# Patient Record
Sex: FEMALE | Race: WHITE | NOT HISPANIC OR LATINO | Employment: OTHER | URBAN - METROPOLITAN AREA
[De-identification: names, ages, dates, MRNs, and addresses within clinical notes are randomized per-mention and may not be internally consistent; named-entity substitution may affect disease eponyms.]

---

## 2018-10-09 ENCOUNTER — APPOINTMENT (EMERGENCY)
Dept: RADIOLOGY | Facility: HOSPITAL | Age: 83
End: 2018-10-09
Payer: MEDICARE

## 2018-10-09 ENCOUNTER — HOSPITAL ENCOUNTER (EMERGENCY)
Facility: HOSPITAL | Age: 83
Discharge: HOME | End: 2018-10-09
Attending: EMERGENCY MEDICINE | Admitting: EMERGENCY MEDICINE
Payer: MEDICARE

## 2018-10-09 VITALS
BODY MASS INDEX: 30.73 KG/M2 | SYSTOLIC BLOOD PRESSURE: 150 MMHG | HEIGHT: 64 IN | TEMPERATURE: 98.2 F | OXYGEN SATURATION: 97 % | RESPIRATION RATE: 15 BRPM | HEART RATE: 80 BPM | WEIGHT: 180 LBS | DIASTOLIC BLOOD PRESSURE: 70 MMHG

## 2018-10-09 DIAGNOSIS — R42 DIZZY: Primary | ICD-10-CM

## 2018-10-09 LAB
ALBUMIN SERPL BCP-MCNC: 3.4 G/DL (ref 3.5–5)
ALP SERPL-CCNC: 83 U/L (ref 46–116)
ALT SERPL W P-5'-P-CCNC: 39 U/L (ref 12–78)
ANION GAP SERPL CALCULATED.3IONS-SCNC: 13 MMOL/L (ref 4–13)
APTT PPP: 23 SECONDS (ref 24–33)
AST SERPL W P-5'-P-CCNC: 30 U/L (ref 5–45)
BASOPHILS # BLD AUTO: 0.04 THOUSANDS/ΜL (ref 0–0.1)
BASOPHILS NFR BLD AUTO: 1 % (ref 0–1)
BILIRUB SERPL-MCNC: 0.3 MG/DL (ref 0.2–1)
BUN SERPL-MCNC: 11 MG/DL (ref 5–25)
CALCIUM SERPL-MCNC: 9.3 MG/DL (ref 8.3–10.1)
CHLORIDE SERPL-SCNC: 99 MMOL/L (ref 100–108)
CO2 SERPL-SCNC: 21 MMOL/L (ref 21–32)
CREAT SERPL-MCNC: 0.95 MG/DL (ref 0.6–1.3)
EOSINOPHIL # BLD AUTO: 0.22 THOUSAND/ΜL (ref 0–0.61)
EOSINOPHIL NFR BLD AUTO: 3 % (ref 0–6)
ERYTHROCYTE [DISTWIDTH] IN BLOOD BY AUTOMATED COUNT: 13.3 % (ref 11.6–15.1)
GFR SERPL CREATININE-BSD FRML MDRD: 54 ML/MIN/1.73SQ M
GLUCOSE SERPL-MCNC: 162 MG/DL (ref 65–140)
GLUCOSE SERPL-MCNC: 254 MG/DL (ref 65–140)
HCT VFR BLD AUTO: 37.8 % (ref 34.8–46.1)
HGB BLD-MCNC: 12.2 G/DL (ref 11.5–15.4)
IMM GRANULOCYTES # BLD AUTO: 0.03 THOUSAND/UL (ref 0–0.2)
IMM GRANULOCYTES NFR BLD AUTO: 0 % (ref 0–2)
INR PPP: 1.01 (ref 0.86–1.16)
LYMPHOCYTES # BLD AUTO: 1.8 THOUSANDS/ΜL (ref 0.6–4.47)
LYMPHOCYTES NFR BLD AUTO: 24 % (ref 14–44)
MCH RBC QN AUTO: 30 PG (ref 26.8–34.3)
MCHC RBC AUTO-ENTMCNC: 32.3 G/DL (ref 31.4–37.4)
MCV RBC AUTO: 93 FL (ref 82–98)
MONOCYTES # BLD AUTO: 0.52 THOUSAND/ΜL (ref 0.17–1.22)
MONOCYTES NFR BLD AUTO: 7 % (ref 4–12)
NEUTROPHILS # BLD AUTO: 5.01 THOUSANDS/ΜL (ref 1.85–7.62)
NEUTS SEG NFR BLD AUTO: 65 % (ref 43–75)
NRBC BLD AUTO-RTO: 0 /100 WBCS
PLATELET # BLD AUTO: 283 THOUSANDS/UL (ref 149–390)
PMV BLD AUTO: 9.7 FL (ref 8.9–12.7)
POTASSIUM SERPL-SCNC: 4.2 MMOL/L (ref 3.5–5.3)
PROT SERPL-MCNC: 7 G/DL (ref 6.4–8.2)
PROTHROMBIN TIME: 10.6 SECONDS (ref 9.4–11.7)
RBC # BLD AUTO: 4.07 MILLION/UL (ref 3.81–5.12)
SODIUM SERPL-SCNC: 133 MMOL/L (ref 136–145)
TROPONIN I SERPL-MCNC: <0.02 NG/ML
WBC # BLD AUTO: 7.62 THOUSAND/UL (ref 4.31–10.16)

## 2018-10-09 PROCEDURE — 80053 COMPREHEN METABOLIC PANEL: CPT | Performed by: EMERGENCY MEDICINE

## 2018-10-09 PROCEDURE — 84484 ASSAY OF TROPONIN QUANT: CPT | Performed by: EMERGENCY MEDICINE

## 2018-10-09 PROCEDURE — 93005 ELECTROCARDIOGRAM TRACING: CPT

## 2018-10-09 PROCEDURE — 71045 X-RAY EXAM CHEST 1 VIEW: CPT

## 2018-10-09 PROCEDURE — 82948 REAGENT STRIP/BLOOD GLUCOSE: CPT

## 2018-10-09 PROCEDURE — 36415 COLL VENOUS BLD VENIPUNCTURE: CPT

## 2018-10-09 PROCEDURE — 85730 THROMBOPLASTIN TIME PARTIAL: CPT | Performed by: EMERGENCY MEDICINE

## 2018-10-09 PROCEDURE — 85025 COMPLETE CBC W/AUTO DIFF WBC: CPT | Performed by: EMERGENCY MEDICINE

## 2018-10-09 PROCEDURE — 99285 EMERGENCY DEPT VISIT HI MDM: CPT

## 2018-10-09 PROCEDURE — 85610 PROTHROMBIN TIME: CPT | Performed by: EMERGENCY MEDICINE

## 2018-10-09 RX ORDER — ACETAMINOPHEN 325 MG/1
650 TABLET ORAL EVERY 6 HOURS PRN
Status: DISCONTINUED | OUTPATIENT
Start: 2018-10-09 | End: 2018-10-10 | Stop reason: HOSPADM

## 2018-10-09 RX ORDER — MELATONIN 3 MG
3 TABLET ORAL
COMMUNITY

## 2018-10-09 RX ADMIN — METFORMIN HYDROCHLORIDE 500 MG: 500 TABLET, FILM COATED ORAL at 22:20

## 2018-10-09 RX ADMIN — ACETAMINOPHEN 650 MG: 325 TABLET, FILM COATED ORAL at 22:20

## 2018-10-09 NOTE — ED PROVIDER NOTES
History  Chief Complaint   Patient presents with    Dizziness     Pt states she has dizziness since this morning  Reside at [facility] and was sent here by PA to r/o cardiac event  No Chest pain as per pt  Has been having diarrhea for the past 12 hrs as per squad  facility reported that pt has been nauseous and lethargic  Pt is alert, awake and denies any chest pain  Pt states "Im hungry"  Patient states she was seen by healthcare practitioner today for dizziness  Patient states that she had an episode of dizziness today and apparently on examination had some epigastric tenderness  The patient is long-time diabetic although she has no cardiac history  She was sent in for evaluation of possible cardiac  etiology  Patient arrives awake and alert, denies any pain, nausea shortness of breath  States she is no longer dizzy either  Prior to Admission Medications   Prescriptions Last Dose Informant Patient Reported? Taking? Melatonin 3-10 MG TABS  Care Giver Yes Yes   Sig: Take 3 mg by mouth   PARoxetine (PAXIL) 30 mg tablet  Care Giver Yes Yes   Sig: Take 40 mg by mouth every morning     acetaminophen (TYLENOL) 325 mg tablet   Yes No   Sig: Take 650 mg by mouth every 4 (four) hours as needed for mild pain  amLODIPine (NORVASC) 5 mg tablet   Yes Yes   Sig: Take 5 mg by mouth daily  cycloSPORINE (RESTASIS) 0 05 % ophthalmic emulsion   Yes No   Sig: Administer 1 drop to both eyes 2 (two) times a day  etodolac (LODINE) 300 MG capsule   Yes Yes   Sig: Take 300 mg by mouth every 8 (eight) hours as needed     glipiZIDE (GLUCOTROL) 10 mg tablet  Care Giver Yes No   Sig: Take 5 mg by mouth 2 (two) times a day before meals     insulin glargine (BASAGLAR KWIKPEN) 100 units/mL injection pen  Care Giver Yes Yes   Sig: Inject 12 Units under the skin daily   loperamide (IMODIUM) 2 mg capsule   Yes No   Sig: Take 2 mg by mouth 4 (four) times a day as needed for diarrhea    losartan (COZAAR) 50 mg tablet   Yes Yes   Sig: Take 50 mg by mouth daily  loteprednol etabonate (LOTEMAX) 0 5 % ophthalmic suspension   Yes No   Sig: Administer 1 drop to both eyes 2 (two) times a day  magnesium hydroxide (MILK OF MAGNESIA) 400 mg/5 mL oral suspension   Yes No   Sig: Take 30 mL by mouth daily as needed for constipation  metFORMIN (GLUCOPHAGE) 500 mg tablet  Care Giver Yes Yes   Sig: Take 500 mg by mouth 2 (two) times a day with meals   multivitamin (THERAGRAN) TABS   Yes Yes   Sig: Take 1 tablet by mouth daily  nystatin (MYCOSTATIN) cream   Yes Yes   Sig: Apply 1 application topically 2 (two) times a day  ondansetron (ZOFRAN) 4 mg tablet   Yes No   Sig: Take 4 mg by mouth every 8 (eight) hours as needed for nausea or vomiting  pantoprazole (PROTONIX) 40 mg tablet   Yes Yes   Sig: Take 40 mg by mouth daily  simvastatin (ZOCOR) 10 mg tablet   Yes Yes   Sig: Take 10 mg by mouth daily at bedtime  sitaGLIPtin (JANUVIA) 100 mg tablet   Yes Yes   Sig: Take 100 mg by mouth daily  Facility-Administered Medications: None       Past Medical History:   Diagnosis Date    Depression     Diabetes mellitus ([facility] [address] )     Gait difficulty     Hyperlipidemia     Hypertension     Incontinence of urine     Macula lutea degeneration        Past Surgical History:   Procedure Laterality Date    APPENDECTOMY      HYSTERECTOMY         History reviewed  No pertinent family history  I have reviewed and agree with the history as documented  Social History   Substance Use Topics    Smoking status: Never Smoker    Smokeless tobacco: Never Used    Alcohol use No        Review of Systems   Constitutional: Negative for chills and fever  HENT: Negative for congestion and sore throat  Respiratory: Negative for cough and shortness of breath  Cardiovascular: Negative for chest pain  Gastrointestinal: Negative for abdominal pain  Genitourinary: Negative for dysuria     Musculoskeletal: Negative for arthralgias and back pain  Skin: Negative for rash  Neurological: Positive for dizziness and light-headedness  Negative for seizures, syncope, speech difficulty, weakness, numbness and headaches  Hematological: Does not bruise/bleed easily  Psychiatric/Behavioral: Negative for confusion  All other systems reviewed and are negative  Physical Exam  Physical Exam   Constitutional: She appears well-developed and well-nourished  HENT:   Head: Normocephalic and atraumatic  Mouth/Throat: Oropharynx is clear and moist    Eyes: Conjunctivae are normal    Neck: Normal range of motion  Neck supple  Cardiovascular: Normal rate, regular rhythm and normal heart sounds  Pulmonary/Chest: Effort normal and breath sounds normal  She exhibits no tenderness  Abdominal: Soft  Bowel sounds are normal  There is no tenderness  Musculoskeletal: Normal range of motion  She exhibits no edema or tenderness  Neurological: She is alert  Skin: Skin is warm and dry  Psychiatric: She has a normal mood and affect  Her behavior is normal    Nursing note and vitals reviewed        Vital Signs  ED Triage Vitals [10/09/18 1833]   Temperature Pulse Respirations Blood Pressure SpO2   98 2 °F (36 8 °C) 79 18 150/77 95 %      Temp src Heart Rate Source Patient Position - Orthostatic VS BP Location FiO2 (%)   -- Monitor Lying Right arm --      Pain Score       No Pain           Vitals:    10/09/18 1833   BP: 150/77   Pulse: 79   Patient Position - Orthostatic VS: Lying       Visual Acuity      ED Medications  Medications - No data to display    Diagnostic Studies  Results Reviewed     Procedure Component Value Units Date/Time    Protime-INR [[id number]]     Lab Status:  No result Specimen:  Blood     APTT [[id number]]     Lab Status:  No result Specimen:  Blood     CBC and differential [[id number]] Collected:  10/09/18 1839    Lab Status:  Final result Specimen:  Blood from Arm, Left Updated:  10/09/18 1844     WBC 7 62 Thousand/uL      RBC 4 07 Million/uL      Hemoglobin 12 2 g/dL      Hematocrit 37 8 %      MCV 93 fL      MCH 30 0 pg      MCHC 32 3 g/dL      RDW 13 3 %      MPV 9 7 fL      Platelets 410 Thousands/uL      nRBC 0 /100 WBCs      Neutrophils Relative 65 %      Immat GRANS % 0 %      Lymphocytes Relative 24 %      Monocytes Relative 7 %      Eosinophils Relative 3 %      Basophils Relative 1 %      Neutrophils Absolute 5 01 Thousands/µL      Immature Grans Absolute 0 03 Thousand/uL      Lymphocytes Absolute 1 80 Thousands/µL      Monocytes Absolute 0 52 Thousand/µL      Eosinophils Absolute 0 22 Thousand/µL      Basophils Absolute 0 04 Thousands/µL     Comprehensive metabolic panel [[id number]] Collected:  10/09/18 1839    Lab Status: In process Specimen:  Blood from Arm, Left Updated:  10/09/18 1842    Troponin I [[id number]] Collected:  10/09/18 1839    Lab Status: In process Specimen:  Blood from Arm, Left Updated:  10/09/18 1842                 XR chest 1 view portable    (Results Pending)              Procedures  ECG 12 Lead Documentation  Date/Time: 10/9/2018 6:40 PM  Performed by: Fatou Schaefer  Authorized by: Fatou Schaefer     Indications / Diagnosis:  Dizziness  ECG reviewed by me, the ED Provider: yes    Patient location:  ED  Interpretation:     Interpretation: normal    Rate:     ECG rate:  80    ECG rate assessment: normal    Rhythm:     Rhythm: sinus rhythm    Ectopy:     Ectopy: none    QRS:     QRS axis:  Normal    QRS intervals:  Normal  Conduction:     Conduction: normal    ST segments:     ST segments:  Normal  T waves:     T waves: normal             Phone Contacts  ED Phone Contact    ED Course                               [facility]  [facility] Time    Disposition  Final diagnoses:   None     ED Disposition     None      Follow-up Information    None         Patient's Medications   Discharge Prescriptions    No medications on file     No discharge procedures on file      ED Provider  Electronically Signed by           Cydney Chavis Mitch Joseph MD  10/09/18 6496

## 2018-10-10 LAB
ATRIAL RATE: 80 BPM
P AXIS: 0 DEGREES
PR INTERVAL: 150 MS
QRS AXIS: 26 DEGREES
QRSD INTERVAL: 82 MS
QT INTERVAL: 370 MS
QTC INTERVAL: 426 MS
T WAVE AXIS: 66 DEGREES
VENTRICULAR RATE: 80 BPM

## 2018-10-10 PROCEDURE — 93010 ELECTROCARDIOGRAM REPORT: CPT | Performed by: INTERNAL MEDICINE

## 2018-10-10 NOTE — ED NOTES
Pt c o headache and requested tylenol  Dr David Dash made aware        Leona Leon, RN  10/09/18 7751

## 2018-10-10 NOTE — DISCHARGE INSTRUCTIONS
Dizziness, Ambulatory Care   GENERAL INFORMATION:   Dizziness  is a term used to describe a feeling of being off balance or unsteady  Common causes of dizziness are an inner ear fluid imbalance or a lack of oxygen in your blood  Your dizziness may be acute (lasts 3 days or less) or chronic (lasts longer than 3 days)  You may have dizzy spells that last from seconds to a few hours  Common symptoms related to dizziness include the following:   · A feeling that your surroundings are moving even though you are standing still    · Ringing in your ears or hearing loss     · Feeling faint or lightheaded     · Weakness or unsteadiness     · Double vision or eye movements you cannot control    · Nausea or vomiting     · Confusion  Seek immediate care for the following symptoms:   · You have a headache that does not go away with medicine  · You have shaking chills and a fever  · You vomit over and over with no relief  · Your vomit or bowel movements are red or black  · You have pain in your chest, back, or abdomen  · You have numbness, especially in your face, arms, or legs  · You have trouble moving your arms or legs  Treatment for dizziness  depends on the cause of your dizziness  You may need medicines to decrease your dizziness or nausea  You may need to be admitted to the hospital for treatment  Manage your symptoms:  Get up slowly from sitting or lying down  Do not drive or operate machinery when you are dizzy  Follow up with your healthcare provider as directed:  Write down your questions so you remember to ask them during your visits  CARE AGREEMENT:   You have the right to help plan your care  Learn about your health condition and how it may be treated  Discuss treatment options with your caregivers to decide what care you want to receive  You always have the right to refuse treatment  The above information is an  only   It is not intended as medical advice for individual conditions or treatments  Talk to your doctor, nurse or pharmacist before following any medical regimen to see if it is safe and effective for you  © 2014 [address] is for End User's use only and may not be sold, redistributed or otherwise used for commercial purposes  All illustrations and images included in CareNotes® are the copyrighted property of A D A M , Inc  or Fidencio Azevedo

## 2018-10-10 NOTE — ED NOTES
Katie at [facility] is notified that pt is being d/c back to the facility  Transport schedule at midnight  Spoke to IVONNE (nurse)         Ana Turner RN  10/09/18 3022

## 2018-10-10 NOTE — ED NOTES
Patient is resting comfortably  Aware transfer to [facility] pending midnight       Patricia Arnold RN  10/09/18 0012

## 2019-02-25 ENCOUNTER — TRANSCRIBE ORDERS (OUTPATIENT)
Dept: ADMINISTRATIVE | Facility: HOSPITAL | Age: 84
End: 2019-02-25

## 2019-02-25 DIAGNOSIS — M25.512 ACUTE PAIN OF LEFT SHOULDER: Primary | ICD-10-CM

## 2019-02-28 ENCOUNTER — HOSPITAL ENCOUNTER (OUTPATIENT)
Dept: RADIOLOGY | Facility: HOSPITAL | Age: 84
Discharge: HOME | End: 2019-02-28
Payer: MEDICARE

## 2019-02-28 DIAGNOSIS — M25.512 ACUTE PAIN OF LEFT SHOULDER: ICD-10-CM

## 2019-02-28 PROCEDURE — 73200 CT UPPER EXTREMITY W/O DYE: CPT

## 2019-03-13 ENCOUNTER — OFFICE VISIT (OUTPATIENT)
Dept: OBGYN CLINIC | Facility: CLINIC | Age: 84
End: 2019-03-13
Payer: MEDICARE

## 2019-03-13 VITALS
DIASTOLIC BLOOD PRESSURE: 71 MMHG | BODY MASS INDEX: 27.84 KG/M2 | WEIGHT: 163.1 LBS | SYSTOLIC BLOOD PRESSURE: 108 MMHG | HEIGHT: 64 IN | HEART RATE: 92 BPM

## 2019-03-13 DIAGNOSIS — S42.125A CLOSED NONDISPLACED FRACTURE OF LEFT ACROMIAL PROCESS, INITIAL ENCOUNTER: Primary | ICD-10-CM

## 2019-03-13 PROCEDURE — 99204 OFFICE O/P NEW MOD 45 MIN: CPT | Performed by: ORTHOPAEDIC SURGERY

## 2019-03-13 RX ORDER — ACETAMINOPHEN 500 MG
1000 TABLET ORAL EVERY 6 HOURS PRN
COMMUNITY

## 2019-03-13 NOTE — PROGRESS NOTES
Assessment/Plan:  1  Closed nondisplaced fracture of left acromial process, initial encounter  Ambulatory referral to Physical Therapy       Scribe Attestation    I,:   Jose Miguel Hebert am acting as a scribe while in the presence of the attending physician :        I,:   Yolette Richey MD personally performed the services described in this documentation    as scribed in my presence :              Patito Claire upon examination review of the CT scan of her left shoulder does demonstrate an acute acromion process fracture, as well as significant osteoarthritis into the glenohumeral joint  I did review the images with her and her family today, and given her age and activity level I do not recommend a shoulder replacement procedure today  Additionally the fracture to the acromion will preclude her from a reverse total shoulder anyway  I would like to treat her conservatively as I noted the fracture will heal on its own and many of the abnormalities in her shoulder are due to chronic injuries  At this point she may discontinue the use of the sling I would like her to begin physical therapy with a focus on gentle active range of motion to regain shoulder movement  I provided her with a prescription for this today  I would like to see her back in 6 weeks for repeat clinical evaluation at that time we will get a repeat x-ray to ensure there is no movement to the acromion process  Subjective:   Tanna Wells is a 80 y o  female who presents for initial evaluation of her left shoulder  She suffered a fall off of her toilet 4 weeks ago that resulted in severe left shoulder pain  She has been in a sling over the last 4 weeks  She has not been participating in physical therapy as they were unsure of the course of care given her fractures  She states that she is experiencing intermittent and mild to moderate sharp pain about the superior and superior lateral aspect of her shoulder    She states she does have a history of significant injury to the left upper extremity with a fracture into the elbow, as well as a proximal humerus fracture many years ago  She does have a tremor she has had for many years and notes that is most exacerbated with fine motor activities such as writing or pouring fluids into a cup  Today she denies any distal paresthesias  Review of Systems   Constitutional: Negative for chills, fever and unexpected weight change  HENT: Negative for hearing loss, nosebleeds and sore throat  Eyes: Negative for pain, redness and visual disturbance  Respiratory: Negative for cough, shortness of breath and wheezing  Cardiovascular: Negative for chest pain, palpitations and leg swelling  Gastrointestinal: Negative for abdominal pain, nausea and vomiting  Endocrine: Negative for polydipsia and polyuria  Genitourinary: Negative for dysuria and hematuria  Musculoskeletal: Positive for arthralgias and myalgias  See HPI   Skin: Negative for rash and wound  Neurological: Negative for dizziness, numbness and headaches  Psychiatric/Behavioral: Negative for decreased concentration and suicidal ideas  The patient is not nervous/anxious            Past Medical History:   Diagnosis Date    Depression     Diabetes mellitus ([address] )     Gait difficulty     Hyperlipidemia     Hypertension     Incontinence of urine     Macula lutea degeneration        Past Surgical History:   Procedure Laterality Date    APPENDECTOMY      HYSTERECTOMY         Family History   Problem Relation Age of Onset    No Known Problems Mother     No Known Problems Father     No Known Problems Sister     No Known Problems Brother     No Known Problems Maternal Aunt     No Known Problems Maternal Uncle     No Known Problems Paternal Aunt     No Known Problems Paternal Uncle     No Known Problems Maternal Grandmother     No Known Problems Maternal Grandfather     No Known Problems Paternal Grandmother     No Known Problems Paternal Grandfather     ADD / ADHD Neg Hx     Anesthesia problems Neg Hx     Cancer Neg Hx     Clotting disorder Neg Hx     Collagen disease Neg Hx     Diabetes Neg Hx     Dislocations Neg Hx     Learning disabilities Neg Hx     Neurological problems Neg Hx     Osteoporosis Neg Hx     Rheumatologic disease Neg Hx     Scoliosis Neg Hx     Vascular Disease Neg Hx        Social History     Occupational History    Not on file   Tobacco Use    Smoking status: Never Smoker    Smokeless tobacco: Never Used   Substance and Sexual Activity    Alcohol use: No    Drug use: No    Sexual activity: Not on file         Current Outpatient Medications:     acetaminophen (TYLENOL) 325 mg tablet, Take 650 mg by mouth every 4 (four) hours as needed for mild pain , Disp: , Rfl:     acetaminophen (TYLENOL) 500 mg tablet, Take 500 mg by mouth every 6 (six) hours as needed for mild pain, Disp: , Rfl:     amLODIPine (NORVASC) 5 mg tablet, Take 5 mg by mouth daily  , Disp: , Rfl:     cycloSPORINE (RESTASIS) 0 05 % ophthalmic emulsion, Administer 1 drop to both eyes 2 (two) times a day , Disp: , Rfl:     glipiZIDE (GLUCOTROL) 10 mg tablet, Take 5 mg by mouth 2 (two) times a day before meals  , Disp: , Rfl:     insulin glargine (BASAGLAR KWIKPEN) 100 units/mL injection pen, Inject 12 Units under the skin daily, Disp: , Rfl:     losartan (COZAAR) 50 mg tablet, Take 50 mg by mouth daily  , Disp: , Rfl:     Melatonin 3-10 MG TABS, Take 3 mg by mouth, Disp: , Rfl:     Menthol-Zinc Oxide (REMEDY CALAZIME EX), Apply topically, Disp: , Rfl:     metFORMIN (GLUCOPHAGE) 500 mg tablet, Take 500 mg by mouth 2 (two) times a day with meals, Disp: , Rfl:     multivitamin (THERAGRAN) TABS, Take 1 tablet by mouth daily  , Disp: , Rfl:     pantoprazole (PROTONIX) 40 mg tablet, Take 40 mg by mouth daily  , Disp: , Rfl:     PARoxetine (PAXIL) 30 mg tablet, Take 40 mg by mouth every morning  , Disp: , Rfl:     simvastatin (ZOCOR) 10 mg tablet, Take 10 mg by mouth daily at bedtime  , Disp: , Rfl:     sitaGLIPtin (JANUVIA) 100 mg tablet, Take 100 mg by mouth daily  , Disp: , Rfl:     etodolac (LODINE) 300 MG capsule, Take 300 mg by mouth every 8 (eight) hours as needed  , Disp: , Rfl:     loperamide (IMODIUM) 2 mg capsule, Take 2 mg by mouth 4 (four) times a day as needed for diarrhea , Disp: , Rfl:     loteprednol etabonate (LOTEMAX) 0 5 % ophthalmic suspension, Administer 1 drop to both eyes 2 (two) times a day , Disp: , Rfl:     magnesium hydroxide (MILK OF MAGNESIA) 400 mg/5 mL oral suspension, Take 30 mL by mouth daily as needed for constipation  , Disp: , Rfl:     nystatin (MYCOSTATIN) cream, Apply 1 application topically 2 (two) times a day , Disp: , Rfl:     ondansetron (ZOFRAN) 4 mg tablet, Take 4 mg by mouth every 8 (eight) hours as needed for nausea or vomiting , Disp: , Rfl:     No Known Allergies    Objective:  Vitals:    03/13/19 1032   BP: 108/71   Pulse: 92       Left Shoulder Exam     Tenderness   The patient is experiencing tenderness in the acromion  Range of Motion   Active abduction: 70   External rotation: 10   Internal rotation 90 degrees: 10     Muscle Strength   Abduction: 3/5   External rotation: 2/5     Other   Erythema: absent  Scars: absent  Sensation: normal  Pulse: present             Physical Exam   Constitutional: She is oriented to person, place, and time  She appears well-developed and well-nourished  HENT:   Head: Normocephalic and atraumatic  Eyes: Conjunctivae are normal  Right eye exhibits no discharge  Left eye exhibits no discharge  Neck: Normal range of motion  Neck supple  Cardiovascular: Normal rate and intact distal pulses  Pulmonary/Chest: Effort normal  No respiratory distress  Musculoskeletal:   As noted in HPI   Neurological: She is alert and oriented to person, place, and time  Skin: Skin is warm and dry  Psychiatric: She has a normal mood and affect   Her behavior is normal  Judgment and thought content normal    Nursing note and vitals reviewed  I have personally reviewed pertinent films in PACS and my interpretation is as follows:  CT scan of the left shoulder demonstrates an acute acromion process fracture  There is also severe glenohumeral joint osteoarthritis with anterior subluxation  Large chronic Hill-Sachs deformity is also demonstrated  Avascular necrosis demonstrated at the humeral head  There is also severe atrophy of the supraspinatus and infraspinatus  There is also a healed fracture line at the proximal humerus

## 2019-03-31 ENCOUNTER — HOSPITAL ENCOUNTER (EMERGENCY)
Facility: HOSPITAL | Age: 84
Discharge: HOME | End: 2019-03-31
Attending: EMERGENCY MEDICINE | Admitting: EMERGENCY MEDICINE
Payer: MEDICARE

## 2019-03-31 ENCOUNTER — APPOINTMENT (EMERGENCY)
Dept: RADIOLOGY | Facility: HOSPITAL | Age: 84
End: 2019-03-31
Payer: MEDICARE

## 2019-03-31 VITALS
WEIGHT: 163.14 LBS | HEIGHT: 64 IN | BODY MASS INDEX: 27.85 KG/M2 | OXYGEN SATURATION: 99 % | HEART RATE: 94 BPM | TEMPERATURE: 99.6 F | SYSTOLIC BLOOD PRESSURE: 149 MMHG | DIASTOLIC BLOOD PRESSURE: 67 MMHG | RESPIRATION RATE: 20 BRPM
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
## 2019-03-31 DIAGNOSIS — M25.519 SHOULDER PAIN: ICD-10-CM

## 2019-03-31 DIAGNOSIS — M25.559 HIP PAIN: ICD-10-CM

## 2019-03-31 PROCEDURE — 99284 EMERGENCY DEPT VISIT MOD MDM: CPT

## 2019-03-31 PROCEDURE — 73502 X-RAY EXAM HIP UNI 2-3 VIEWS: CPT

## 2019-03-31 PROCEDURE — 73030 X-RAY EXAM OF SHOULDER: CPT

## 2019-03-31 PROCEDURE — 70450 CT HEAD/BRAIN W/O DYE: CPT

## 2019-03-31 PROCEDURE — 72125 CT NECK SPINE W/O DYE: CPT

## 2019-03-31 RX ORDER — ACETAMINOPHEN 325 MG/1
650 TABLET ORAL ONCE
Status: COMPLETED | OUTPATIENT
Start: 2019-03-31 | End: 2019-03-31

## 2019-03-31 RX ADMIN — ACETAMINOPHEN 650 MG: 325 TABLET, FILM COATED ORAL at 15:06

## 2019-04-22 ENCOUNTER — APPOINTMENT (EMERGENCY)
Dept: RADIOLOGY | Facility: HOSPITAL | Age: 84
End: 2019-04-22
Payer: MEDICARE

## 2019-04-22 ENCOUNTER — HOSPITAL ENCOUNTER (EMERGENCY)
Facility: HOSPITAL | Age: 84
Discharge: HOME | End: 2019-04-22
Attending: EMERGENCY MEDICINE | Admitting: EMERGENCY MEDICINE
Payer: MEDICARE

## 2019-04-22 VITALS
HEART RATE: 76 BPM | OXYGEN SATURATION: 96 % | RESPIRATION RATE: 18 BRPM | WEIGHT: 163.14 LBS | BODY MASS INDEX: 28 KG/M2 | SYSTOLIC BLOOD PRESSURE: 187 MMHG | TEMPERATURE: 97.1 F | DIASTOLIC BLOOD PRESSURE: 80 MMHG

## 2019-04-22 DIAGNOSIS — S02.2XXA NASAL BONE FRACTURES: ICD-10-CM

## 2019-04-22 DIAGNOSIS — S22.39XA CLOSED RIB FRACTURE: ICD-10-CM
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
## 2019-04-22 PROCEDURE — 99284 EMERGENCY DEPT VISIT MOD MDM: CPT

## 2019-04-22 PROCEDURE — 72125 CT NECK SPINE W/O DYE: CPT

## 2019-04-22 PROCEDURE — 70450 CT HEAD/BRAIN W/O DYE: CPT

## 2019-04-22 PROCEDURE — 73030 X-RAY EXAM OF SHOULDER: CPT

## 2019-04-22 PROCEDURE — 71250 CT THORAX DX C-: CPT

## 2019-04-22 PROCEDURE — 70486 CT MAXILLOFACIAL W/O DYE: CPT

## 2019-04-22 PROCEDURE — 73060 X-RAY EXAM OF HUMERUS: CPT

## 2019-04-22 PROCEDURE — 73080 X-RAY EXAM OF ELBOW: CPT

## 2019-04-22 RX ORDER — TRAMADOL HYDROCHLORIDE 50 MG/1
50 TABLET ORAL EVERY 6 HOURS PRN
Qty: 30 TABLET | Refills: 0 | Status: SHIPPED | OUTPATIENT
Start: 2019-04-22 | End: 2019-05-02

## 2019-04-22 RX ORDER — TRAMADOL HYDROCHLORIDE 50 MG/1
50 TABLET ORAL ONCE
Status: COMPLETED | OUTPATIENT
Start: 2019-04-22 | End: 2019-04-22

## 2019-04-22 RX ORDER — ACETAMINOPHEN AND CODEINE PHOSPHATE 300; 30 MG/1; MG/1
1 TABLET ORAL EVERY 6 HOURS PRN
COMMUNITY

## 2019-04-22 RX ADMIN — TRAMADOL HYDROCHLORIDE 50 MG: 50 TABLET, COATED ORAL at 07:28

## 2019-04-22 RX ADMIN — TRAMADOL HYDROCHLORIDE 50 MG: 50 TABLET, COATED ORAL at 11:34

## 2019-05-21 ENCOUNTER — APPOINTMENT (EMERGENCY)
Dept: RADIOLOGY | Facility: HOSPITAL | Age: 84
DRG: 092 | End: 2019-05-21
Payer: MEDICARE

## 2019-05-21 ENCOUNTER — HOSPITAL ENCOUNTER (INPATIENT)
Facility: HOSPITAL | Age: 84
LOS: 1 days | Discharge: HOME HEALTH | DRG: 092 | End: 2019-05-23
Attending: EMERGENCY MEDICINE | Admitting: FAMILY MEDICINE
Payer: MEDICARE
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
## 2019-05-21 DIAGNOSIS — R55 SYNCOPE: Primary | ICD-10-CM

## 2019-05-21 DIAGNOSIS — IMO0001 INSULIN DEPENDENT DIABETES MELLITUS: ICD-10-CM

## 2019-05-21 PROBLEM — E78.2 MIXED HYPERLIPIDEMIA: Status: ACTIVE | Noted: 2019-05-21

## 2019-05-21 PROBLEM — F32.A DEPRESSION: Status: ACTIVE | Noted: 2019-05-21

## 2019-05-21 PROBLEM — I10 BENIGN ESSENTIAL HYPERTENSION: Status: ACTIVE | Noted: 2019-05-21

## 2019-05-21 LAB
ALBUMIN SERPL BCP-MCNC: 3.2 G/DL (ref 3.5–5)
ALP SERPL-CCNC: 82 U/L (ref 46–116)
ALT SERPL W P-5'-P-CCNC: 26 U/L (ref 12–78)
ANION GAP SERPL CALCULATED.3IONS-SCNC: 7 MMOL/L (ref 4–13)
APTT PPP: 24 SECONDS (ref 24–33)
AST SERPL W P-5'-P-CCNC: 24 U/L (ref 5–45)
BASOPHILS # BLD AUTO: 0.03 THOUSANDS/ΜL (ref 0–0.1)
BASOPHILS NFR BLD AUTO: 0 % (ref 0–1)
BILIRUB SERPL-MCNC: 0.3 MG/DL (ref 0.2–1)
BILIRUB UR QL STRIP: NEGATIVE
BUN SERPL-MCNC: 9 MG/DL (ref 5–25)
CALCIUM SERPL-MCNC: 9.5 MG/DL (ref 8.3–10.1)
CHLORIDE SERPL-SCNC: 100 MMOL/L (ref 100–108)
CHOLEST SERPL-MCNC: 159 MG/DL (ref 50–200)
CLARITY UR: CLEAR
CO2 SERPL-SCNC: 28 MMOL/L (ref 21–32)
COLOR UR: YELLOW
CREAT SERPL-MCNC: 0.93 MG/DL (ref 0.6–1.3)
EOSINOPHIL # BLD AUTO: 0.25 THOUSAND/ΜL (ref 0–0.61)
EOSINOPHIL NFR BLD AUTO: 4 % (ref 0–6)
ERYTHROCYTE [DISTWIDTH] IN BLOOD BY AUTOMATED COUNT: 13.7 % (ref 11.6–15.1)
GFR SERPL CREATININE-BSD FRML MDRD: 55 ML/MIN/1.73SQ M
GLUCOSE SERPL-MCNC: 122 MG/DL (ref 65–140)
GLUCOSE SERPL-MCNC: 79 MG/DL (ref 65–140)
GLUCOSE UR STRIP-MCNC: NEGATIVE MG/DL
HCT VFR BLD AUTO: 36.3 % (ref 34.8–46.1)
HDLC SERPL-MCNC: 63 MG/DL (ref 40–60)
HGB BLD-MCNC: 11.4 G/DL (ref 11.5–15.4)
HGB UR QL STRIP.AUTO: NEGATIVE
IMM GRANULOCYTES # BLD AUTO: 0.04 THOUSAND/UL (ref 0–0.2)
IMM GRANULOCYTES NFR BLD AUTO: 1 % (ref 0–2)
INR PPP: 1.02 (ref 0.86–1.16)
KETONES UR STRIP-MCNC: NEGATIVE MG/DL
LDLC SERPL CALC-MCNC: 80 MG/DL (ref 0–100)
LEUKOCYTE ESTERASE UR QL STRIP: NEGATIVE
LYMPHOCYTES # BLD AUTO: 1.61 THOUSANDS/ΜL (ref 0.6–4.47)
LYMPHOCYTES NFR BLD AUTO: 23 % (ref 14–44)
MCH RBC QN AUTO: 29.1 PG (ref 26.8–34.3)
MCHC RBC AUTO-ENTMCNC: 31.4 G/DL (ref 31.4–37.4)
MCV RBC AUTO: 93 FL (ref 82–98)
MONOCYTES # BLD AUTO: 0.71 THOUSAND/ΜL (ref 0.17–1.22)
MONOCYTES NFR BLD AUTO: 10 % (ref 4–12)
NEUTROPHILS # BLD AUTO: 4.51 THOUSANDS/ΜL (ref 1.85–7.62)
NEUTS SEG NFR BLD AUTO: 62 % (ref 43–75)
NITRITE UR QL STRIP: NEGATIVE
NONHDLC SERPL-MCNC: 96 MG/DL
NRBC BLD AUTO-RTO: 0 /100 WBCS
NT-PROBNP SERPL-MCNC: 76 PG/ML
PH UR STRIP.AUTO: 6 [PH]
PLATELET # BLD AUTO: 280 THOUSANDS/UL (ref 149–390)
PMV BLD AUTO: 9.4 FL (ref 8.9–12.7)
POTASSIUM SERPL-SCNC: 4.6 MMOL/L (ref 3.5–5.3)
PROT SERPL-MCNC: 6.9 G/DL (ref 6.4–8.2)
PROT UR STRIP-MCNC: NEGATIVE MG/DL
PROTHROMBIN TIME: 10.7 SECONDS (ref 9.4–11.7)
RBC # BLD AUTO: 3.92 MILLION/UL (ref 3.81–5.12)
SODIUM SERPL-SCNC: 135 MMOL/L (ref 136–145)
SP GR UR STRIP.AUTO: 1.01 (ref 1–1.03)
TRIGL SERPL-MCNC: 78 MG/DL
TROPONIN I SERPL-MCNC: 0.03 NG/ML
TROPONIN I SERPL-MCNC: <0.02 NG/ML
UROBILINOGEN UR QL STRIP.AUTO: 0.2 E.U./DL
WBC # BLD AUTO: 7.15 THOUSAND/UL (ref 4.31–10.16)

## 2019-05-21 PROCEDURE — 80053 COMPREHEN METABOLIC PANEL: CPT | Performed by: EMERGENCY MEDICINE

## 2019-05-21 PROCEDURE — 85025 COMPLETE CBC W/AUTO DIFF WBC: CPT | Performed by: EMERGENCY MEDICINE

## 2019-05-21 PROCEDURE — 81003 URINALYSIS AUTO W/O SCOPE: CPT | Performed by: EMERGENCY MEDICINE

## 2019-05-21 PROCEDURE — 99219 PR INITIAL OBSERVATION CARE/DAY 50 MINUTES: CPT | Performed by: NURSE PRACTITIONER

## 2019-05-21 PROCEDURE — 99285 EMERGENCY DEPT VISIT HI MDM: CPT

## 2019-05-21 PROCEDURE — 72125 CT NECK SPINE W/O DYE: CPT

## 2019-05-21 PROCEDURE — 87081 CULTURE SCREEN ONLY: CPT | Performed by: NURSE PRACTITIONER

## 2019-05-21 PROCEDURE — 85610 PROTHROMBIN TIME: CPT | Performed by: EMERGENCY MEDICINE

## 2019-05-21 PROCEDURE — 73030 X-RAY EXAM OF SHOULDER: CPT

## 2019-05-21 PROCEDURE — 70450 CT HEAD/BRAIN W/O DYE: CPT

## 2019-05-21 PROCEDURE — 36415 COLL VENOUS BLD VENIPUNCTURE: CPT | Performed by: EMERGENCY MEDICINE

## 2019-05-21 PROCEDURE — 1123F ACP DISCUSS/DSCN MKR DOCD: CPT | Performed by: INTERNAL MEDICINE

## 2019-05-21 PROCEDURE — 84484 ASSAY OF TROPONIN QUANT: CPT | Performed by: EMERGENCY MEDICINE

## 2019-05-21 PROCEDURE — 87086 URINE CULTURE/COLONY COUNT: CPT | Performed by: EMERGENCY MEDICINE

## 2019-05-21 PROCEDURE — 80061 LIPID PANEL: CPT | Performed by: NURSE PRACTITIONER

## 2019-05-21 PROCEDURE — 71045 X-RAY EXAM CHEST 1 VIEW: CPT

## 2019-05-21 PROCEDURE — 84484 ASSAY OF TROPONIN QUANT: CPT | Performed by: NURSE PRACTITIONER

## 2019-05-21 PROCEDURE — 73564 X-RAY EXAM KNEE 4 OR MORE: CPT

## 2019-05-21 PROCEDURE — 82948 REAGENT STRIP/BLOOD GLUCOSE: CPT

## 2019-05-21 PROCEDURE — 93005 ELECTROCARDIOGRAM TRACING: CPT

## 2019-05-21 PROCEDURE — 83880 ASSAY OF NATRIURETIC PEPTIDE: CPT | Performed by: EMERGENCY MEDICINE

## 2019-05-21 PROCEDURE — 85730 THROMBOPLASTIN TIME PARTIAL: CPT | Performed by: EMERGENCY MEDICINE

## 2019-05-21 RX ORDER — NYSTATIN 100000 U/G
1 CREAM TOPICAL 2 TIMES DAILY
COMMUNITY

## 2019-05-21 RX ORDER — ASPIRIN 81 MG/1
81 TABLET, CHEWABLE ORAL DAILY
Status: DISCONTINUED | OUTPATIENT
Start: 2019-05-22 | End: 2019-05-23 | Stop reason: HOSPADM

## 2019-05-21 RX ORDER — LANOLIN ALCOHOL/MO/W.PET/CERES
3 CREAM (GRAM) TOPICAL
Status: DISCONTINUED | OUTPATIENT
Start: 2019-05-21 | End: 2019-05-23 | Stop reason: HOSPADM

## 2019-05-21 RX ORDER — PAROXETINE HYDROCHLORIDE 20 MG/1
40 TABLET, FILM COATED ORAL EVERY MORNING
Status: DISCONTINUED | OUTPATIENT
Start: 2019-05-22 | End: 2019-05-23 | Stop reason: HOSPADM

## 2019-05-21 RX ORDER — PANTOPRAZOLE SODIUM 40 MG/1
40 TABLET, DELAYED RELEASE ORAL DAILY
Status: DISCONTINUED | OUTPATIENT
Start: 2019-05-22 | End: 2019-05-23 | Stop reason: HOSPADM

## 2019-05-21 RX ORDER — LOSARTAN POTASSIUM 50 MG/1
50 TABLET ORAL DAILY
Status: DISCONTINUED | OUTPATIENT
Start: 2019-05-22 | End: 2019-05-22

## 2019-05-21 RX ORDER — AMLODIPINE BESYLATE 5 MG/1
5 TABLET ORAL DAILY
Status: DISCONTINUED | OUTPATIENT
Start: 2019-05-22 | End: 2019-05-22

## 2019-05-21 RX ORDER — INSULIN GLARGINE 100 [IU]/ML
24 INJECTION, SOLUTION SUBCUTANEOUS EVERY MORNING
Status: DISCONTINUED | OUTPATIENT
Start: 2019-05-22 | End: 2019-05-23 | Stop reason: HOSPADM

## 2019-05-21 RX ORDER — ACETAMINOPHEN 325 MG/1
650 TABLET ORAL EVERY 6 HOURS PRN
Status: DISCONTINUED | OUTPATIENT
Start: 2019-05-21 | End: 2019-05-23 | Stop reason: HOSPADM

## 2019-05-21 RX ORDER — ACETAMINOPHEN AND CODEINE PHOSPHATE 300; 30 MG/1; MG/1
1 TABLET ORAL EVERY 6 HOURS PRN
Status: DISCONTINUED | OUTPATIENT
Start: 2019-05-21 | End: 2019-05-21

## 2019-05-21 RX ORDER — PRAVASTATIN SODIUM 20 MG
20 TABLET ORAL
Status: DISCONTINUED | OUTPATIENT
Start: 2019-05-22 | End: 2019-05-23 | Stop reason: HOSPADM

## 2019-05-21 RX ORDER — ACETAMINOPHEN 500 MG
1000 TABLET ORAL EVERY 6 HOURS PRN
Status: DISCONTINUED | OUTPATIENT
Start: 2019-05-21 | End: 2019-05-21 | Stop reason: SDUPTHER

## 2019-05-21 RX ORDER — GLIPIZIDE 5 MG/1
5 TABLET ORAL DAILY
Status: DISCONTINUED | OUTPATIENT
Start: 2019-05-22 | End: 2019-05-23 | Stop reason: HOSPADM

## 2019-05-21 RX ORDER — POLYVINYL ALCOHOL 14 MG/ML
1 SOLUTION/ DROPS OPHTHALMIC EVERY 12 HOURS SCHEDULED
Status: DISCONTINUED | OUTPATIENT
Start: 2019-05-21 | End: 2019-05-23 | Stop reason: HOSPADM

## 2019-05-21 RX ADMIN — MELATONIN 3 MG: at 22:35

## 2019-05-21 RX ADMIN — MORPHINE SULFATE 2 MG: 2 INJECTION, SOLUTION INTRAMUSCULAR; INTRAVENOUS at 18:58

## 2019-05-21 RX ADMIN — POLYVINYL ALCOHOL 1 DROP: 14 SOLUTION/ DROPS OPHTHALMIC at 22:35

## 2019-05-22 ENCOUNTER — APPOINTMENT (OUTPATIENT)
Dept: NON INVASIVE DIAGNOSTICS | Facility: HOSPITAL | Age: 84
DRG: 092 | End: 2019-05-22
Payer: MEDICARE

## 2019-05-22 ENCOUNTER — APPOINTMENT (OUTPATIENT)
Dept: RADIOLOGY | Facility: HOSPITAL | Age: 84
DRG: 092 | End: 2019-05-22
Payer: MEDICARE

## 2019-05-22 PROBLEM — Z91.81 STATUS POST FALL: Status: ACTIVE | Noted: 2019-05-22

## 2019-05-22 PROBLEM — E87.1 HYPONATREMIA: Status: ACTIVE | Noted: 2019-05-22

## 2019-05-22 LAB
ANION GAP SERPL CALCULATED.3IONS-SCNC: 8 MMOL/L (ref 4–13)
BACTERIA UR CULT: NORMAL
BUN SERPL-MCNC: 8 MG/DL (ref 5–25)
CALCIUM SERPL-MCNC: 9 MG/DL (ref 8.3–10.1)
CHLORIDE SERPL-SCNC: 100 MMOL/L (ref 100–108)
CO2 SERPL-SCNC: 27 MMOL/L (ref 21–32)
CREAT SERPL-MCNC: 0.89 MG/DL (ref 0.6–1.3)
ERYTHROCYTE [DISTWIDTH] IN BLOOD BY AUTOMATED COUNT: 13.5 % (ref 11.6–15.1)
EST. AVERAGE GLUCOSE BLD GHB EST-MCNC: 143 MG/DL
GFR SERPL CREATININE-BSD FRML MDRD: 58 ML/MIN/1.73SQ M
GLUCOSE P FAST SERPL-MCNC: 120 MG/DL (ref 65–99)
GLUCOSE SERPL-MCNC: 120 MG/DL (ref 65–140)
GLUCOSE SERPL-MCNC: 146 MG/DL (ref 65–140)
GLUCOSE SERPL-MCNC: 153 MG/DL (ref 65–140)
GLUCOSE SERPL-MCNC: 164 MG/DL (ref 65–140)
GLUCOSE SERPL-MCNC: 89 MG/DL (ref 65–140)
HBA1C MFR BLD: 6.6 % (ref 4.2–6.3)
HCT VFR BLD AUTO: 36.5 % (ref 34.8–46.1)
HGB BLD-MCNC: 11.6 G/DL (ref 11.5–15.4)
MCH RBC QN AUTO: 29.3 PG (ref 26.8–34.3)
MCHC RBC AUTO-ENTMCNC: 31.8 G/DL (ref 31.4–37.4)
MCV RBC AUTO: 92 FL (ref 82–98)
PLATELET # BLD AUTO: 260 THOUSANDS/UL (ref 149–390)
PMV BLD AUTO: 9.7 FL (ref 8.9–12.7)
POTASSIUM SERPL-SCNC: 4.1 MMOL/L (ref 3.5–5.3)
RBC # BLD AUTO: 3.96 MILLION/UL (ref 3.81–5.12)
SODIUM SERPL-SCNC: 135 MMOL/L (ref 136–145)
TROPONIN I SERPL-MCNC: <0.02 NG/ML
WBC # BLD AUTO: 6.98 THOUSAND/UL (ref 4.31–10.16)

## 2019-05-22 PROCEDURE — G8987 SELF CARE CURRENT STATUS: HCPCS

## 2019-05-22 PROCEDURE — 82948 REAGENT STRIP/BLOOD GLUCOSE: CPT

## 2019-05-22 PROCEDURE — 97535 SELF CARE MNGMENT TRAINING: CPT

## 2019-05-22 PROCEDURE — 97167 OT EVAL HIGH COMPLEX 60 MIN: CPT

## 2019-05-22 PROCEDURE — 97110 THERAPEUTIC EXERCISES: CPT

## 2019-05-22 PROCEDURE — 85027 COMPLETE CBC AUTOMATED: CPT | Performed by: NURSE PRACTITIONER

## 2019-05-22 PROCEDURE — 93306 TTE W/DOPPLER COMPLETE: CPT

## 2019-05-22 PROCEDURE — 99232 SBSQ HOSP IP/OBS MODERATE 35: CPT | Performed by: FAMILY MEDICINE

## 2019-05-22 PROCEDURE — 97162 PT EVAL MOD COMPLEX 30 MIN: CPT

## 2019-05-22 PROCEDURE — 84484 ASSAY OF TROPONIN QUANT: CPT | Performed by: NURSE PRACTITIONER

## 2019-05-22 PROCEDURE — G8978 MOBILITY CURRENT STATUS: HCPCS

## 2019-05-22 PROCEDURE — G8988 SELF CARE GOAL STATUS: HCPCS

## 2019-05-22 PROCEDURE — G8979 MOBILITY GOAL STATUS: HCPCS

## 2019-05-22 PROCEDURE — 80048 BASIC METABOLIC PNL TOTAL CA: CPT | Performed by: NURSE PRACTITIONER

## 2019-05-22 PROCEDURE — 83036 HEMOGLOBIN GLYCOSYLATED A1C: CPT | Performed by: NURSE PRACTITIONER

## 2019-05-22 RX ORDER — AMLODIPINE BESYLATE 5 MG/1
5 TABLET ORAL DAILY
Status: DISCONTINUED | OUTPATIENT
Start: 2019-05-23 | End: 2019-05-23 | Stop reason: HOSPADM

## 2019-05-22 RX ORDER — LOSARTAN POTASSIUM 50 MG/1
50 TABLET ORAL DAILY
Status: DISCONTINUED | OUTPATIENT
Start: 2019-05-23 | End: 2019-05-23 | Stop reason: HOSPADM

## 2019-05-22 RX ADMIN — PANTOPRAZOLE SODIUM 40 MG: 40 TABLET, DELAYED RELEASE ORAL at 08:20

## 2019-05-22 RX ADMIN — INSULIN LISPRO 1 UNITS: 100 INJECTION, SOLUTION INTRAVENOUS; SUBCUTANEOUS at 13:11

## 2019-05-22 RX ADMIN — PRAVASTATIN SODIUM 20 MG: 20 TABLET ORAL at 16:25

## 2019-05-22 RX ADMIN — SITAGLIPTIN 50 MG: 50 TABLET, FILM COATED ORAL at 08:20

## 2019-05-22 RX ADMIN — LOSARTAN POTASSIUM 50 MG: 50 TABLET ORAL at 08:20

## 2019-05-22 RX ADMIN — Medication 1 TABLET: at 08:20

## 2019-05-22 RX ADMIN — INSULIN GLARGINE 24 UNITS: 100 INJECTION, SOLUTION SUBCUTANEOUS at 08:21

## 2019-05-22 RX ADMIN — ASPIRIN 81 MG 81 MG: 81 TABLET ORAL at 08:20

## 2019-05-22 RX ADMIN — AMLODIPINE BESYLATE 5 MG: 5 TABLET ORAL at 08:21

## 2019-05-22 RX ADMIN — GLIPIZIDE 5 MG: 5 TABLET ORAL at 08:20

## 2019-05-22 RX ADMIN — METFORMIN HYDROCHLORIDE 500 MG: 500 TABLET ORAL at 08:20

## 2019-05-22 RX ADMIN — ENOXAPARIN SODIUM 40 MG: 40 INJECTION SUBCUTANEOUS at 08:20

## 2019-05-22 RX ADMIN — POLYVINYL ALCOHOL 1 DROP: 14 SOLUTION/ DROPS OPHTHALMIC at 08:26

## 2019-05-22 RX ADMIN — POLYVINYL ALCOHOL 1 DROP: 14 SOLUTION/ DROPS OPHTHALMIC at 21:36

## 2019-05-22 RX ADMIN — MELATONIN 3 MG: at 21:36

## 2019-05-22 RX ADMIN — INSULIN LISPRO 1 UNITS: 100 INJECTION, SOLUTION INTRAVENOUS; SUBCUTANEOUS at 21:36

## 2019-05-22 RX ADMIN — PAROXETINE 40 MG: 20 TABLET, FILM COATED ORAL at 08:20

## 2019-05-23 ENCOUNTER — APPOINTMENT (INPATIENT)
Dept: RADIOLOGY | Facility: HOSPITAL | Age: 84
DRG: 092 | End: 2019-05-23
Payer: MEDICARE

## 2019-05-23 VITALS
HEART RATE: 76 BPM | SYSTOLIC BLOOD PRESSURE: 138 MMHG | HEIGHT: 64 IN | BODY MASS INDEX: 26.4 KG/M2 | RESPIRATION RATE: 18 BRPM | OXYGEN SATURATION: 98 % | DIASTOLIC BLOOD PRESSURE: 70 MMHG | TEMPERATURE: 99 F | WEIGHT: 154.6 LBS

## 2019-05-23 LAB
GLUCOSE SERPL-MCNC: 145 MG/DL (ref 65–140)
GLUCOSE SERPL-MCNC: 149 MG/DL (ref 65–140)
GLUCOSE SERPL-MCNC: 156 MG/DL (ref 65–140)
MRSA NOSE QL CULT: NORMAL

## 2019-05-23 PROCEDURE — 99239 HOSP IP/OBS DSCHRG MGMT >30: CPT | Performed by: FAMILY MEDICINE

## 2019-05-23 PROCEDURE — 93306 TTE W/DOPPLER COMPLETE: CPT | Performed by: INTERNAL MEDICINE

## 2019-05-23 PROCEDURE — 82948 REAGENT STRIP/BLOOD GLUCOSE: CPT

## 2019-05-23 PROCEDURE — 93880 EXTRACRANIAL BILAT STUDY: CPT | Performed by: SURGERY

## 2019-05-23 PROCEDURE — 93880 EXTRACRANIAL BILAT STUDY: CPT

## 2019-05-23 RX ADMIN — SITAGLIPTIN 50 MG: 50 TABLET, FILM COATED ORAL at 08:38

## 2019-05-23 RX ADMIN — PRAVASTATIN SODIUM 20 MG: 20 TABLET ORAL at 17:04

## 2019-05-23 RX ADMIN — Medication 1 TABLET: at 08:39

## 2019-05-23 RX ADMIN — GLIPIZIDE 5 MG: 5 TABLET ORAL at 08:39

## 2019-05-23 RX ADMIN — PAROXETINE 40 MG: 20 TABLET, FILM COATED ORAL at 08:38

## 2019-05-23 RX ADMIN — PANTOPRAZOLE SODIUM 40 MG: 40 TABLET, DELAYED RELEASE ORAL at 08:38

## 2019-05-23 RX ADMIN — ASPIRIN 81 MG 81 MG: 81 TABLET ORAL at 08:39

## 2019-05-23 RX ADMIN — LOSARTAN POTASSIUM 50 MG: 50 TABLET ORAL at 08:39

## 2019-05-23 RX ADMIN — INSULIN LISPRO 1 UNITS: 100 INJECTION, SOLUTION INTRAVENOUS; SUBCUTANEOUS at 08:39

## 2019-05-23 RX ADMIN — POLYVINYL ALCOHOL 1 DROP: 14 SOLUTION/ DROPS OPHTHALMIC at 08:39

## 2019-05-23 RX ADMIN — ENOXAPARIN SODIUM 40 MG: 40 INJECTION SUBCUTANEOUS at 08:38

## 2019-05-23 RX ADMIN — INSULIN GLARGINE 24 UNITS: 100 INJECTION, SOLUTION SUBCUTANEOUS at 08:39

## 2019-05-23 RX ADMIN — AMLODIPINE BESYLATE 5 MG: 5 TABLET ORAL at 08:38

## 2019-05-25 LAB
ATRIAL RATE: 77 BPM
P AXIS: 60 DEGREES
PR INTERVAL: 164 MS
QRS AXIS: 39 DEGREES
QRSD INTERVAL: 78 MS
QT INTERVAL: 382 MS
QTC INTERVAL: 432 MS
T WAVE AXIS: 77 DEGREES
VENTRICULAR RATE: 77 BPM

## 2019-05-25 PROCEDURE — 93010 ELECTROCARDIOGRAM REPORT: CPT | Performed by: INTERNAL MEDICINE

## 2022-08-15 ENCOUNTER — APPOINTMENT (EMERGENCY)
Dept: RADIOLOGY | Facility: HOSPITAL | Age: 87
End: 2022-08-15
Payer: MEDICARE

## 2022-08-15 ENCOUNTER — HOSPITAL ENCOUNTER (EMERGENCY)
Facility: HOSPITAL | Age: 87
Discharge: HOME | End: 2022-08-15
Attending: EMERGENCY MEDICINE | Admitting: EMERGENCY MEDICINE
Payer: MEDICARE

## 2022-08-15 VITALS
SYSTOLIC BLOOD PRESSURE: 131 MMHG | WEIGHT: 150 LBS | RESPIRATION RATE: 18 BRPM | TEMPERATURE: 98 F | BODY MASS INDEX: 25.75 KG/M2 | DIASTOLIC BLOOD PRESSURE: 90 MMHG | HEART RATE: 79 BPM | OXYGEN SATURATION: 96 %

## 2022-08-15 DIAGNOSIS — M79.604 RIGHT LEG PAIN: Primary | ICD-10-CM

## 2022-08-15 LAB
ALBUMIN SERPL BCP-MCNC: 3.5 G/DL (ref 3.5–5)
ALP SERPL-CCNC: 81 U/L (ref 46–116)
ALT SERPL W P-5'-P-CCNC: 18 U/L (ref 12–78)
ANION GAP SERPL CALCULATED.3IONS-SCNC: 12 MMOL/L (ref 4–13)
AST SERPL W P-5'-P-CCNC: 18 U/L (ref 5–45)
BASOPHILS # BLD AUTO: 0.05 THOUSANDS/ΜL (ref 0–0.1)
BASOPHILS NFR BLD AUTO: 1 % (ref 0–1)
BILIRUB SERPL-MCNC: 0.4 MG/DL (ref 0.2–1)
BUN SERPL-MCNC: 16 MG/DL (ref 5–25)
CALCIUM SERPL-MCNC: 9.3 MG/DL (ref 8.3–10.1)
CHLORIDE SERPL-SCNC: 103 MMOL/L (ref 96–108)
CO2 SERPL-SCNC: 27 MMOL/L (ref 21–32)
CREAT SERPL-MCNC: 1.43 MG/DL (ref 0.6–1.3)
EOSINOPHIL # BLD AUTO: 0.2 THOUSAND/ΜL (ref 0–0.61)
EOSINOPHIL NFR BLD AUTO: 2 % (ref 0–6)
ERYTHROCYTE [DISTWIDTH] IN BLOOD BY AUTOMATED COUNT: 13.2 % (ref 11.6–15.1)
FLUAV RNA RESP QL NAA+PROBE: NEGATIVE
FLUBV RNA RESP QL NAA+PROBE: NEGATIVE
GFR SERPL CREATININE-BSD FRML MDRD: 31 ML/MIN/1.73SQ M
GLUCOSE SERPL-MCNC: 119 MG/DL (ref 65–140)
GLUCOSE SERPL-MCNC: 157 MG/DL (ref 65–140)
HCT VFR BLD AUTO: 38.5 % (ref 34.8–46.1)
HGB BLD-MCNC: 12.1 G/DL (ref 11.5–15.4)
IMM GRANULOCYTES # BLD AUTO: 0.04 THOUSAND/UL (ref 0–0.2)
IMM GRANULOCYTES NFR BLD AUTO: 0 % (ref 0–2)
LYMPHOCYTES # BLD AUTO: 2.42 THOUSANDS/ΜL (ref 0.6–4.47)
LYMPHOCYTES NFR BLD AUTO: 26 % (ref 14–44)
MCH RBC QN AUTO: 29.7 PG (ref 26.8–34.3)
MCHC RBC AUTO-ENTMCNC: 31.4 G/DL (ref 31.4–37.4)
MCV RBC AUTO: 94 FL (ref 82–98)
MONOCYTES # BLD AUTO: 0.62 THOUSAND/ΜL (ref 0.17–1.22)
MONOCYTES NFR BLD AUTO: 7 % (ref 4–12)
NEUTROPHILS # BLD AUTO: 6.1 THOUSANDS/ΜL (ref 1.85–7.62)
NEUTS SEG NFR BLD AUTO: 64 % (ref 43–75)
NRBC BLD AUTO-RTO: 0 /100 WBCS
PLATELET # BLD AUTO: 279 THOUSANDS/UL (ref 149–390)
PMV BLD AUTO: 9.6 FL (ref 8.9–12.7)
POTASSIUM SERPL-SCNC: 3.6 MMOL/L (ref 3.5–5.3)
PROT SERPL-MCNC: 7.4 G/DL (ref 6.4–8.4)
RBC # BLD AUTO: 4.08 MILLION/UL (ref 3.81–5.12)
RSV RNA RESP QL NAA+PROBE: NEGATIVE
SARS-COV-2 RNA RESP QL NAA+PROBE: NEGATIVE
SODIUM SERPL-SCNC: 142 MMOL/L (ref 135–147)
WBC # BLD AUTO: 9.43 THOUSAND/UL (ref 4.31–10.16)

## 2022-08-15 PROCEDURE — 97163 PT EVAL HIGH COMPLEX 45 MIN: CPT

## 2022-08-15 PROCEDURE — 80053 COMPREHEN METABOLIC PANEL: CPT | Performed by: PHYSICIAN ASSISTANT

## 2022-08-15 PROCEDURE — 36415 COLL VENOUS BLD VENIPUNCTURE: CPT | Performed by: PHYSICIAN ASSISTANT

## 2022-08-15 PROCEDURE — 0241U HB NFCT DS VIR RESP RNA 4 TRGT: CPT | Performed by: PHYSICIAN ASSISTANT

## 2022-08-15 PROCEDURE — 82948 REAGENT STRIP/BLOOD GLUCOSE: CPT

## 2022-08-15 PROCEDURE — 99285 EMERGENCY DEPT VISIT HI MDM: CPT

## 2022-08-15 PROCEDURE — 99284 EMERGENCY DEPT VISIT MOD MDM: CPT | Performed by: PHYSICIAN ASSISTANT

## 2022-08-15 PROCEDURE — 73560 X-RAY EXAM OF KNEE 1 OR 2: CPT

## 2022-08-15 PROCEDURE — 73590 X-RAY EXAM OF LOWER LEG: CPT

## 2022-08-15 PROCEDURE — 97530 THERAPEUTIC ACTIVITIES: CPT

## 2022-08-15 PROCEDURE — 85025 COMPLETE CBC W/AUTO DIFF WBC: CPT | Performed by: PHYSICIAN ASSISTANT

## 2022-08-15 RX ORDER — MIRTAZAPINE 15 MG/1
15 TABLET, FILM COATED ORAL
COMMUNITY

## 2022-08-15 RX ORDER — MEMANTINE HYDROCHLORIDE 5 MG/1
5 TABLET ORAL DAILY
COMMUNITY

## 2022-08-15 RX ORDER — OXYCODONE HYDROCHLORIDE 5 MG/1
5 TABLET ORAL EVERY 4 HOURS PRN
Qty: 9 TABLET | Refills: 0 | Status: SHIPPED | OUTPATIENT
Start: 2022-08-15 | End: 2022-08-18

## 2022-08-15 RX ORDER — OXYCODONE HYDROCHLORIDE 10 MG/1
5 TABLET ORAL EVERY 4 HOURS PRN
Qty: 9 TABLET | Refills: 0 | Status: SHIPPED | OUTPATIENT
Start: 2022-08-15 | End: 2022-08-15 | Stop reason: SDUPTHER

## 2022-08-15 RX ORDER — HYDROXYZINE HYDROCHLORIDE 25 MG/1
25 TABLET, FILM COATED ORAL
COMMUNITY

## 2022-08-15 RX ORDER — OXYCODONE HYDROCHLORIDE 5 MG/1
5 TABLET ORAL ONCE
Status: COMPLETED | OUTPATIENT
Start: 2022-08-15 | End: 2022-08-15

## 2022-08-15 RX ORDER — ACETAMINOPHEN 325 MG/1
650 TABLET ORAL ONCE
Status: COMPLETED | OUTPATIENT
Start: 2022-08-15 | End: 2022-08-15

## 2022-08-15 RX ORDER — ACETAMINOPHEN 325 MG/1
650 TABLET ORAL EVERY 6 HOURS
Qty: 112 TABLET | Refills: 0
Start: 2022-08-15 | End: 2022-08-29

## 2022-08-15 RX ADMIN — ACETAMINOPHEN 650 MG: 325 TABLET, FILM COATED ORAL at 09:36

## 2022-08-15 RX ADMIN — OXYCODONE HYDROCHLORIDE 5 MG: 5 TABLET ORAL at 14:16

## 2022-08-15 RX ADMIN — ACETAMINOPHEN 650 MG: 325 TABLET, FILM COATED ORAL at 19:08

## 2022-08-15 NOTE — CASE MANAGEMENT
Case Management ED Progress Note    Patient name Medardo Remy  Location ED 12/ED 12 MRN [id number]  : 3/25/1930 Date 8/15/2022        OBJECTIVE:  Predictive Model Details         64% Factor Value    Risk of Hospital Admission or ED Visit Model Number of ED Visits 1     Has Medicaid Yes     Is in Relationship No     Has Medicare Yes     Has Depression Yes     Has PCP Yes            Chief Complaint: Leg pain   Patient Class: Emergency  Preferred Pharmacy:   [facility] the [facility] [address]  [address]  [facility] [address]  Phone: [phone] Fax: [phone]    Primary Care Provider: Darcie Hansen DO    Primary Insurance: MEDICARE  Secondary Insurance: [address] [facility]    ED Progress Note:    CM aware patient recommended from STR  Patient admitted from [facility] who confirmed with patient's [facility]B status would be unable to return  Alphonso Bae confirmed patient has 4 COVID vax - card received via fax and uploaded to Epic  Referrals opened via Kadi Krueger  Patient's choice is for placement at [facility]  Level 1 PASRR completed  ED provider made aware, ED staff to setup transport to admit to [facility]  Call attempted x 2 to son, Sarah Finney, no answer  CM left VM requesting CB

## 2022-08-15 NOTE — ED NOTES
Pt status update give to Kristen Avery from [facility]         Pavel Pineda, JIGNA  08/15/22 3365

## 2022-08-15 NOTE — ED NOTES
Attempted report to complete care at [facility]     Sarah Alexander RN  08/15/22 6927

## 2022-08-15 NOTE — PHYSICAL THERAPY NOTE
PT EVALUATION     08/15/22 1220   Note Type   Note type Evaluation   Pain Assessment   Pain Assessment Tool 0-10   Pain Score 10 - Worst Possible Pain   Pain Location/Orientation   (R knee with weight bearing)   Restrictions/Precautions   Weight Bearing Precautions Per Order No   Home Living   Type of Home Assisted living   Home Equipment Walker   Prior Function   Level of Harmon   (pt reports that she is ambulatory with a walker)   Lives With Facility staff   Receives Help From Personal care attendant   General   Additional Pertinent History Pt went out to lunch with her son yesterday and hit her R knee on the car door  X-rays negative for fx  Pt reports she is unable to walk due to the pain  Cognition   Arousal/Participation Cooperative   Following Commands Follows one step commands without difficulty   Subjective   Subjective "I have to go to the bathroom"   RLE Assessment   RLE Assessment   (ROM WFL x R knee 0-90 limited by pain, MMT hip 3-/5, knee 3/5, ankle 4-/5)   LLE Assessment   LLE Assessment   (ROM WFL, MMT 4/5)   Bed Mobility   Supine to Sit 3  Moderate assistance   Additional items LE management; Increased time required   Sit to Supine 3  Moderate assistance   Additional items LE management; Increased time required   Transfers   Sit to Stand 4  Minimal assistance   Additional items   (from stretcher)   Stand to Sit 4  Minimal assistance   Stand pivot Unable to assess   Ambulation/Elevation   Additional items   (Pt able to advance her R LE but unable to bear any weight even with a walker and max assist to take a step)   Balance   Static Sitting Fair +   Dynamic Sitting Fair   Static Standing Fair   Dynamic Standing   (with walker)   Assessment   Prognosis Good   Problem List Decreased strength;Decreased range of motion;Pain   Assessment Patient seen for Physical Therapy evaluation  Patient admitted with R knee pain  Comorbidities affecting patient's physical performance include: depression  Personal factors affecting patient at time of initial evaluation include: ambulating with assistive device, inability to ambulate household distances, depression and inability to perform physical activity  Prior to admission, patient was independent with functional mobility with walker  Please find objective findings from Physical Therapy assessment regarding body systems outlined above with impairments and limitations including weakness, decreased ROM, pain, decreased functional mobility tolerance and fall risk  The Barthel Index was used as a functional outcome tool presenting with a score of Barthel Index Score: 45 today indicating marked limitations of functional mobility and ADLS  Patient's clinical presentation is currently unstable/unpredictable as seen in patient's presentation of changing level of pain, increased fall risk, new onset of impairment of functional mobility and new onset of weakness  Pt would benefit from continued Physical Therapy treatment to address deficits as defined above and maximize level of functional mobility  As demonstrated by objective findings, the assigned level of complexity for this evaluation is high  The patient's AM-[facility] Basic Mobility Inpatient Short Form Raw Score is 10  A Raw score of less than or equal to 16 suggests the patient may benefit from discharge to post-acute rehabilitation services  Goals   Patient Goals "walk to the bathroom"   STG Expiration Date 08/22/22   Short Term Goal #1 improve R knee ROM to WNL, improve R knee strength to at least 4/5, independent bed mobility, independent transfers with a walker, pt will ambulate 20 feet with min assist with a walker   LTG Expiration Date 08/29/22   Long Term Goal #1 independent ambulation with a walker 75 feet indoor level surfaces with less than 2/10 R knee pain   Plan   Treatment/Interventions Functional transfer training;LE strengthening/ROM; Therapeutic exercise;Gait training;Bed mobility; Equipment eval/education;Patient/family training;Spoke to MD   PT Frequency Other (Comment)  (5w)   Recommendation   PT Discharge Recommendation Post acute rehabilitation services   AM-PAC Basic Mobility Inpatient   Turning in Bed Without Bedrails 2   Lying on Back to Sitting on Edge of Flat Bed 2   Moving Bed to Chair 2   Standing Up From Chair 2   Walk in Room 1   Climb 3-5 Stairs 1   Basic Mobility Inpatient Raw Score 10   Turning Head Towards Sound 4   Follow Simple Instructions 4   Low Function Basic Mobility Raw Score 18   Low Function Basic Mobility Standardized Score 29 25   Highest Level Of Mobility   -HL Goal 4: Move to chair/commode   JH-HLM Achieved 3: Sit at edge of bed  (pt unable to transfer due to R knee pain)   Barthel Index   Feeding 10   Bathing 0   Grooming Score 0   Dressing Score 5   Bladder Score 10   Bowels Score 10   Toilet Use Score 5   Transfers (Bed/Chair) Score 5   Mobility (Level Surface) Score 0   Stairs Score 0   Barthel Index Score 45   Additional Treatment Session   Start Time 1210   End Time 1220   Treatment Assessment S:  "I have to pee" O: Sit to stand x 2 trials with min asssit from stretcher  Cues to push onto arms to decrease WB on R LE however pt still unable to take a step  Pt was able to scoot to the head of the stretcher with min assist   Pt rolled to the R with rails and min assist to get on the bedpan as pt was unable to walk to the bathroom  A:  R knee pain limits pt's function  Pt is unable to walk despite education and multiple attempts P:  Recommend STR, continue working on R knee ROM/strength, and ability to bear weight so pt can walk with a walker     End of Consult   Patient Position at End of Consult Other (comment)  (supine on bed pain with call bell, RN aware)   Licensure   [place] License Number  Wilver Falling [id number]

## 2022-08-15 NOTE — ED PROVIDER NOTES
History  Chief Complaint   Patient presents with    Leg Pain     Patient hit leg off car door yesterday, states today cannot bare weight on right leg/ ankle  Slight swelling noticed on triage, no bruising     Pt is a 81 yo F with PMH of DM2, HTN, HLD, who presents from [facility] for evaluation of right knee pain  Pt states she was exiting a vehicle yesterday when the door of the car hit her knee  She is unable to bear weight or ambulate today  She describes the pain as 8/10 - 10/10, throbbing, sharp and shooting, constant and unrelieved by acetaminophen, worsened by activity and weight bearing  She specifically denies fever, chills, n/v, chest pain, or sob  Knee Pain  Location:  Knee  Time since incident:  1 day  Injury: yes    Mechanism of injury comment:  Car door hit knee  Knee location:  R knee  Pain details:     Quality:  Aching, throbbing, shooting and sharp    Radiates to:  Does not radiate    Severity:  Severe    Onset quality:  Sudden    Duration:  1 day    Timing:  Constant    Progression:  Unchanged  Chronicity:  New  Dislocation: no    Foreign body present:  No foreign bodies  Prior injury to area:  No  Relieved by:  Nothing  Worsened by: Activity and bearing weight  Ineffective treatments:  Arthritis medication  Associated symptoms: decreased ROM, stiffness and swelling    Associated symptoms: no back pain, no fatigue, no fever, no itching, no muscle weakness, no neck pain, no numbness and no tingling    Swelling:     Location:  Leg    Onset quality:  Sudden    Duration:  1 day    Timing:  Constant    Progression:  Worsening    Chronicity:  New  Risk factors: no concern for non-accidental trauma, no frequent fractures, no known bone disorder, no obesity and no recent illness        Prior to Admission Medications   Prescriptions Last Dose Informant Patient Reported? Taking?    Melatonin 3-10 MG TABS  Care Giver Yes No   Sig: Take 3 mg by mouth   Menthol-Zinc Oxide (REMEDY CALAZIME EX)   Yes No Sig: Apply topically   PARoxetine (PAXIL) 30 mg tablet  Care Giver Yes No   Sig: Take 40 mg by mouth every morning     acetaminophen (TYLENOL) 500 mg tablet  Self Yes No   Sig: Take 1,000 mg by mouth every 6 (six) hours as needed for mild pain    acetaminophen-codeine (TYLENOL #3) 300-30 mg per tablet   Yes No   Sig: Take 1 tablet by mouth every 6 (six) hours as needed for moderate pain   amLODIPine (NORVASC) 5 mg tablet   Yes No   Sig: Take 5 mg by mouth daily  cycloSPORINE (RESTASIS) 0 05 % ophthalmic emulsion   Yes No   Sig: Administer 1 drop to both eyes 2 (two) times a day  glipiZIDE (GLUCOTROL) 10 mg tablet  Care Giver Yes No   Sig: Take 5 mg by mouth daily    insulin glargine (BASAGLAR KWIKPEN) 100 units/mL injection pen  Care Giver Yes No   Sig: Inject 24 Units under the skin daily    losartan (COZAAR) 50 mg tablet   Yes No   Sig: Take 50 mg by mouth daily  metFORMIN (GLUCOPHAGE) 500 mg tablet  Care Giver Yes No   Sig: Take 500 mg by mouth 2 (two) times a day with meals   multivitamin (THERAGRAN) TABS   Yes No   Sig: Take 1 tablet by mouth daily  nystatin (MYCOSTATIN) cream   Yes No   Sig: Apply 1 application topically 2 (two) times a day Under both breasts   pantoprazole (PROTONIX) 40 mg tablet   Yes No   Sig: Take 40 mg by mouth daily  simvastatin (ZOCOR) 10 mg tablet   Yes No   Sig: Take 10 mg by mouth daily at bedtime     sitaGLIPtin (JANUVIA) 100 mg tablet   No No   Sig: Take 1 tablet (100 mg total) by mouth daily      Facility-Administered Medications: None       Past Medical History:   Diagnosis Date    Depression     Diabetes mellitus ([facility] [address] )     Gait difficulty     Hyperlipidemia     Hypertension     Incontinence of urine     Macula lutea degeneration     Osteoarthritis        Past Surgical History:   Procedure Laterality Date    APPENDECTOMY      HYSTERECTOMY      ORIF SHOULDER DISLOCATION W/ HUMERAL FRACTURE         Family History   Problem Relation Age of Onset    Heart disease Mother     No Known Problems Father     No Known Problems Sister     No Known Problems Brother     No Known Problems Maternal Aunt     No Known Problems Maternal Uncle     No Known Problems Paternal Aunt     No Known Problems Paternal Uncle     No Known Problems Maternal Grandmother     No Known Problems Maternal Grandfather     No Known Problems Paternal Grandmother     No Known Problems Paternal Grandfather     ADD / ADHD Neg Hx     Anesthesia problems Neg Hx     Cancer Neg Hx     Clotting disorder Neg Hx     Collagen disease Neg Hx     Diabetes Neg Hx     Dislocations Neg Hx     Learning disabilities Neg Hx     Neurological problems Neg Hx     Osteoporosis Neg Hx     Rheumatologic disease Neg Hx     Scoliosis Neg Hx     Vascular Disease Neg Hx      I have reviewed and agree with the history as documented  E-Cigarette/Vaping     E-Cigarette/Vaping Substances     Social History     Tobacco Use    Smoking status: Never Smoker    Smokeless tobacco: Never Used   Substance Use Topics    Alcohol use: Not Currently    Drug use: Not Currently       Review of Systems   Constitutional: Negative for chills, fatigue and fever  HENT: Negative for ear pain and sore throat  Eyes: Negative for pain and visual disturbance  Respiratory: Negative for cough and shortness of breath  Cardiovascular: Negative for chest pain and palpitations  Gastrointestinal: Negative for abdominal pain and vomiting  Endocrine: Negative  Genitourinary: Negative for dysuria and hematuria  Musculoskeletal: Positive for arthralgias, gait problem, joint swelling and stiffness  Negative for back pain and neck pain  Skin: Negative for color change, itching and rash  Allergic/Immunologic: Negative  Neurological: Negative for seizures and syncope  Hematological: Negative  Psychiatric/Behavioral: Negative  All other systems reviewed and are negative        Physical Exam  Physical Exam  Vitals and nursing note reviewed  Constitutional:       General: She is not in acute distress  Appearance: Normal appearance  She is well-developed and normal weight  HENT:      Head: Normocephalic and atraumatic  Nose: Nose normal       Mouth/Throat:      Mouth: Mucous membranes are moist    Eyes:      Conjunctiva/sclera: Conjunctivae normal       Pupils: Pupils are equal, round, and reactive to light  Cardiovascular:      Rate and Rhythm: Normal rate and regular rhythm  Pulses: Normal pulses  Heart sounds: No murmur heard  Pulmonary:      Effort: Pulmonary effort is normal  No respiratory distress  Breath sounds: Normal breath sounds  Abdominal:      General: Abdomen is flat  Palpations: Abdomen is soft  Tenderness: There is no abdominal tenderness  Musculoskeletal:         General: Swelling, tenderness and signs of injury present  No deformity  Cervical back: Normal range of motion and neck supple  Legs:    Skin:     General: Skin is warm and dry  Capillary Refill: Capillary refill takes less than 2 seconds  Neurological:      General: No focal deficit present  Mental Status: She is alert and oriented to person, place, and time  Cranial Nerves: No cranial nerve deficit  Sensory: No sensory deficit        Comments: [facility]   Psychiatric:         Mood and Affect: Mood normal          Vital Signs  ED Triage Vitals [08/15/22 0839]   Temperature Pulse Respirations Blood Pressure SpO2   98 °F (36 7 °C) 72 17 (!) 187/82 95 %      Temp Source Heart Rate Source Patient Position - Orthostatic VS BP Location FiO2 (%)   Oral Monitor Sitting Right arm --      Pain Score       7           Vitals:    08/15/22 0839 08/15/22 1554   BP: (!) 187/82 (!) 186/80   Pulse: 72 68   Patient Position - Orthostatic VS: Sitting          Visual Acuity      ED Medications  Medications   acetaminophen (TYLENOL) tablet 650 mg (650 mg Oral Not Given 8/15/22 0937) oxyCODONE (ROXICODONE) IR tablet 5 mg (5 mg Oral Given 8/15/22 1416)       Diagnostic Studies  Results Reviewed     Procedure Component Value Units Date/Time    Fingerstick Glucose (POCT) [[id number]]  (Normal) Collected: 08/15/22 1643    Lab Status: Final result Updated: 08/15/22 1645     POC Glucose 119 mg/dl     FLU/RSV/COVID - if FLU/RSV clinically relevant [[id number]]  (Normal) Collected: 08/15/22 1246    Lab Status: Final result Specimen: Nares from Nose Updated: 08/15/22 1337     SARS-CoV-2 Negative     INFLUENZA A PCR Negative     INFLUENZA B PCR Negative     RSV PCR Negative    Narrative:      FOR PEDIATRIC PATIENTS - copy/paste COVID Guidelines URL to browser: [email or web address]  [employer]    SARS-CoV-2 assay is a Nucleic Acid Amplification assay intended for the  qualitative detection of nucleic acid from SARS-CoV-2 in nasopharyngeal  swabs  Results are for the presumptive identification of SARS-CoV-2 RNA  Positive results are indicative of infection with SARS-CoV-2, the virus  causing COVID-19, but do not rule out bacterial infection or co-infection  with other viruses  Laboratories within the [place] and its  territories are required to report all positive results to the appropriate  public health authorities  Negative results do not preclude SARS-CoV-2  infection and should not be used as the sole basis for treatment or other  patient management decisions  Negative results must be combined with  clinical observations, patient history, and epidemiological information  This test has not been FDA cleared or approved  This test has been authorized by FDA under an Emergency Use Authorization  (EUA)   This test is only authorized for the duration of time the  declaration that circumstances exist justifying the authorization of the  emergency use of an in vitro diagnostic tests for detection of SARS-CoV-2  virus and/or diagnosis of COVID-19 infection under section 564(b)(1) of  the Act, 21 U  S C  831JTJ-4(N)(8), unless the authorization is terminated  or revoked sooner  The test has been validated but independent review by FDA  and CLIA is pending  Test performed using [employer] Gene[employer]pert: This RT-PCR assay targets N2,  a region unique to SARS-CoV-2  A conserved region in the E-gene was chosen  for pan-Sarbecovirus detection which includes SARS-CoV-2      Comprehensive metabolic panel [[id number]]  (Abnormal) Collected: 08/15/22 1246    Lab Status: Final result Specimen: Blood from Arm, Left Updated: 08/15/22 1306     Sodium 142 mmol/L      Potassium 3 6 mmol/L      Chloride 103 mmol/L      CO2 27 mmol/L      ANION GAP 12 mmol/L      BUN 16 mg/dL      Creatinine 1 43 mg/dL      Glucose 157 mg/dL      Calcium 9 3 mg/dL      AST 18 U/L      ALT 18 U/L      Alkaline Phosphatase 81 U/L      Total Protein 7 4 g/dL      Albumin 3 5 g/dL      Total Bilirubin 0 40 mg/dL      eGFR 31 ml/min/1 73sq m     Narrative:      National Kidney Disease Foundation guidelines for Chronic Kidney Disease (CKD):     Stage 1 with normal or high GFR (GFR > 90 mL/min/1 73 square meters)    Stage 2 Mild CKD (GFR = 60-89 mL/min/1 73 square meters)    Stage 3A Moderate CKD (GFR = 45-59 mL/min/1 73 square meters)    Stage 3B Moderate CKD (GFR = 30-44 mL/min/1 73 square meters)    Stage 4 Severe CKD (GFR = 15-29 mL/min/1 73 square meters)    Stage 5 End Stage CKD (GFR <15 mL/min/1 73 square meters)  Note: GFR calculation is accurate only with a steady state creatinine    CBC and differential [[id number]] Collected: 08/15/22 1246    Lab Status: Final result Specimen: Blood from Arm, Left Updated: 08/15/22 1252     WBC 9 43 Thousand/uL      RBC 4 08 Million/uL      Hemoglobin 12 1 g/dL      Hematocrit 38 5 %      MCV 94 fL      MCH 29 7 pg      MCHC 31 4 g/dL      RDW 13 2 %      MPV 9 6 fL      Platelets 871 Thousands/uL      nRBC 0 /100 WBCs      Neutrophils Relative 64 %      Immat GRANS % 0 %      Lymphocytes Relative 26 %      Monocytes Relative 7 %      Eosinophils Relative 2 %      Basophils Relative 1 %      Neutrophils Absolute 6 10 Thousands/µL      Immature Grans Absolute 0 04 Thousand/uL      Lymphocytes Absolute 2 42 Thousands/µL      Monocytes Absolute 0 62 Thousand/µL      Eosinophils Absolute 0 20 Thousand/µL      Basophils Absolute 0 05 Thousands/µL                  XR tibia fibula 2 views RIGHT   Final Result by Katey Levin MD (08/15 0940)      No acute osseous abnormality  Workstation performed: [id number]         XR knee 1 or 2 vw right   Final Result by Katey Levin MD (59/26 0197)      No acute osseous abnormality  Degenerative changes as described              Workstation performed: [id number]                    Procedures  Procedures         ED Course  ED Course as of 08/15/22 1822   Mon Aug 15, 2022   1409 Lm for son on VM                                               MDM  Number of Diagnoses or Management Options  Right leg pain: new and requires workup  Diagnosis management comments: Pt with R knee/leg pain and resultant ambulatory dysfunction   Xray without acute findings   Pt evaluated by PT - pt recommended for ARC  [facility]  I spoke with patients son and informed him of his mothers condition       Amount and/or Complexity of Data Reviewed  Clinical lab tests: reviewed and ordered  Tests in the radiology section of CPT®: ordered and reviewed  Tests in the medicine section of CPT®: reviewed and ordered  Decide to obtain previous medical records or to obtain history from someone other than the patient: yes  Review and summarize past medical records: yes  Independent visualization of images, tracings, or specimens: yes    Risk of Complications, Morbidity, and/or Mortality  Presenting problems: moderate  Diagnostic procedures: moderate  Management options: moderate    Patient Progress  Patient progress: stable      Disposition  Final diagnoses:   Right leg pain     Time reflects when diagnosis was documented in both MDM as applicable and the Disposition within this note     Time User Action Codes Description Comment    8/15/2022  2:29 PM Monica Bird Add [[id number] [id number]] Right leg pain       ED Disposition     ED Disposition   Discharge    Condition   Stable    Date/Time   Mon Aug 15, 2022  2:29 PM    Comment   Odalis Roblero discharge to home/self care  MD Documentation    [address] @ [place], [place]      RN Documentation    [address] @ [place], [place]      Follow-up Information     Follow up With Specialties Details Why Contact Info    Shena Vergara DO Family Medicine Call  As needed [address] [place] [address]  [phone]            Patient's Medications   Discharge Prescriptions    ACETAMINOPHEN (TYLENOL) 325 MG TABLET    Take 2 tablets (650 mg total) by mouth every 6 (six) hours for 14 days       Start Date: 8/15/2022 End Date: 8/29/2022       Order Dose: 650 mg       Quantity: 112 tablet    Refills: 0    OXYCODONE (ROXICODONE) 5 IMMEDIATE RELEASE TABLET    Take 1 tablet (5 mg total) by mouth every 4 (four) hours as needed for moderate pain for up to 3 days Max Daily Amount: 30 mg       Start Date: 8/15/2022 End Date: 8/18/2022       Order Dose: 5 mg       Quantity: 9 tablet    Refills: 0       No discharge procedures on file      PDMP Review     None          ED Provider  Electronically Signed by           Claude Schroeder, PA-C  08/15/22 Miracle Forbes

## 2022-08-15 NOTE — ED NOTES
Report given to EBONY SIMPSON [facility] at [address] [facility], RN  08/15/22 9309

## 2024-03-16 ENCOUNTER — HOSPITAL ENCOUNTER (EMERGENCY)
Facility: HOSPITAL | Age: 89
Discharge: HOME | End: 2024-03-16
Attending: EMERGENCY MEDICINE
Payer: MEDICARE

## 2024-03-16 ENCOUNTER — APPOINTMENT (EMERGENCY)
Dept: RADIOLOGY | Facility: HOSPITAL | Age: 89
End: 2024-03-16
Payer: MEDICARE

## 2024-03-16 VITALS
DIASTOLIC BLOOD PRESSURE: 64 MMHG | SYSTOLIC BLOOD PRESSURE: 138 MMHG | OXYGEN SATURATION: 94 % | RESPIRATION RATE: 40 BRPM | TEMPERATURE: 99.3 F | HEART RATE: 94 BPM

## 2024-03-16 DIAGNOSIS — R53.1 WEAKNESS: Primary | ICD-10-CM

## 2024-03-16 DIAGNOSIS — U07.1 COVID-19: ICD-10-CM

## 2024-03-16 DIAGNOSIS — N39.0 UTI (URINARY TRACT INFECTION): ICD-10-CM

## 2024-03-16 DIAGNOSIS — E83.42 HYPOMAGNESEMIA: ICD-10-CM

## 2024-03-16 LAB
ALBUMIN SERPL BCP-MCNC: 3.8 G/DL (ref 3.5–5)
ALP SERPL-CCNC: 51 U/L (ref 34–104)
ALT SERPL W P-5'-P-CCNC: 8 U/L (ref 7–52)
ANION GAP SERPL CALCULATED.3IONS-SCNC: 9 MMOL/L (ref 4–13)
AST SERPL W P-5'-P-CCNC: 14 U/L (ref 13–39)
ATRIAL RATE: 97 BPM
BACTERIA UR QL AUTO: ABNORMAL /HPF
BASOPHILS # BLD AUTO: 0.02 THOUSANDS/ÂΜL (ref 0–0.1)
BASOPHILS NFR BLD AUTO: 0 % (ref 0–1)
BILIRUB SERPL-MCNC: 0.43 MG/DL (ref 0.2–1)
BILIRUB UR QL STRIP: ABNORMAL
BUN SERPL-MCNC: 23 MG/DL (ref 5–25)
CALCIUM SERPL-MCNC: 9.4 MG/DL (ref 8.4–10.2)
CHLORIDE SERPL-SCNC: 99 MMOL/L (ref 96–108)
CLARITY UR: ABNORMAL
CO2 SERPL-SCNC: 23 MMOL/L (ref 21–32)
COLOR UR: ABNORMAL
CREAT SERPL-MCNC: 1.11 MG/DL (ref 0.6–1.3)
EOSINOPHIL # BLD AUTO: 0 THOUSAND/ÂΜL (ref 0–0.61)
EOSINOPHIL NFR BLD AUTO: 0 % (ref 0–6)
ERYTHROCYTE [DISTWIDTH] IN BLOOD BY AUTOMATED COUNT: 13 % (ref 11.6–15.1)
FLUAV RNA RESP QL NAA+PROBE: NEGATIVE
FLUBV RNA RESP QL NAA+PROBE: NEGATIVE
GFR SERPL CREATININE-BSD FRML MDRD: 42 ML/MIN/1.73SQ M
GLUCOSE SERPL-MCNC: 172 MG/DL (ref 65–140)
GLUCOSE UR STRIP-MCNC: NEGATIVE MG/DL
HCT VFR BLD AUTO: 34.4 % (ref 34.8–46.1)
HGB BLD-MCNC: 11.1 G/DL (ref 11.5–15.4)
HGB UR QL STRIP.AUTO: ABNORMAL
IMM GRANULOCYTES # BLD AUTO: 0.03 THOUSAND/UL (ref 0–0.2)
IMM GRANULOCYTES NFR BLD AUTO: 0 % (ref 0–2)
KETONES UR STRIP-MCNC: ABNORMAL MG/DL
LACTATE SERPL-SCNC: 1.7 MMOL/L (ref 0.5–2)
LEUKOCYTE ESTERASE UR QL STRIP: ABNORMAL
LYMPHOCYTES # BLD AUTO: 0.71 THOUSANDS/ÂΜL (ref 0.6–4.47)
LYMPHOCYTES NFR BLD AUTO: 7 % (ref 14–44)
MAGNESIUM SERPL-MCNC: 1.3 MG/DL (ref 1.9–2.7)
MCH RBC QN AUTO: 30.2 PG (ref 26.8–34.3)
MCHC RBC AUTO-ENTMCNC: 32.3 G/DL (ref 31.4–37.4)
MCV RBC AUTO: 94 FL (ref 82–98)
MONOCYTES # BLD AUTO: 0.64 THOUSAND/ÂΜL (ref 0.17–1.22)
MONOCYTES NFR BLD AUTO: 7 % (ref 4–12)
NEUTROPHILS # BLD AUTO: 8.2 THOUSANDS/ÂΜL (ref 1.85–7.62)
NEUTS SEG NFR BLD AUTO: 86 % (ref 43–75)
NITRITE UR QL STRIP: NEGATIVE
NON-SQ EPI CELLS URNS QL MICRO: ABNORMAL /HPF
NRBC BLD AUTO-RTO: 0 /100 WBCS
OTHER STN SPEC: ABNORMAL
P AXIS: -24 DEGREES
PH UR STRIP.AUTO: 6 [PH]
PLATELET # BLD AUTO: 261 THOUSANDS/UL (ref 149–390)
PMV BLD AUTO: 10.2 FL (ref 8.9–12.7)
POTASSIUM SERPL-SCNC: 4.2 MMOL/L (ref 3.5–5.3)
PR INTERVAL: 150 MS
PROT SERPL-MCNC: 6.7 G/DL (ref 6.4–8.4)
PROT UR STRIP-MCNC: ABNORMAL MG/DL
QRS AXIS: 19 DEGREES
QRSD INTERVAL: 78 MS
QT INTERVAL: 346 MS
QTC INTERVAL: 439 MS
RBC # BLD AUTO: 3.67 MILLION/UL (ref 3.81–5.12)
RBC #/AREA URNS AUTO: ABNORMAL /HPF
RSV RNA RESP QL NAA+PROBE: NEGATIVE
SARS-COV-2 RNA RESP QL NAA+PROBE: POSITIVE
SODIUM SERPL-SCNC: 131 MMOL/L (ref 135–147)
SP GR UR STRIP.AUTO: 1.02 (ref 1–1.03)
T WAVE AXIS: 117 DEGREES
UROBILINOGEN UR QL STRIP.AUTO: 0.2 E.U./DL
VENTRICULAR RATE: 97 BPM
WBC # BLD AUTO: 9.6 THOUSAND/UL (ref 4.31–10.16)
WBC #/AREA URNS AUTO: ABNORMAL /HPF

## 2024-03-16 PROCEDURE — 87086 URINE CULTURE/COLONY COUNT: CPT | Performed by: EMERGENCY MEDICINE

## 2024-03-16 PROCEDURE — 87077 CULTURE AEROBIC IDENTIFY: CPT | Performed by: EMERGENCY MEDICINE

## 2024-03-16 PROCEDURE — 99285 EMERGENCY DEPT VISIT HI MDM: CPT

## 2024-03-16 PROCEDURE — 36415 COLL VENOUS BLD VENIPUNCTURE: CPT | Performed by: EMERGENCY MEDICINE

## 2024-03-16 PROCEDURE — 83605 ASSAY OF LACTIC ACID: CPT | Performed by: EMERGENCY MEDICINE

## 2024-03-16 PROCEDURE — 87040 BLOOD CULTURE FOR BACTERIA: CPT | Performed by: EMERGENCY MEDICINE

## 2024-03-16 PROCEDURE — 99284 EMERGENCY DEPT VISIT MOD MDM: CPT | Performed by: EMERGENCY MEDICINE

## 2024-03-16 PROCEDURE — 96367 TX/PROPH/DG ADDL SEQ IV INF: CPT

## 2024-03-16 PROCEDURE — 80053 COMPREHEN METABOLIC PANEL: CPT | Performed by: EMERGENCY MEDICINE

## 2024-03-16 PROCEDURE — 93005 ELECTROCARDIOGRAM TRACING: CPT

## 2024-03-16 PROCEDURE — 0241U HB NFCT DS VIR RESP RNA 4 TRGT: CPT | Performed by: EMERGENCY MEDICINE

## 2024-03-16 PROCEDURE — 96365 THER/PROPH/DIAG IV INF INIT: CPT

## 2024-03-16 PROCEDURE — 85025 COMPLETE CBC W/AUTO DIFF WBC: CPT | Performed by: EMERGENCY MEDICINE

## 2024-03-16 PROCEDURE — 83735 ASSAY OF MAGNESIUM: CPT | Performed by: EMERGENCY MEDICINE

## 2024-03-16 PROCEDURE — 87186 SC STD MICRODIL/AGAR DIL: CPT | Performed by: EMERGENCY MEDICINE

## 2024-03-16 PROCEDURE — 71045 X-RAY EXAM CHEST 1 VIEW: CPT

## 2024-03-16 PROCEDURE — 81001 URINALYSIS AUTO W/SCOPE: CPT | Performed by: EMERGENCY MEDICINE

## 2024-03-16 RX ORDER — CEFTRIAXONE 1 G/50ML
1000 INJECTION, SOLUTION INTRAVENOUS ONCE
Status: COMPLETED | OUTPATIENT
Start: 2024-03-16 | End: 2024-03-16

## 2024-03-16 RX ORDER — MAGNESIUM SULFATE HEPTAHYDRATE 40 MG/ML
2 INJECTION, SOLUTION INTRAVENOUS ONCE
Status: COMPLETED | OUTPATIENT
Start: 2024-03-16 | End: 2024-03-16

## 2024-03-16 RX ORDER — CEFADROXIL 500 MG/1
500 CAPSULE ORAL EVERY 12 HOURS SCHEDULED
Qty: 14 CAPSULE | Refills: 0 | Status: SHIPPED | OUTPATIENT
Start: 2024-03-16 | End: 2024-03-23

## 2024-03-16 RX ADMIN — SODIUM CHLORIDE 1000 ML: 0.9 INJECTION, SOLUTION INTRAVENOUS at 19:35

## 2024-03-16 RX ADMIN — MAGNESIUM SULFATE HEPTAHYDRATE 2 G: 40 INJECTION, SOLUTION INTRAVENOUS at 20:31

## 2024-03-16 RX ADMIN — CEFTRIAXONE 1000 MG: 1 INJECTION, SOLUTION INTRAVENOUS at 20:05

## 2024-03-16 NOTE — ED PROVIDER NOTES
History  Chief Complaint   Patient presents with    Weakness - Generalized     Patient presents from [facility], reported to have increased fatigue, generalized weakness, shaky. Dipped urine (+) for leukocytes, ketones, protein. Suspected UTI.      89-year-old presents with fevers some confusion and generalized weakness the staff is worried about a UTI.  No focal weakness numbness tingling she has mild dementia so answers questions sporadically.  No reports of nausea vomiting abdominal pain diarrhea rashes shortness of breath cough congestion or any other symptoms      History provided by:  Patient   used: No        Prior to Admission Medications   Prescriptions Last Dose Informant Patient Reported? Taking?   Melatonin 3-10 MG TABS  Care Giver Yes No   Sig: Take 3 mg by mouth   Menthol-Zinc Oxide (REMEDY CALAZIME EX)   Yes No   Sig: Apply topically   Patient not taking: Reported on 8/15/2022   PARoxetine (PAXIL) 30 mg tablet  Care Giver Yes No   Sig: Take 40 mg by mouth every morning     acetaminophen (TYLENOL) 500 mg tablet  Self Yes No   Sig: Take 1,000 mg by mouth every 6 (six) hours as needed for mild pain    acetaminophen-codeine (TYLENOL #3) 300-30 mg per tablet   Yes No   Sig: Take 1 tablet by mouth every 6 (six) hours as needed for moderate pain   amLODIPine (NORVASC) 5 mg tablet   Yes No   Sig: Take 5 mg by mouth daily.   Patient not taking: Reported on 8/15/2022   cycloSPORINE (RESTASIS) 0.05 % ophthalmic emulsion   Yes No   Sig: Administer 1 drop to both eyes 2 (two) times a day.   glipiZIDE (GLUCOTROL) 10 mg tablet  Care Giver Yes No   Sig: Take 5 mg by mouth daily    Patient not taking: Reported on 8/15/2022   hydrOXYzine HCL (ATARAX) 25 mg tablet   Yes No   Sig: Take 25 mg by mouth daily at bedtime   insulin glargine (LANTUS SOLOSTAR) 100 units/mL injection pen  Care Giver Yes No   Sig: Inject 15 Units under the skin daily   losartan (COZAAR) 50 mg tablet   Yes No    Sig: Take 50 mg by mouth daily.   memantine (NAMENDA) 5 mg tablet   Yes No   Sig: Take 5 mg by mouth daily   metFORMIN (GLUCOPHAGE) 500 mg tablet  Care Giver Yes No   Sig: Take 500 mg by mouth 2 (two) times a day with meals   mirtazapine (Remeron) 15 mg tablet   Yes No   Sig: Take 15 mg by mouth daily at bedtime   multivitamin (THERAGRAN) TABS   Yes No   Sig: Take 1 tablet by mouth daily.   nystatin (MYCOSTATIN) cream   Yes No   Sig: Apply 1 application topically 2 (two) times a day Under both breasts   pantoprazole (PROTONIX) 40 mg tablet   Yes No   Sig: Take 40 mg by mouth daily.   simvastatin (ZOCOR) 10 mg tablet   Yes No   Sig: Take 10 mg by mouth daily at bedtime.   sitaGLIPtin (JANUVIA) 100 mg tablet   No No   Sig: Take 1 tablet (100 mg total) by mouth daily      Facility-Administered Medications: None       Past Medical History:   Diagnosis Date    Depression     Diabetes mellitus (HCC)     Gait difficulty     Hyperlipidemia     Hypertension     Incontinence of urine     Macula lutea degeneration     Osteoarthritis        Past Surgical History:   Procedure Laterality Date    APPENDECTOMY      HYSTERECTOMY      ORIF SHOULDER DISLOCATION W/ HUMERAL FRACTURE         Family History   Problem Relation Age of Onset    Heart disease Mother     No Known Problems Father     No Known Problems Sister     No Known Problems Brother     No Known Problems Maternal Aunt     No Known Problems Maternal Uncle     No Known Problems Paternal Aunt     No Known Problems Paternal Uncle     No Known Problems Maternal Grandmother     No Known Problems Maternal Grandfather     No Known Problems Paternal Grandmother     No Known Problems Paternal Grandfather     ADD / ADHD Neg Hx     Anesthesia problems Neg Hx     Cancer Neg Hx     Clotting disorder Neg Hx     Collagen disease Neg Hx     Diabetes Neg Hx     Dislocations Neg Hx     Learning disabilities Neg Hx     Neurological problems Neg Hx     Osteoporosis Neg Hx     Rheumatologic  disease Neg Hx     Scoliosis Neg Hx     Vascular Disease Neg Hx      I have reviewed and agree with the history as documented.    E-Cigarette/Vaping     E-Cigarette/Vaping Substances     Social History     Tobacco Use    Smoking status: Never    Smokeless tobacco: Never   Substance Use Topics    Alcohol use: Not Currently    Drug use: Not Currently       Review of Systems   Constitutional:  Positive for fever.   HENT: Negative.     Eyes: Negative.    Respiratory: Negative.     Cardiovascular: Negative.    Gastrointestinal: Negative.    Endocrine: Negative.    Genitourinary: Negative.    Musculoskeletal: Negative.    Skin: Negative.    Allergic/Immunologic: Negative.    Neurological:  Positive for weakness.   Hematological: Negative.    Psychiatric/Behavioral: Negative.     All other systems reviewed and are negative.      Physical Exam  Physical Exam  Vitals and nursing note reviewed.   Constitutional:       Appearance: Normal appearance.   HENT:      Head: Normocephalic and atraumatic.      Nose: Nose normal.      Mouth/Throat:      Mouth: Mucous membranes are moist.   Eyes:      Extraocular Movements: Extraocular movements intact.      Pupils: Pupils are equal, round, and reactive to light.   Cardiovascular:      Rate and Rhythm: Normal rate and regular rhythm.   Pulmonary:      Effort: Pulmonary effort is normal.      Breath sounds: Normal breath sounds.   Abdominal:      General: Abdomen is flat. Bowel sounds are normal.      Palpations: Abdomen is soft.   Musculoskeletal:         General: Normal range of motion.      Cervical back: Normal range of motion and neck supple.   Skin:     General: Skin is warm.      Capillary Refill: Capillary refill takes less than 2 seconds.   Neurological:      General: No focal deficit present.      Mental Status: She is alert and oriented to person, place, and time. Mental status is at baseline.   Psychiatric:         Mood and Affect: Mood normal.         Thought Content:  Thought content normal.         Vital Signs  ED Triage Vitals [03/16/24 1844]   Temperature Pulse Respirations Blood Pressure SpO2   100.5 °F (38.1 °C) 102 19 120/90 95 %      Temp Source Heart Rate Source Patient Position - Orthostatic VS BP Location FiO2 (%)   Oral Monitor Lying Right arm --      Pain Score       No Pain           Vitals:    03/16/24 1844 03/16/24 2000 03/16/24 2030   BP: 120/90 142/74 138/64   Pulse: 102 96 94   Patient Position - Orthostatic VS: Lying Lying Lying         Visual Acuity  Visual Acuity      Flowsheet Row Most Recent Value   L Pupil Size (mm) 2   R Pupil Size (mm) 2            ED Medications  Medications   sodium chloride 0.9 % bolus 1,000 mL (0 mL Intravenous Stopped 3/16/24 2118)   magnesium sulfate 2 g/50 mL IVPB (premix) 2 g (0 g Intravenous Stopped 3/16/24 2118)   cefTRIAXone (ROCEPHIN) IVPB (premix in dextrose) 1,000 mg 50 mL (0 mg Intravenous Stopped 3/16/24 2032)       Diagnostic Studies  Results Reviewed       Procedure Component Value Units Date/Time    FLU/RSV/COVID - if FLU/RSV clinically relevant [[id number]]  (Abnormal) Collected: 03/16/24 1929    Lab Status: Final result Specimen: Nares from Nose Updated: 03/16/24 2017     SARS-CoV-2 Positive     INFLUENZA A PCR Negative     INFLUENZA B PCR Negative     RSV PCR Negative    Narrative:      FOR PEDIATRIC PATIENTS - copy/paste COVID Guidelines URL to browser: [email or web address]    SARS-CoV-2 assay is a Nucleic Acid Amplification assay intended for the  qualitative detection of nucleic acid from SARS-CoV-2 in nasopharyngeal  swabs. Results are for the presumptive identification of SARS-CoV-2 RNA.    Positive results are indicative of infection with SARS-CoV-2, the virus  causing COVID-19, but do not rule out bacterial infection or co-infection  with other viruses. Laboratories within the [place] and its  territories are required to report all positive results to  the appropriate  public health authorities. Negative results do not preclude SARS-CoV-2  infection and should not be used as the sole basis for treatment or other  patient management decisions. Negative results must be combined with  clinical observations, patient history, and epidemiological information.  This test has not been FDA cleared or approved.    This test has been authorized by FDA under an Emergency Use Authorization  (EUA). This test is only authorized for the duration of time the  declaration that circumstances exist justifying the authorization of the  emergency use of an in vitro diagnostic tests for detection of SARS-CoV-2  virus and/or diagnosis of COVID-19 infection under section 564(b)(1) of  the Act, 21 U.S.C. 360bbb-3(b)(1), unless the authorization is terminated  or revoked sooner. The test has been validated but independent review by FDA  and CLIA is pending.    Test performed using Cepheid GeneXpert: This RT-PCR assay targets N2,  a region unique to SARS-CoV-2. A conserved region in the E-gene was chosen  for pan-Sarbecovirus detection which includes SARS-CoV-2.    According to CMS-2020-01-R, this platform meets the definition of high-throughput technology.    Urine Microscopic [[id number]]  (Abnormal) Collected: 03/16/24 1943    Lab Status: Final result Specimen: Urine, Straight Cath Updated: 03/16/24 2016     RBC, UA 1-2 /hpf      WBC, UA Innumerable /hpf      Epithelial Cells Occasional /hpf      Bacteria, UA Innumerable /hpf      OTHER OBSERVATIONS Renal Tubule Epithelial Cells Present    Lactic acid, plasma (w/reflex if result > 2.0) [[id number]]  (Normal) Collected: 03/16/24 1929    Lab Status: Final result Specimen: Blood from Arm, Right Updated: 03/16/24 1957     LACTIC ACID 1.7 mmol/L     Narrative:      Result may be elevated if tourniquet was used during collection.    Comprehensive metabolic panel [[id number]]  (Abnormal) Collected: 03/16/24 1929    Lab Status: Final result  Specimen: Blood from Arm, Right Updated: 03/16/24 1956     Sodium 131 mmol/L      Potassium 4.2 mmol/L      Chloride 99 mmol/L      CO2 23 mmol/L      ANION GAP 9 mmol/L      BUN 23 mg/dL      Creatinine 1.11 mg/dL      Glucose 172 mg/dL      Calcium 9.4 mg/dL      AST 14 U/L      ALT 8 U/L      Alkaline Phosphatase 51 U/L      Total Protein 6.7 g/dL      Albumin 3.8 g/dL      Total Bilirubin 0.43 mg/dL      eGFR 42 ml/min/1.73sq m     Narrative:      National Kidney Disease Foundation guidelines for Chronic Kidney Disease (CKD):     Stage 1 with normal or high GFR (GFR > 90 mL/min/1.73 square meters)    Stage 2 Mild CKD (GFR = 60-89 mL/min/1.73 square meters)    Stage 3A Moderate CKD (GFR = 45-59 mL/min/1.73 square meters)    Stage 3B Moderate CKD (GFR = 30-44 mL/min/1.73 square meters)    Stage 4 Severe CKD (GFR = 15-29 mL/min/1.73 square meters)    Stage 5 End Stage CKD (GFR <15 mL/min/1.73 square meters)  Note: GFR calculation is accurate only with a steady state creatinine    Magnesium [[id number]]  (Abnormal) Collected: 03/16/24 1929    Lab Status: Final result Specimen: Blood from Arm, Right Updated: 03/16/24 1956     Magnesium 1.3 mg/dL     UA (URINE) with reflex to Scope [[id number]]  (Abnormal) Collected: 03/16/24 1943    Lab Status: Final result Specimen: Urine, Straight Cath Updated: 03/16/24 1955     Color, UA Orange     Clarity, UA Cloudy     Specific Gravity, UA 1.025     pH, UA 6.0     Leukocytes, UA Large     Nitrite, UA Negative     Protein, UA 30 (1+) mg/dl      Glucose, UA Negative mg/dl      Ketones, UA Trace mg/dl      Urobilinogen, UA 0.2 E.U./dl      Bilirubin, UA Small     Occult Blood, UA Trace-Intact    Urine culture [[id number]] Collected: 03/16/24 1943    Lab Status: In process Specimen: Urine, Other Updated: 03/16/24 1949    CBC and differential [[id number]]  (Abnormal) Collected: 03/16/24 1929    Lab Status: Final result Specimen: Blood from Arm, Right Updated: 03/16/24 1940     WBC 9.60  Thousand/uL      RBC 3.67 Million/uL      Hemoglobin 11.1 g/dL      Hematocrit 34.4 %      MCV 94 fL      MCH 30.2 pg      MCHC 32.3 g/dL      RDW 13.0 %      MPV 10.2 fL      Platelets 261 Thousands/uL      nRBC 0 /100 WBCs      Neutrophils Relative 86 %      Immature Grans % 0 %      Lymphocytes Relative 7 %      Monocytes Relative 7 %      Eosinophils Relative 0 %      Basophils Relative 0 %      Neutrophils Absolute 8.20 Thousands/µL      Absolute Immature Grans 0.03 Thousand/uL      Absolute Lymphocytes 0.71 Thousands/µL      Absolute Monocytes 0.64 Thousand/µL      Eosinophils Absolute 0.00 Thousand/µL      Basophils Absolute 0.02 Thousands/µL     Blood culture #2 [[id number]] Collected: 03/16/24 1929    Lab Status: In process Specimen: Blood from Arm, Right Updated: 03/16/24 1938    Blood culture #1 [[id number]] Collected: 03/16/24 1932    Lab Status: In process Specimen: Blood from Arm, Right Updated: 03/16/24 1938                   XR chest 1 view portable    (Results Pending)              Procedures  ECG 12 Lead Documentation Only    Date/Time: 3/17/2024 12:38 AM    Performed by: Vandana Basilio DO  Authorized by: Vandana Basilio DO    ECG reviewed by me, the ED Provider: yes    Patient location:  ED  Previous ECG:     Previous ECG:  Unavailable    Comparison to cardiac monitor: Yes    Interpretation:     Interpretation: non-specific    Rate:     ECG rate assessment: normal    Rhythm:     Rhythm: sinus rhythm    Ectopy:     Ectopy: none    QRS:     QRS axis:  Normal  Conduction:     Conduction: normal    ST segments:     ST segments:  Non-specific  T waves:     T waves: non-specific             ED Course                               SBIRT 20yo+      Flowsheet Row Most Recent Value   Initial Alcohol Screen: US AUDIT-C     1. How often do you have a drink containing alcohol? 0 Filed at: 03/16/2024 1849   2. How many drinks containing alcohol do you have on a typical day you are drinking?  0 Filed at: 03/16/2024  1849   3a. Male UNDER 65: How often do you have five or more drinks on one occasion? 0 Filed at: 03/16/2024 1849   3b. FEMALE Any Age, or MALE 65+: How often do you have 4 or more drinks on one occassion? 0 Filed at: 03/16/2024 1849   Audit-C Score 0 Filed at: 03/16/2024 1849   KRISTA: How many times in the past year have you...    Used an illegal drug or used a prescription medication for non-medical reasons? Never Filed at: 03/16/2024 1849                      Medical Decision Making  Patient evaluated in the ED with EKG labs imaging given IV antibiotics diagnosed with a urinary tract infection discharged back to her nursing home.  She was also COVID-positive.    Problems Addressed:  Hypomagnesemia: acute illness or injury  UTI (urinary tract infection): acute illness or injury  Weakness: acute illness or injury    Amount and/or Complexity of Data Reviewed  External Data Reviewed: notes.  Labs: ordered. Decision-making details documented in ED Course.  Radiology: ordered. Decision-making details documented in ED Course.  ECG/medicine tests: ordered and independent interpretation performed. Decision-making details documented in ED Course.    Risk  Prescription drug management.             Disposition  Final diagnoses:   Weakness   UTI (urinary tract infection)   Hypomagnesemia   COVID-19     Time reflects when diagnosis was documented in both MDM as applicable and the Disposition within this note       Time User Action Codes Description Comment    3/16/2024  8:10 PM Anepu, Vandana Add [R53.1] Weakness     3/16/2024  8:10 PM Anepu, Vandana Add [N39.0] UTI (urinary tract infection)     3/16/2024  8:11 PM Anepu, Vandana Add [E83.42] Hypomagnesemia     3/17/2024 12:39 AM Anepu, Vandana Add [U07.1] COVID-19           ED Disposition       ED Disposition   Discharge    Condition   Stable    Date/Time   Sat Mar 16, 2024 2010    Comment   Sabrina Barclay discharge to home/self care.                   Follow-up Information        Follow up With Specialties Details Why Contact Info Additional Information    [facility] Emergency Department Emergency Medicine  If symptoms worsen [address] [facility] [id number] [phone] [facility] Emergency Department, [address], [place], [address]            Discharge Medication List as of 3/16/2024  8:11 PM        START taking these medications    Details   cefadroxil (DURICEF) 500 mg capsule Take 1 capsule (500 mg total) by mouth every 12 (twelve) hours for 7 days, Starting Sat 3/16/2024, Until Sat 3/23/2024, Normal           CONTINUE these medications which have NOT CHANGED    Details   acetaminophen (TYLENOL) 500 mg tablet Take 1,000 mg by mouth every 6 (six) hours as needed for mild pain , Historical Med      acetaminophen-codeine (TYLENOL #3) 300-30 mg per tablet Take 1 tablet by mouth every 6 (six) hours as needed for moderate pain, Historical Med      amLODIPine (NORVASC) 5 mg tablet Take 5 mg by mouth daily., Until Discontinued, Historical Med      cycloSPORINE (RESTASIS) 0.05 % ophthalmic emulsion Administer 1 drop to both eyes 2 (two) times a day., Until Discontinued, Historical Med      glipiZIDE (GLUCOTROL) 10 mg tablet Take 5 mg by mouth daily , Historical Med      hydrOXYzine HCL (ATARAX) 25 mg tablet Take 25 mg by mouth daily at bedtime, Historical Med      insulin glargine (LANTUS SOLOSTAR) 100 units/mL injection pen Inject 15 Units under the skin daily, Historical Med      losartan (COZAAR) 50 mg tablet Take 50 mg by mouth daily., Until Discontinued, Historical Med      Melatonin 3-10 MG TABS Take 3 mg by mouth, Historical Med      memantine (NAMENDA) 5 mg tablet Take 5 mg by mouth daily, Historical Med      Menthol-Zinc Oxide (REMEDY CALAZIME EX) Apply topically, Historical Med      metFORMIN (GLUCOPHAGE) 500 mg tablet Take 500 mg by mouth 2 (two) times a day with meals, Historical Med      mirtazapine (Remeron) 15 mg  tablet Take 15 mg by mouth daily at bedtime, Historical Med      multivitamin (THERAGRAN) TABS Take 1 tablet by mouth daily., Until Discontinued, Historical Med      nystatin (MYCOSTATIN) cream Apply 1 application topically 2 (two) times a day Under both breasts, Historical Med      pantoprazole (PROTONIX) 40 mg tablet Take 40 mg by mouth daily., Until Discontinued, Historical Med      PARoxetine (PAXIL) 30 mg tablet Take 40 mg by mouth every morning  , Historical Med      simvastatin (ZOCOR) 10 mg tablet Take 10 mg by mouth daily at bedtime., Until Discontinued, Historical Med      sitaGLIPtin (JANUVIA) 100 mg tablet Take 1 tablet (100 mg total) by mouth daily, Starting u 5/23/2019, No Print             No discharge procedures on file.    PDMP Review       None            ED Provider  Electronically Signed by             Vandana Basilio DO  03/17/24 0039

## 2024-03-17 NOTE — ED NOTES
Rere at the nursing home called and notified that the patient will be coming back to [place], aware of UTI and covid.      Jessica Mckee RN  03/16/24 2034

## 2024-03-18 LAB
ATRIAL RATE: 97 BPM
P AXIS: -24 DEGREES
PR INTERVAL: 150 MS
QRS AXIS: 19 DEGREES
QRSD INTERVAL: 78 MS
QT INTERVAL: 346 MS
QTC INTERVAL: 439 MS
T WAVE AXIS: 117 DEGREES
VENTRICULAR RATE: 97 BPM

## 2024-03-18 PROCEDURE — 93010 ELECTROCARDIOGRAM REPORT: CPT | Performed by: INTERNAL MEDICINE

## 2024-03-19 LAB
BACTERIA UR CULT: ABNORMAL
BACTERIA UR CULT: ABNORMAL

## 2024-03-22 LAB
BACTERIA BLD CULT: NORMAL
BACTERIA BLD CULT: NORMAL

## 2024-03-26 ENCOUNTER — APPOINTMENT (EMERGENCY)
Dept: RADIOLOGY | Facility: HOSPITAL | Age: 89
End: 2024-03-26
Payer: MEDICARE

## 2024-03-26 ENCOUNTER — HOSPITAL ENCOUNTER (EMERGENCY)
Facility: HOSPITAL | Age: 89
Discharge: NURSING, REHAB OR LONG-TERM CARE | End: 2024-03-26
Attending: EMERGENCY MEDICINE | Admitting: EMERGENCY MEDICINE
Payer: MEDICARE

## 2024-03-26 VITALS
RESPIRATION RATE: 20 BRPM | OXYGEN SATURATION: 95 % | SYSTOLIC BLOOD PRESSURE: 185 MMHG | TEMPERATURE: 98 F | BODY MASS INDEX: 25.75 KG/M2 | WEIGHT: 150 LBS | DIASTOLIC BLOOD PRESSURE: 77 MMHG | HEART RATE: 71 BPM
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
## 2024-03-26 DIAGNOSIS — S09.90XA INJURY OF HEAD, INITIAL ENCOUNTER: ICD-10-CM

## 2024-03-26 PROCEDURE — 70450 CT HEAD/BRAIN W/O DYE: CPT

## 2024-03-26 PROCEDURE — 99284 EMERGENCY DEPT VISIT MOD MDM: CPT

## 2024-03-26 PROCEDURE — 72131 CT LUMBAR SPINE W/O DYE: CPT

## 2024-03-26 PROCEDURE — 72125 CT NECK SPINE W/O DYE: CPT

## 2024-03-26 RX ORDER — ACETAMINOPHEN 325 MG/1
650 TABLET ORAL ONCE
Status: COMPLETED | OUTPATIENT
Start: 2024-03-26 | End: 2024-03-26

## 2024-03-26 RX ADMIN — ACETAMINOPHEN 650 MG: 325 TABLET ORAL at 17:37

## 2024-03-29 NOTE — ED PROVIDER NOTES
History  Chief Complaint   Patient presents with    Fall     Fell getting out of bed and  hit her head. Denies LOC. C/o head and back pain     Patient presents for evaluation of a fall after getting out of bed striking her head.  Denies any loss of consciousness.  Complaining of head and back pain.      History provided by:  Patient, medical records and EMS personnel   used: No    Fall  Associated symptoms: back pain        Prior to Admission Medications   Prescriptions Last Dose Informant Patient Reported? Taking?   Melatonin 3-10 MG TABS  Care Giver Yes No   Sig: Take 3 mg by mouth   Menthol-Zinc Oxide (REMEDY CALAZIME EX)   Yes No   Sig: Apply topically   Patient not taking: Reported on 8/15/2022   PARoxetine (PAXIL) 30 mg tablet  Care Giver Yes No   Sig: Take 40 mg by mouth every morning     acetaminophen (TYLENOL) 500 mg tablet  Self Yes No   Sig: Take 1,000 mg by mouth every 6 (six) hours as needed for mild pain    acetaminophen-codeine (TYLENOL #3) 300-30 mg per tablet   Yes No   Sig: Take 1 tablet by mouth every 6 (six) hours as needed for moderate pain   amLODIPine (NORVASC) 5 mg tablet   Yes No   Sig: Take 5 mg by mouth daily.   Patient not taking: Reported on 8/15/2022   cycloSPORINE (RESTASIS) 0.05 % ophthalmic emulsion   Yes No   Sig: Administer 1 drop to both eyes 2 (two) times a day.   glipiZIDE (GLUCOTROL) 10 mg tablet  Care Giver Yes No   Sig: Take 5 mg by mouth daily    Patient not taking: Reported on 8/15/2022   hydrOXYzine HCL (ATARAX) 25 mg tablet   Yes No   Sig: Take 25 mg by mouth daily at bedtime   insulin glargine (LANTUS SOLOSTAR) 100 units/mL injection pen  Care Giver Yes No   Sig: Inject 15 Units under the skin daily   losartan (COZAAR) 50 mg tablet   Yes No   Sig: Take 50 mg by mouth daily.   memantine (NAMENDA) 5 mg tablet   Yes No   Sig: Take 5 mg by mouth daily   metFORMIN (GLUCOPHAGE) 500 mg tablet  Care Giver Yes No   Sig: Take 500 mg by mouth 2 (two) times a day  with meals   mirtazapine (Remeron) 15 mg tablet   Yes No   Sig: Take 15 mg by mouth daily at bedtime   multivitamin (THERAGRAN) TABS   Yes No   Sig: Take 1 tablet by mouth daily.   nystatin (MYCOSTATIN) cream   Yes No   Sig: Apply 1 application topically 2 (two) times a day Under both breasts   pantoprazole (PROTONIX) 40 mg tablet   Yes No   Sig: Take 40 mg by mouth daily.   simvastatin (ZOCOR) 10 mg tablet   Yes No   Sig: Take 10 mg by mouth daily at bedtime.   sitaGLIPtin (JANUVIA) 100 mg tablet   No No   Sig: Take 1 tablet (100 mg total) by mouth daily      Facility-Administered Medications: None       Past Medical History:   Diagnosis Date    Depression     Diabetes mellitus (HCC)     Gait difficulty     Hyperlipidemia     Hypertension     Incontinence of urine     Macula lutea degeneration     Osteoarthritis        Past Surgical History:   Procedure Laterality Date    APPENDECTOMY      HYSTERECTOMY      ORIF SHOULDER DISLOCATION W/ HUMERAL FRACTURE         Family History   Problem Relation Age of Onset    Heart disease Mother     No Known Problems Father     No Known Problems Sister     No Known Problems Brother     No Known Problems Maternal Aunt     No Known Problems Maternal Uncle     No Known Problems Paternal Aunt     No Known Problems Paternal Uncle     No Known Problems Maternal Grandmother     No Known Problems Maternal Grandfather     No Known Problems Paternal Grandmother     No Known Problems Paternal Grandfather     ADD / ADHD Neg Hx     Anesthesia problems Neg Hx     Cancer Neg Hx     Clotting disorder Neg Hx     Collagen disease Neg Hx     Diabetes Neg Hx     Dislocations Neg Hx     Learning disabilities Neg Hx     Neurological problems Neg Hx     Osteoporosis Neg Hx     Rheumatologic disease Neg Hx     Scoliosis Neg Hx     Vascular Disease Neg Hx      I have reviewed and agree with the history as documented.    E-Cigarette/Vaping     E-Cigarette/Vaping Substances    Nicotine No     THC No     CBD  No     Flavoring No     Other No     Unknown No      Social History     Tobacco Use    Smoking status: Never    Smokeless tobacco: Never   Substance Use Topics    Alcohol use: Not Currently    Drug use: Not Currently       Review of Systems   Musculoskeletal:  Positive for back pain.   All other systems reviewed and are negative.      Physical Exam  Physical Exam  Vitals and nursing note reviewed.   Constitutional:       General: She is not in acute distress.  Eyes:      General: No scleral icterus.     Extraocular Movements: Extraocular movements intact.      Conjunctiva/sclera: Conjunctivae normal.      Pupils: Pupils are equal, round, and reactive to light.   Cardiovascular:      Rate and Rhythm: Normal rate and regular rhythm.   Pulmonary:      Effort: Pulmonary effort is normal. No respiratory distress.      Breath sounds: Normal breath sounds.   Musculoskeletal:         General: Tenderness present. Normal range of motion.        Back:    Neurological:      General: No focal deficit present.      Mental Status: She is alert. Mental status is at baseline.      Cranial Nerves: No cranial nerve deficit.      Sensory: No sensory deficit.      Motor: No weakness.         Vital Signs  ED Triage Vitals   Temperature Pulse Respirations Blood Pressure SpO2   03/26/24 1731 03/26/24 1731 03/26/24 1731 03/26/24 1731 03/26/24 1731   98 °F (36.7 °C) 76 18 (!) 195/95 98 %      Temp src Heart Rate Source Patient Position - Orthostatic VS BP Location FiO2 (%)   -- 03/26/24 2112 03/26/24 2112 03/26/24 2112 --    Monitor Lying Right arm       Pain Score       03/26/24 1731       4           Vitals:    03/26/24 1731 03/26/24 2112   BP: (!) 195/95 (!) 185/77   Pulse: 76 71   Patient Position - Orthostatic VS:  Lying         Visual Acuity      ED Medications  Medications   acetaminophen (TYLENOL) tablet 650 mg (650 mg Oral Given 3/26/24 1737)       Diagnostic Studies  Results Reviewed       None                   CT head without  contrast   Final Result by Hugo Cid MD (03/26 1918)      No acute intracranial hemorrhage or depressed calvarial fracture identified.                  Workstation performed: [id number]         CT cervical spine without contrast   Final Result by Hugo Cid MD (03/26 1925)      Diffuse osteopenia. No acute fracture or evidence for traumatic malalignment.                  Workstation performed: [id number]         CT lumbar spine without contrast   Final Result by Hugo Cid MD (03/26 1947)      Diffuse osteopenia without definite evidence for acute fracture.      Moderate to severe chronic loss of height of the T9 and L2 vertebral bodies is noted with bony bridging across ventral osteophytosis at these levels.      Degenerative changes as described above.         Workstation performed: [id number]                    Procedures  Procedures         ED Course                               SBIRT 22yo+      Flowsheet Row Most Recent Value   Initial Alcohol Screen: US AUDIT-C     1. How often do you have a drink containing alcohol? 0 Filed at: 03/26/2024 1734   2. How many drinks containing alcohol do you have on a typical day you are drinking?  0 Filed at: 03/26/2024 1734   3a. Male UNDER 65: How often do you have five or more drinks on one occasion? 0 Filed at: 03/26/2024 1734   3b. FEMALE Any Age, or MALE 65+: How often do you have 4 or more drinks on one occassion? 0 Filed at: 03/26/2024 1734   Audit-C Score 0 Filed at: 03/26/2024 1734                      Medical Decision Making  Pulse ox 95% on room air indicating adequate oxygenation.    Amount and/or Complexity of Data Reviewed  Radiology: ordered.    Risk  OTC drugs.             Disposition  Final diagnoses:   Fall, initial encounter   Injury of head, initial encounter     Time reflects when diagnosis was documented in both MDM as applicable and the Disposition within this note       Time User Action Codes Description Comment    3/26/2024  7:58 PM Yvan Alonso Add  [W19.XXXA] Fall, initial encounter     3/26/2024  7:58 PM Yvan Alonso Add [S09.90XA] Injury of head, initial encounter           ED Disposition       ED Disposition   Discharge    Condition   Stable    Date/Time   Tue Mar 26, 2024 1958    Comment   Sabrina Barclay discharge to home/self care.                   Follow-up Information       Follow up With Specialties Details Why Contact Info Additional Information    [facility] Emergency Medicine  If symptoms worsen [address] [facility] [address]  [phone] [facility], [address], [place], [address]    [employer]  In 1 week [phone]               Discharge Medication List as of 3/26/2024  7:58 PM        CONTINUE these medications which have NOT CHANGED    Details   acetaminophen (TYLENOL) 500 mg tablet Take 1,000 mg by mouth every 6 (six) hours as needed for mild pain , Historical Med      acetaminophen-codeine (TYLENOL #3) 300-30 mg per tablet Take 1 tablet by mouth every 6 (six) hours as needed for moderate pain, Historical Med      amLODIPine (NORVASC) 5 mg tablet Take 5 mg by mouth daily., Until Discontinued, Historical Med      cycloSPORINE (RESTASIS) 0.05 % ophthalmic emulsion Administer 1 drop to both eyes 2 (two) times a day., Until Discontinued, Historical Med      glipiZIDE (GLUCOTROL) 10 mg tablet Take 5 mg by mouth daily , Historical Med      hydrOXYzine HCL (ATARAX) 25 mg tablet Take 25 mg by mouth daily at bedtime, Historical Med      insulin glargine (LANTUS SOLOSTAR) 100 units/mL injection pen Inject 15 Units under the skin daily, Historical Med      losartan (COZAAR) 50 mg tablet Take 50 mg by mouth daily., Until Discontinued, Historical Med      Melatonin 3-10 MG TABS Take 3 mg by mouth, Historical Med      memantine (NAMENDA) 5 mg tablet Take 5 mg by mouth daily, Historical Med      Menthol-Zinc Oxide (REMEDY CALAZIME EX) Apply  topically, Historical Med      metFORMIN (GLUCOPHAGE) 500 mg tablet Take 500 mg by mouth 2 (two) times a day with meals, Historical Med      mirtazapine (Remeron) 15 mg tablet Take 15 mg by mouth daily at bedtime, Historical Med      multivitamin (THERAGRAN) TABS Take 1 tablet by mouth daily., Until Discontinued, Historical Med      nystatin (MYCOSTATIN) cream Apply 1 application topically 2 (two) times a day Under both breasts, Historical Med      pantoprazole (PROTONIX) 40 mg tablet Take 40 mg by mouth daily., Until Discontinued, Historical Med      PARoxetine (PAXIL) 30 mg tablet Take 40 mg by mouth every morning  , Historical Med      simvastatin (ZOCOR) 10 mg tablet Take 10 mg by mouth daily at bedtime., Until Discontinued, Historical Med      sitaGLIPtin (JANUVIA) 100 mg tablet Take 1 tablet (100 mg total) by mouth daily, Starting Thu 5/23/2019, No Print             No discharge procedures on file.    PDMP Review       None            ED Provider  Electronically Signed by             Yvan Alonso DO  03/28/24 4250

## 2024-06-09 ENCOUNTER — APPOINTMENT (EMERGENCY)
Dept: RADIOLOGY | Facility: HOSPITAL | Age: 89
DRG: 872 | End: 2024-06-09
Payer: MEDICARE

## 2024-06-09 ENCOUNTER — HOSPITAL ENCOUNTER (INPATIENT)
Facility: HOSPITAL | Age: 89
LOS: 5 days | Discharge: NURSING, REHAB OR LONG-TERM CARE | DRG: 872 | End: 2024-06-14
Attending: EMERGENCY MEDICINE | Admitting: STUDENT IN AN ORGANIZED HEALTH CARE EDUCATION/TRAINING PROGRAM
Payer: MEDICARE

## 2024-06-09 DIAGNOSIS — S21.009A BREAST WOUND: Primary | ICD-10-CM

## 2024-06-09 DIAGNOSIS — A41.9 SEPSIS (HCC): ICD-10-CM

## 2024-06-09 DIAGNOSIS — R50.9 FEVER: ICD-10-CM

## 2024-06-09 DIAGNOSIS — N39.0 UTI (URINARY TRACT INFECTION): ICD-10-CM

## 2024-06-09 PROBLEM — S21.001A WOUND OF RIGHT BREAST: Status: ACTIVE | Noted: 2024-06-09

## 2024-06-09 PROBLEM — R65.20 SEVERE SEPSIS (HCC): Status: ACTIVE | Noted: 2024-06-09

## 2024-06-09 PROBLEM — E11.9 TYPE 2 DIABETES MELLITUS (HCC): Status: ACTIVE | Noted: 2019-05-21

## 2024-06-09 LAB
ALBUMIN SERPL BCP-MCNC: 3.8 G/DL (ref 3.5–5)
ALP SERPL-CCNC: 75 U/L (ref 34–104)
ALT SERPL W P-5'-P-CCNC: 9 U/L (ref 7–52)
ANION GAP SERPL CALCULATED.3IONS-SCNC: 14 MMOL/L (ref 4–13)
APTT PPP: 21 SECONDS (ref 23–37)
AST SERPL W P-5'-P-CCNC: 14 U/L (ref 13–39)
BACTERIA UR QL AUTO: ABNORMAL /HPF
BASOPHILS # BLD MANUAL: 0 THOUSAND/UL (ref 0–0.1)
BASOPHILS NFR MAR MANUAL: 0 % (ref 0–1)
BILIRUB SERPL-MCNC: 0.33 MG/DL (ref 0.2–1)
BILIRUB UR QL STRIP: NEGATIVE
BUN SERPL-MCNC: 18 MG/DL (ref 5–25)
CALCIUM SERPL-MCNC: 9.2 MG/DL (ref 8.4–10.2)
CHLORIDE SERPL-SCNC: 103 MMOL/L (ref 96–108)
CLARITY UR: ABNORMAL
CO2 SERPL-SCNC: 20 MMOL/L (ref 21–32)
COLOR UR: ABNORMAL
CREAT SERPL-MCNC: 1.02 MG/DL (ref 0.6–1.3)
EOSINOPHIL # BLD MANUAL: 0 THOUSAND/UL (ref 0–0.4)
EOSINOPHIL NFR BLD MANUAL: 0 % (ref 0–6)
ERYTHROCYTE [DISTWIDTH] IN BLOOD BY AUTOMATED COUNT: 13.4 % (ref 11.6–15.1)
FLUAV RNA RESP QL NAA+PROBE: NEGATIVE
FLUBV RNA RESP QL NAA+PROBE: NEGATIVE
GFR SERPL CREATININE-BSD FRML MDRD: 47 ML/MIN/1.73SQ M
GLUCOSE SERPL-MCNC: 208 MG/DL (ref 65–140)
GLUCOSE UR STRIP-MCNC: ABNORMAL MG/DL
HCT VFR BLD AUTO: 36.4 % (ref 34.8–46.1)
HGB BLD-MCNC: 11.7 G/DL (ref 11.5–15.4)
HGB UR QL STRIP.AUTO: ABNORMAL
INR PPP: 1.03 (ref 0.84–1.19)
KETONES UR STRIP-MCNC: NEGATIVE MG/DL
LACTATE SERPL-SCNC: 2.9 MMOL/L (ref 0.5–2)
LACTATE SERPL-SCNC: 3.9 MMOL/L (ref 0.5–2)
LEUKOCYTE ESTERASE UR QL STRIP: ABNORMAL
LG PLATELETS BLD QL SMEAR: PRESENT
LYMPHOCYTES # BLD AUTO: 0.52 THOUSAND/UL (ref 0.6–4.47)
LYMPHOCYTES # BLD AUTO: 4 % (ref 14–44)
MCH RBC QN AUTO: 29.8 PG (ref 26.8–34.3)
MCHC RBC AUTO-ENTMCNC: 32.1 G/DL (ref 31.4–37.4)
MCV RBC AUTO: 93 FL (ref 82–98)
MONOCYTES # BLD AUTO: 0.52 THOUSAND/UL (ref 0–1.22)
MONOCYTES NFR BLD: 4 % (ref 4–12)
NEUTROPHILS # BLD MANUAL: 12 THOUSAND/UL (ref 1.85–7.62)
NEUTS BAND NFR BLD MANUAL: 6 % (ref 0–8)
NEUTS SEG NFR BLD AUTO: 86 % (ref 43–75)
NITRITE UR QL STRIP: NEGATIVE
NON-SQ EPI CELLS URNS QL MICRO: ABNORMAL /HPF
PH UR STRIP.AUTO: 7 [PH]
PLATELET # BLD AUTO: 257 THOUSANDS/UL (ref 149–390)
PLATELET BLD QL SMEAR: ADEQUATE
PMV BLD AUTO: 9.7 FL (ref 8.9–12.7)
POTASSIUM SERPL-SCNC: 3.8 MMOL/L (ref 3.5–5.3)
PROCALCITONIN SERPL-MCNC: 0.51 NG/ML
PROT SERPL-MCNC: 6.6 G/DL (ref 6.4–8.4)
PROT UR STRIP-MCNC: ABNORMAL MG/DL
PROTHROMBIN TIME: 13.7 SECONDS (ref 11.6–14.5)
RBC # BLD AUTO: 3.93 MILLION/UL (ref 3.81–5.12)
RBC #/AREA URNS AUTO: ABNORMAL /HPF
RBC MORPH BLD: NORMAL
RSV RNA RESP QL NAA+PROBE: NEGATIVE
SARS-COV-2 RNA RESP QL NAA+PROBE: NEGATIVE
SODIUM SERPL-SCNC: 137 MMOL/L (ref 135–147)
SP GR UR STRIP.AUTO: 1.02 (ref 1–1.03)
UROBILINOGEN UR STRIP-ACNC: <2 MG/DL
WBC # BLD AUTO: 13.04 THOUSAND/UL (ref 4.31–10.16)
WBC #/AREA URNS AUTO: ABNORMAL /HPF

## 2024-06-09 PROCEDURE — 83735 ASSAY OF MAGNESIUM: CPT | Performed by: NURSE PRACTITIONER

## 2024-06-09 PROCEDURE — 87077 CULTURE AEROBIC IDENTIFY: CPT | Performed by: EMERGENCY MEDICINE

## 2024-06-09 PROCEDURE — 87040 BLOOD CULTURE FOR BACTERIA: CPT | Performed by: EMERGENCY MEDICINE

## 2024-06-09 PROCEDURE — 85730 THROMBOPLASTIN TIME PARTIAL: CPT | Performed by: EMERGENCY MEDICINE

## 2024-06-09 PROCEDURE — 87186 SC STD MICRODIL/AGAR DIL: CPT | Performed by: EMERGENCY MEDICINE

## 2024-06-09 PROCEDURE — 85027 COMPLETE CBC AUTOMATED: CPT | Performed by: EMERGENCY MEDICINE

## 2024-06-09 PROCEDURE — 87086 URINE CULTURE/COLONY COUNT: CPT | Performed by: EMERGENCY MEDICINE

## 2024-06-09 PROCEDURE — 99285 EMERGENCY DEPT VISIT HI MDM: CPT | Performed by: EMERGENCY MEDICINE

## 2024-06-09 PROCEDURE — 36415 COLL VENOUS BLD VENIPUNCTURE: CPT | Performed by: EMERGENCY MEDICINE

## 2024-06-09 PROCEDURE — 71260 CT THORAX DX C+: CPT

## 2024-06-09 PROCEDURE — 99222 1ST HOSP IP/OBS MODERATE 55: CPT | Performed by: NURSE PRACTITIONER

## 2024-06-09 PROCEDURE — 74177 CT ABD & PELVIS W/CONTRAST: CPT

## 2024-06-09 PROCEDURE — 87205 SMEAR GRAM STAIN: CPT | Performed by: EMERGENCY MEDICINE

## 2024-06-09 PROCEDURE — 83605 ASSAY OF LACTIC ACID: CPT | Performed by: EMERGENCY MEDICINE

## 2024-06-09 PROCEDURE — 85007 BL SMEAR W/DIFF WBC COUNT: CPT | Performed by: EMERGENCY MEDICINE

## 2024-06-09 PROCEDURE — 93005 ELECTROCARDIOGRAM TRACING: CPT

## 2024-06-09 PROCEDURE — 85610 PROTHROMBIN TIME: CPT | Performed by: EMERGENCY MEDICINE

## 2024-06-09 PROCEDURE — 87070 CULTURE OTHR SPECIMN AEROBIC: CPT | Performed by: EMERGENCY MEDICINE

## 2024-06-09 PROCEDURE — 96365 THER/PROPH/DIAG IV INF INIT: CPT

## 2024-06-09 PROCEDURE — 0241U HB NFCT DS VIR RESP RNA 4 TRGT: CPT | Performed by: EMERGENCY MEDICINE

## 2024-06-09 PROCEDURE — 81001 URINALYSIS AUTO W/SCOPE: CPT | Performed by: EMERGENCY MEDICINE

## 2024-06-09 PROCEDURE — 84145 PROCALCITONIN (PCT): CPT | Performed by: EMERGENCY MEDICINE

## 2024-06-09 PROCEDURE — 96361 HYDRATE IV INFUSION ADD-ON: CPT

## 2024-06-09 PROCEDURE — 99285 EMERGENCY DEPT VISIT HI MDM: CPT

## 2024-06-09 PROCEDURE — 80053 COMPREHEN METABOLIC PANEL: CPT | Performed by: EMERGENCY MEDICINE

## 2024-06-09 PROCEDURE — 83036 HEMOGLOBIN GLYCOSYLATED A1C: CPT | Performed by: NURSE PRACTITIONER

## 2024-06-09 RX ORDER — METRONIDAZOLE 250 MG/1
250 TABLET ORAL
COMMUNITY
End: 2024-06-14

## 2024-06-09 RX ORDER — LOPERAMIDE HYDROCHLORIDE 2 MG/1
2 CAPSULE ORAL DAILY
COMMUNITY

## 2024-06-09 RX ORDER — SACCHAROMYCES BOULARDII 250 MG
250 CAPSULE ORAL 2 TIMES DAILY
COMMUNITY

## 2024-06-09 RX ORDER — FESOTERODINE FUMARATE 8 MG/1
8 TABLET, FILM COATED, EXTENDED RELEASE ORAL DAILY
COMMUNITY

## 2024-06-09 RX ORDER — TRAMADOL HYDROCHLORIDE 50 MG/1
25 TABLET ORAL EVERY 8 HOURS PRN
COMMUNITY
End: 2024-06-14

## 2024-06-09 RX ORDER — DONEPEZIL HYDROCHLORIDE 5 MG/1
5 TABLET, FILM COATED ORAL
COMMUNITY

## 2024-06-09 RX ORDER — INSULIN GLARGINE 100 [IU]/ML
15 INJECTION, SOLUTION SUBCUTANEOUS DAILY
COMMUNITY

## 2024-06-09 RX ORDER — ACETAMINOPHEN 325 MG/1
650 TABLET ORAL 2 TIMES DAILY
COMMUNITY

## 2024-06-09 RX ORDER — OMEGA-3S/DHA/EPA/FISH OIL/D3 300MG-1000
1000 CAPSULE ORAL DAILY
COMMUNITY

## 2024-06-09 RX ADMIN — SODIUM CHLORIDE, SODIUM LACTATE, POTASSIUM CHLORIDE, AND CALCIUM CHLORIDE 1000 ML: .6; .31; .03; .02 INJECTION, SOLUTION INTRAVENOUS at 23:34

## 2024-06-09 RX ADMIN — PIPERACILLIN AND TAZOBACTAM 4.5 G: 4; .5 INJECTION, POWDER, FOR SOLUTION INTRAVENOUS at 20:10

## 2024-06-09 RX ADMIN — SODIUM CHLORIDE 1000 ML: 0.9 INJECTION, SOLUTION INTRAVENOUS at 20:43

## 2024-06-09 RX ADMIN — SODIUM CHLORIDE 500 ML: 0.9 INJECTION, SOLUTION INTRAVENOUS at 20:11

## 2024-06-09 RX ADMIN — IOHEXOL 100 ML: 350 INJECTION, SOLUTION INTRAVENOUS at 20:59

## 2024-06-09 NOTE — ED PROVIDER NOTES
History  Chief Complaint   Patient presents with    Flu Symptoms     As per the Katie; the patient vomited after dinner and then began with chills and temperature of 100.1. Patient warm to the touch.      89-year-old female presents with fevers at her nursing home today and then vomited once.  The staff gave her Tylenol currently appears at her baseline she follows some commands is alert and awake.  No neurologic deficits.  No trauma noted.  No other symptoms reported of cough congestion diarrhea neck pain neck stiffness or any other symptoms she does have wound on the right side of her breast.      History provided by:  Patient   used: No        Prior to Admission Medications   Prescriptions Last Dose Informant Patient Reported? Taking?   Melatonin 3-10 MG TABS  Care Giver Yes No   Sig: Take 3 mg by mouth   Menthol-Zinc Oxide (REMEDY CALAZIME EX)   Yes No   Sig: Apply topically   Patient not taking: Reported on 8/15/2022   PARoxetine (PAXIL) 30 mg tablet  Care Giver Yes No   Sig: Take 40 mg by mouth every morning     acetaminophen (TYLENOL) 500 mg tablet  Self Yes No   Sig: Take 1,000 mg by mouth every 6 (six) hours as needed for mild pain    acetaminophen-codeine (TYLENOL #3) 300-30 mg per tablet   Yes No   Sig: Take 1 tablet by mouth every 6 (six) hours as needed for moderate pain   amLODIPine (NORVASC) 5 mg tablet   Yes No   Sig: Take 5 mg by mouth daily.   Patient not taking: Reported on 8/15/2022   cycloSPORINE (RESTASIS) 0.05 % ophthalmic emulsion   Yes No   Sig: Administer 1 drop to both eyes 2 (two) times a day.   glipiZIDE (GLUCOTROL) 10 mg tablet  Care Giver Yes No   Sig: Take 5 mg by mouth daily    Patient not taking: Reported on 8/15/2022   hydrOXYzine HCL (ATARAX) 25 mg tablet   Yes No   Sig: Take 25 mg by mouth daily at bedtime   insulin glargine (LANTUS SOLOSTAR) 100 units/mL injection pen  Care Giver Yes No   Sig: Inject 15 Units under the skin daily   losartan (COZAAR) 50 mg  tablet   Yes No   Sig: Take 50 mg by mouth daily.   memantine (NAMENDA) 5 mg tablet   Yes No   Sig: Take 5 mg by mouth daily   metFORMIN (GLUCOPHAGE) 500 mg tablet  Care Giver Yes No   Sig: Take 500 mg by mouth 2 (two) times a day with meals   mirtazapine (Remeron) 15 mg tablet   Yes No   Sig: Take 15 mg by mouth daily at bedtime   multivitamin (THERAGRAN) TABS   Yes No   Sig: Take 1 tablet by mouth daily.   nystatin (MYCOSTATIN) cream   Yes No   Sig: Apply 1 application topically 2 (two) times a day Under both breasts   pantoprazole (PROTONIX) 40 mg tablet   Yes No   Sig: Take 40 mg by mouth daily.   simvastatin (ZOCOR) 10 mg tablet   Yes No   Sig: Take 10 mg by mouth daily at bedtime.   sitaGLIPtin (JANUVIA) 100 mg tablet   No No   Sig: Take 1 tablet (100 mg total) by mouth daily      Facility-Administered Medications: None       Past Medical History:   Diagnosis Date    Depression     Diabetes mellitus (HCC)     Gait difficulty     Hyperlipidemia     Hypertension     Incontinence of urine     Macula lutea degeneration     Osteoarthritis        Past Surgical History:   Procedure Laterality Date    APPENDECTOMY      HYSTERECTOMY      ORIF SHOULDER DISLOCATION W/ HUMERAL FRACTURE         Family History   Problem Relation Age of Onset    Heart disease Mother     No Known Problems Father     No Known Problems Sister     No Known Problems Brother     No Known Problems Maternal Aunt     No Known Problems Maternal Uncle     No Known Problems Paternal Aunt     No Known Problems Paternal Uncle     No Known Problems Maternal Grandmother     No Known Problems Maternal Grandfather     No Known Problems Paternal Grandmother     No Known Problems Paternal Grandfather     ADD / ADHD Neg Hx     Anesthesia problems Neg Hx     Cancer Neg Hx     Clotting disorder Neg Hx     Collagen disease Neg Hx     Diabetes Neg Hx     Dislocations Neg Hx     Learning disabilities Neg Hx     Neurological problems Neg Hx     Osteoporosis Neg Hx      Rheumatologic disease Neg Hx     Scoliosis Neg Hx     Vascular Disease Neg Hx      I have reviewed and agree with the history as documented.    E-Cigarette/Vaping    E-Cigarette Use Never User      E-Cigarette/Vaping Substances    Nicotine No     THC No     CBD No     Flavoring No     Other No     Unknown No      Social History     Tobacco Use    Smoking status: Never    Smokeless tobacco: Never   Vaping Use    Vaping status: Never Used   Substance Use Topics    Alcohol use: Not Currently    Drug use: Not Currently       Review of Systems   Constitutional:  Positive for chills and fever.   HENT: Negative.     Eyes: Negative.    Respiratory: Negative.     Cardiovascular: Negative.    Gastrointestinal:  Positive for nausea and vomiting.   Endocrine: Negative.    Genitourinary: Negative.    Musculoskeletal: Negative.    Skin: Negative.    Allergic/Immunologic: Negative.    Neurological: Negative.    Hematological: Negative.    Psychiatric/Behavioral:  Positive for decreased concentration.    All other systems reviewed and are negative.      Physical Exam  Physical Exam  Vitals and nursing note reviewed.   Constitutional:       Appearance: Normal appearance.   HENT:      Head: Normocephalic and atraumatic.      Nose: Nose normal.      Mouth/Throat:      Mouth: Mucous membranes are moist.   Eyes:      Extraocular Movements: Extraocular movements intact.      Pupils: Pupils are equal, round, and reactive to light.   Cardiovascular:      Rate and Rhythm: Normal rate and regular rhythm.   Pulmonary:      Effort: Pulmonary effort is normal.      Breath sounds: Normal breath sounds.   Abdominal:      General: Abdomen is flat. Bowel sounds are normal.      Palpations: Abdomen is soft.   Musculoskeletal:         General: Normal range of motion.      Cervical back: Normal range of motion and neck supple.   Skin:     General: Skin is warm.      Capillary Refill: Capillary refill takes less than 2 seconds.      Comments: Right  breast small area of wound opening noted with slight surrounding erythema with some purulent drainage.  No fluctuance noted otherwise.  No evidence of mastitis.   Neurological:      General: No focal deficit present.      Mental Status: She is alert. Mental status is at baseline.      Cranial Nerves: No cranial nerve deficit.      Sensory: No sensory deficit.      Motor: No weakness.      Coordination: Coordination normal.      Gait: Gait normal.      Deep Tendon Reflexes: Reflexes normal.   Psychiatric:         Mood and Affect: Mood normal.         Thought Content: Thought content normal.         Vital Signs  ED Triage Vitals [06/09/24 1942]   Temperature Pulse Respirations Blood Pressure SpO2   99.9 °F (37.7 °C) (!) 116 (!) 23 140/69 92 %      Temp Source Heart Rate Source Patient Position - Orthostatic VS BP Location FiO2 (%)   Oral Monitor Lying Right arm --      Pain Score       --           Vitals:    06/09/24 1942 06/09/24 2000 06/09/24 2030 06/09/24 2130   BP: 140/69 126/84 159/98 127/90   Pulse: (!) 116 (!) 116 98 90   Patient Position - Orthostatic VS: Lying  Lying Lying         Visual Acuity      ED Medications  Medications   piperacillin-tazobactam (ZOSYN) IVPB 4.5 g (0 g Intravenous Stopped 6/9/24 2049)   sodium chloride 0.9 % bolus 500 mL (0 mL Intravenous Stopped 6/9/24 2044)   sodium chloride 0.9 % bolus 1,000 mL (1,000 mL Intravenous New Bag 6/9/24 2043)   iohexol (OMNIPAQUE) 350 MG/ML injection (MULTI-DOSE) 100 mL (100 mL Intravenous Given 6/9/24 2059)       Diagnostic Studies  Results Reviewed       Procedure Component Value Units Date/Time    Lactic acid 2 Hours [[id number]] Collected: 06/09/24 2148    Lab Status: In process Specimen: Blood from Arm, Right Updated: 06/09/24 2152    Wound culture and Gram stain [[id number]] Collected: 06/09/24 2108    Lab Status: In process Specimen: Wound from Breast, Right Updated: 06/09/24 2111    RBC Morphology Reflex Test [[id number]] Collected: 06/09/24 1952     Lab Status: Final result Specimen: Blood from Arm, Right Updated: 06/09/24 2101    COVID/FLU/RSV [[id number]]  (Normal) Collected: 06/09/24 1952    Lab Status: Final result Specimen: Nares from Nose Updated: 06/09/24 2044     SARS-CoV-2 Negative     INFLUENZA A PCR Negative     INFLUENZA B PCR Negative     RSV PCR Negative    Narrative:      FOR PEDIATRIC PATIENTS - copy/paste COVID Guidelines URL to browser: [email or web address]    SARS-CoV-2 assay is a Nucleic Acid Amplification assay intended for the  qualitative detection of nucleic acid from SARS-CoV-2 in nasopharyngeal  swabs. Results are for the presumptive identification of SARS-CoV-2 RNA.    Positive results are indicative of infection with SARS-CoV-2, the virus  causing COVID-19, but do not rule out bacterial infection or co-infection  with other viruses. Laboratories within the [place] and its  territories are required to report all positive results to the appropriate  public health authorities. Negative results do not preclude SARS-CoV-2  infection and should not be used as the sole basis for treatment or other  patient management decisions. Negative results must be combined with  clinical observations, patient history, and epidemiological information.  This test has not been FDA cleared or approved.    This test has been authorized by FDA under an Emergency Use Authorization  (EUA). This test is only authorized for the duration of time the  declaration that circumstances exist justifying the authorization of the  emergency use of an in vitro diagnostic tests for detection of SARS-CoV-2  virus and/or diagnosis of COVID-19 infection under section 564(b)(1) of  the Act, 21 U.S.C. 360bbb-3(b)(1), unless the authorization is terminated  or revoked sooner. The test has been validated but independent review by FDA  and CLIA is pending.    Test performed using [employer]: This RT-PCR assay  targets N2,  a region unique to SARS-CoV-2. A conserved region in the E-gene was chosen  for pan-Sarbecovirus detection which includes SARS-CoV-2.    According to CMS-2020-01-R, this platform meets the definition of high-throughput technology.    Urine Microscopic [[id number]]  (Abnormal) Collected: 06/09/24 2004    Lab Status: Final result Specimen: Urine, Clean Catch Updated: 06/09/24 2039     RBC, UA       Field obscured, unable to enumerate     /hpf     WBC, UA Innumerable /hpf      Epithelial Cells       Field obscured, unable to enumerate     /hpf     Bacteria, UA Innumerable /hpf     Urine culture [[id number]] Collected: 06/09/24 2004    Lab Status: In process Specimen: Urine, Clean Catch Updated: 06/09/24 2039    Procalcitonin [[id number]]  (Abnormal) Collected: 06/09/24 1952    Lab Status: Final result Specimen: Blood from Arm, Right Updated: 06/09/24 2030     Procalcitonin 0.51 ng/ml     CBC and differential [[id number]]  (Abnormal) Collected: 06/09/24 1952    Lab Status: Final result Specimen: Blood from Arm, Right Updated: 06/09/24 2028     WBC 13.04 Thousand/uL      RBC 3.93 Million/uL      Hemoglobin 11.7 g/dL      Hematocrit 36.4 %      MCV 93 fL      MCH 29.8 pg      MCHC 32.1 g/dL      RDW 13.4 %      MPV 9.7 fL      Platelets 257 Thousands/uL     Narrative:      This is an appended report.  These results have been appended to a previously verified report.    Manual Differential(PHLEBS Do Not Order) [[id number]]  (Abnormal) Collected: 06/09/24 1952    Lab Status: Final result Specimen: Blood from Arm, Right Updated: 06/09/24 2028     Segmented % 86 %      Bands % 6 %      Lymphocytes % 4 %      Monocytes % 4 %      Eosinophils % 0 %      Basophils % 0 %      Absolute Neutrophils 12.00 Thousand/uL      Absolute Lymphocytes 0.52 Thousand/uL      Absolute Monocytes 0.52 Thousand/uL      Absolute Eosinophils 0.00 Thousand/uL      Absolute Basophils 0.00 Thousand/uL      Total Counted --     RBC Morphology  Normal     Platelet Estimate Adequate     Large Platelet Present    Comprehensive metabolic panel [[id number]]  (Abnormal) Collected: 06/09/24 1952    Lab Status: Final result Specimen: Blood from Arm, Right Updated: 06/09/24 2021     Sodium 137 mmol/L      Potassium 3.8 mmol/L      Chloride 103 mmol/L      CO2 20 mmol/L      ANION GAP 14 mmol/L      BUN 18 mg/dL      Creatinine 1.02 mg/dL      Glucose 208 mg/dL      Calcium 9.2 mg/dL      AST 14 U/L      ALT 9 U/L      Alkaline Phosphatase 75 U/L      Total Protein 6.6 g/dL      Albumin 3.8 g/dL      Total Bilirubin 0.33 mg/dL      eGFR 47 ml/min/1.73sq m     Narrative:      National Kidney Disease Foundation guidelines for Chronic Kidney Disease (CKD):     Stage 1 with normal or high GFR (GFR > 90 mL/min/1.73 square meters)    Stage 2 Mild CKD (GFR = 60-89 mL/min/1.73 square meters)    Stage 3A Moderate CKD (GFR = 45-59 mL/min/1.73 square meters)    Stage 3B Moderate CKD (GFR = 30-44 mL/min/1.73 square meters)    Stage 4 Severe CKD (GFR = 15-29 mL/min/1.73 square meters)    Stage 5 End Stage CKD (GFR <15 mL/min/1.73 square meters)  Note: GFR calculation is accurate only with a steady state creatinine    Protime-INR [[id number]]  (Normal) Collected: 06/09/24 1952    Lab Status: Final result Specimen: Blood from Arm, Right Updated: 06/09/24 2021     Protime 13.7 seconds      INR 1.03    APTT [[id number]]  (Abnormal) Collected: 06/09/24 1952    Lab Status: Final result Specimen: Blood from Arm, Right Updated: 06/09/24 2021     PTT 21 seconds     Narrative:      No clots    UA w Reflex to Microscopic w Reflex to Culture [[id number]]  (Abnormal) Collected: 06/09/24 2004    Lab Status: Final result Specimen: Urine, Clean Catch Updated: 06/09/24 2020     Color, UA Light Red     Clarity, UA Cloudy     Specific Gravity, UA 1.020     pH, UA 7.0     Leukocytes, UA Large     Nitrite, UA Negative     Protein, UA 30 (1+) mg/dl      Glucose, UA 70 (7/100%) mg/dl      Ketones, UA  Negative mg/dl      Urobilinogen, UA <2.0 mg/dl      Bilirubin, UA Negative     Occult Blood, UA Large    Lactic acid [[id number]]  (Abnormal) Collected: 06/09/24 1952    Lab Status: Final result Specimen: Blood from Arm, Right Updated: 06/09/24 2020     LACTIC ACID 3.9 mmol/L     Narrative:      Result may be elevated if tourniquet was used during collection.    Blood culture #2 [[id number]] Collected: 06/09/24 1952    Lab Status: In process Specimen: Blood from Arm, Left Updated: 06/09/24 2000    Blood culture #1 [[id number]] Collected: 06/09/24 1952    Lab Status: In process Specimen: Blood from Arm, Right Updated: 06/09/24 2000                   CT chest abdomen pelvis w contrast    (Results Pending)              Procedures  ECG 12 Lead Documentation Only    Date/Time: 6/9/2024 9:59 PM    Performed by: Vandana Basilio DO  Authorized by: Vandana Basilio DO    ECG reviewed by me, the ED Provider: yes    Patient location:  ED  Previous ECG:     Previous ECG:  Unavailable    Comparison to cardiac monitor: Yes    Interpretation:     Interpretation: non-specific    Rate:     ECG rate assessment: normal    Rhythm:     Rhythm: sinus rhythm    Ectopy:     Ectopy: none    QRS:     QRS axis:  Normal  Conduction:     Conduction: normal    ST segments:     ST segments:  Non-specific  T waves:     T waves: non-specific             ED Course                               SBIRT 20yo+      Flowsheet Row Most Recent Value   Initial Alcohol Screen: US AUDIT-C     1. How often do you have a drink containing alcohol? 0 Filed at: 06/09/2024 1944   2. How many drinks containing alcohol do you have on a typical day you are drinking?  0 Filed at: 06/09/2024 1944   3a. Male UNDER 65: How often do you have five or more drinks on one occasion? 0 Filed at: 06/09/2024 1944   3b. FEMALE Any Age, or MALE 65+: How often do you have 4 or more drinks on one occassion? 0 Filed at: 06/09/2024 1944   Audit-C Score 0 Filed at: 06/09/2024 1944   KRISTA: How  many times in the past year have you...    Used an illegal drug or used a prescription medication for non-medical reasons? Never Filed at: 06/09/2024 1944                      Medical Decision Making  Patient evaluated with EKG labs imaging.  She is septic lactic acid elevated and she is a source of urinary tract infection as well as a wound infection on her right breast.  Patient given IV antibiotics and IV fluids.  Admitted to the hospitalist for further evaluation and management.    Amount and/or Complexity of Data Reviewed  External Data Reviewed: notes.  Labs: ordered. Decision-making details documented in ED Course.  Radiology: ordered. Decision-making details documented in ED Course.  ECG/medicine tests: ordered and independent interpretation performed. Decision-making details documented in ED Course.    Risk  Prescription drug management.  Decision regarding hospitalization.             Disposition  Final diagnoses:   Breast wound   Fever   UTI (urinary tract infection)   Sepsis (HCC)     Time reflects when diagnosis was documented in both MDM as applicable and the Disposition within this note       Time User Action Codes Description Comment    6/9/2024  9:59 PM Anepu, Vandana Add [S21.009A] Breast wound     6/9/2024  9:59 PM Anepu, Vandana Add [R50.9] Fever     6/9/2024  9:59 PM Anepu, Vandana Add [N39.0] UTI (urinary tract infection)     6/9/2024 10:00 PM Anepu, Vandana Add [A41.9] Sepsis (HCC)           ED Disposition       ED Disposition   Admit    Condition   Stable    Date/Time   Sun Jun 9, 2024 2159    Comment                   Follow-up Information    None         Patient's Medications   Discharge Prescriptions    No medications on file       No discharge procedures on file.    PDMP Review       None            ED Provider  Electronically Signed by             Vandana Basilio DO  06/09/24 2200       Vandana Basilio DO  06/09/24 2204

## 2024-06-10 PROBLEM — E78.5 HYPERLIPIDEMIA: Status: ACTIVE | Noted: 2019-05-21

## 2024-06-10 PROBLEM — Z79.4 TYPE 2 DIABETES MELLITUS, WITH LONG-TERM CURRENT USE OF INSULIN (HCC): Status: ACTIVE | Noted: 2019-05-21

## 2024-06-10 PROBLEM — K21.9 GERD (GASTROESOPHAGEAL REFLUX DISEASE): Status: ACTIVE | Noted: 2024-06-10

## 2024-06-10 PROBLEM — K59.00 CONSTIPATION: Status: ACTIVE | Noted: 2024-06-10

## 2024-06-10 LAB
ANION GAP SERPL CALCULATED.3IONS-SCNC: 10 MMOL/L (ref 4–13)
ATRIAL RATE: 112 BPM
BUN SERPL-MCNC: 13 MG/DL (ref 5–25)
CALCIUM SERPL-MCNC: 7.8 MG/DL (ref 8.4–10.2)
CHLORIDE SERPL-SCNC: 104 MMOL/L (ref 96–108)
CO2 SERPL-SCNC: 21 MMOL/L (ref 21–32)
CREAT SERPL-MCNC: 0.93 MG/DL (ref 0.6–1.3)
ERYTHROCYTE [DISTWIDTH] IN BLOOD BY AUTOMATED COUNT: 13.6 % (ref 11.6–15.1)
EST. AVERAGE GLUCOSE BLD GHB EST-MCNC: 131 MG/DL
GFR SERPL CREATININE-BSD FRML MDRD: 52 ML/MIN/1.73SQ M
GLUCOSE SERPL-MCNC: 115 MG/DL (ref 65–140)
GLUCOSE SERPL-MCNC: 119 MG/DL (ref 65–140)
GLUCOSE SERPL-MCNC: 125 MG/DL (ref 65–140)
GLUCOSE SERPL-MCNC: 130 MG/DL (ref 65–140)
GLUCOSE SERPL-MCNC: 137 MG/DL (ref 65–140)
GLUCOSE SERPL-MCNC: 153 MG/DL (ref 65–140)
GLUCOSE SERPL-MCNC: 176 MG/DL (ref 65–140)
HBA1C MFR BLD: 6.2 %
HCT VFR BLD AUTO: 31.4 % (ref 34.8–46.1)
HGB BLD-MCNC: 10 G/DL (ref 11.5–15.4)
LACTATE SERPL-SCNC: 1.6 MMOL/L (ref 0.5–2)
LACTATE SERPL-SCNC: 2.1 MMOL/L (ref 0.5–2)
MAGNESIUM SERPL-MCNC: 1.4 MG/DL (ref 1.9–2.7)
MAGNESIUM SERPL-MCNC: 1.5 MG/DL (ref 1.9–2.7)
MCH RBC QN AUTO: 29.9 PG (ref 26.8–34.3)
MCHC RBC AUTO-ENTMCNC: 31.8 G/DL (ref 31.4–37.4)
MCV RBC AUTO: 94 FL (ref 82–98)
NRBC BLD AUTO-RTO: 0 /100 WBCS
P AXIS: 37 DEGREES
PLATELET # BLD AUTO: 230 THOUSANDS/UL (ref 149–390)
PMV BLD AUTO: 10 FL (ref 8.9–12.7)
POTASSIUM SERPL-SCNC: 3.8 MMOL/L (ref 3.5–5.3)
PR INTERVAL: 144 MS
PROCALCITONIN SERPL-MCNC: 2.01 NG/ML
QRS AXIS: 34 DEGREES
QRSD INTERVAL: 78 MS
QT INTERVAL: 330 MS
QTC INTERVAL: 450 MS
RBC # BLD AUTO: 3.35 MILLION/UL (ref 3.81–5.12)
SODIUM SERPL-SCNC: 135 MMOL/L (ref 135–147)
T WAVE AXIS: 118 DEGREES
VENTRICULAR RATE: 112 BPM
WBC # BLD AUTO: 11 THOUSAND/UL (ref 4.31–10.16)

## 2024-06-10 PROCEDURE — 87081 CULTURE SCREEN ONLY: CPT | Performed by: STUDENT IN AN ORGANIZED HEALTH CARE EDUCATION/TRAINING PROGRAM

## 2024-06-10 PROCEDURE — 82948 REAGENT STRIP/BLOOD GLUCOSE: CPT

## 2024-06-10 PROCEDURE — 83605 ASSAY OF LACTIC ACID: CPT | Performed by: NURSE PRACTITIONER

## 2024-06-10 PROCEDURE — 97530 THERAPEUTIC ACTIVITIES: CPT

## 2024-06-10 PROCEDURE — 92610 EVALUATE SWALLOWING FUNCTION: CPT

## 2024-06-10 PROCEDURE — 97163 PT EVAL HIGH COMPLEX 45 MIN: CPT

## 2024-06-10 PROCEDURE — 85027 COMPLETE CBC AUTOMATED: CPT | Performed by: NURSE PRACTITIONER

## 2024-06-10 PROCEDURE — 97167 OT EVAL HIGH COMPLEX 60 MIN: CPT

## 2024-06-10 PROCEDURE — 93010 ELECTROCARDIOGRAM REPORT: CPT | Performed by: INTERNAL MEDICINE

## 2024-06-10 PROCEDURE — 84145 PROCALCITONIN (PCT): CPT | Performed by: NURSE PRACTITIONER

## 2024-06-10 PROCEDURE — 83735 ASSAY OF MAGNESIUM: CPT | Performed by: NURSE PRACTITIONER

## 2024-06-10 PROCEDURE — 80048 BASIC METABOLIC PNL TOTAL CA: CPT | Performed by: NURSE PRACTITIONER

## 2024-06-10 PROCEDURE — 99232 SBSQ HOSP IP/OBS MODERATE 35: CPT | Performed by: FAMILY MEDICINE

## 2024-06-10 RX ORDER — INSULIN LISPRO 100 [IU]/ML
1-5 INJECTION, SOLUTION INTRAVENOUS; SUBCUTANEOUS
Status: DISCONTINUED | OUTPATIENT
Start: 2024-06-10 | End: 2024-06-14 | Stop reason: HOSPADM

## 2024-06-10 RX ORDER — LOSARTAN POTASSIUM 50 MG/1
50 TABLET ORAL DAILY
Status: DISCONTINUED | OUTPATIENT
Start: 2024-06-10 | End: 2024-06-10

## 2024-06-10 RX ORDER — LOSARTAN POTASSIUM 50 MG/1
50 TABLET ORAL DAILY
Status: DISCONTINUED | OUTPATIENT
Start: 2024-06-10 | End: 2024-06-14 | Stop reason: HOSPADM

## 2024-06-10 RX ORDER — MAGNESIUM SULFATE 1 G/100ML
1 INJECTION INTRAVENOUS ONCE
Status: COMPLETED | OUTPATIENT
Start: 2024-06-10 | End: 2024-06-10

## 2024-06-10 RX ORDER — SACCHAROMYCES BOULARDII 250 MG
250 CAPSULE ORAL 2 TIMES DAILY
Status: DISCONTINUED | OUTPATIENT
Start: 2024-06-10 | End: 2024-06-14 | Stop reason: HOSPADM

## 2024-06-10 RX ORDER — LANOLIN ALCOHOL/MO/W.PET/CERES
4.5 CREAM (GRAM) TOPICAL
Status: DISCONTINUED | OUTPATIENT
Start: 2024-06-10 | End: 2024-06-14 | Stop reason: HOSPADM

## 2024-06-10 RX ORDER — DOCUSATE SODIUM 100 MG/1
100 CAPSULE, LIQUID FILLED ORAL 2 TIMES DAILY
Status: DISCONTINUED | OUTPATIENT
Start: 2024-06-10 | End: 2024-06-12

## 2024-06-10 RX ORDER — PAROXETINE HYDROCHLORIDE 20 MG/1
40 TABLET, FILM COATED ORAL EVERY MORNING
Status: DISCONTINUED | OUTPATIENT
Start: 2024-06-10 | End: 2024-06-14 | Stop reason: HOSPADM

## 2024-06-10 RX ORDER — PANTOPRAZOLE SODIUM 40 MG/1
40 TABLET, DELAYED RELEASE ORAL
Status: DISCONTINUED | OUTPATIENT
Start: 2024-06-10 | End: 2024-06-14 | Stop reason: HOSPADM

## 2024-06-10 RX ORDER — INSULIN LISPRO 100 [IU]/ML
INJECTION, SOLUTION INTRAVENOUS; SUBCUTANEOUS
Status: DISPENSED
Start: 2024-06-10 | End: 2024-06-10

## 2024-06-10 RX ORDER — MIRTAZAPINE 15 MG/1
15 TABLET, FILM COATED ORAL
Status: DISCONTINUED | OUTPATIENT
Start: 2024-06-10 | End: 2024-06-14 | Stop reason: HOSPADM

## 2024-06-10 RX ORDER — MEMANTINE HYDROCHLORIDE 5 MG/1
5 TABLET ORAL 2 TIMES DAILY
Status: DISCONTINUED | OUTPATIENT
Start: 2024-06-10 | End: 2024-06-14 | Stop reason: HOSPADM

## 2024-06-10 RX ORDER — LOPERAMIDE HYDROCHLORIDE 2 MG/1
2 CAPSULE ORAL DAILY
Status: DISCONTINUED | OUTPATIENT
Start: 2024-06-10 | End: 2024-06-12

## 2024-06-10 RX ORDER — POLYETHYLENE GLYCOL 3350 17 G/17G
17 POWDER, FOR SOLUTION ORAL DAILY PRN
Status: DISCONTINUED | OUTPATIENT
Start: 2024-06-10 | End: 2024-06-14 | Stop reason: HOSPADM

## 2024-06-10 RX ORDER — ACETAMINOPHEN 325 MG/1
650 TABLET ORAL 2 TIMES DAILY
Status: DISCONTINUED | OUTPATIENT
Start: 2024-06-10 | End: 2024-06-14 | Stop reason: HOSPADM

## 2024-06-10 RX ORDER — ENOXAPARIN SODIUM 100 MG/ML
30 INJECTION SUBCUTANEOUS DAILY
Status: DISCONTINUED | OUTPATIENT
Start: 2024-06-10 | End: 2024-06-14 | Stop reason: HOSPADM

## 2024-06-10 RX ORDER — ACETAMINOPHEN 325 MG/1
650 TABLET ORAL EVERY 8 HOURS PRN
Status: DISCONTINUED | OUTPATIENT
Start: 2024-06-10 | End: 2024-06-14 | Stop reason: HOSPADM

## 2024-06-10 RX ORDER — LANOLIN ALCOHOL/MO/W.PET/CERES
400 CREAM (GRAM) TOPICAL DAILY
Status: DISCONTINUED | OUTPATIENT
Start: 2024-06-10 | End: 2024-06-14 | Stop reason: HOSPADM

## 2024-06-10 RX ORDER — DONEPEZIL HYDROCHLORIDE 5 MG/1
5 TABLET, FILM COATED ORAL
Status: DISCONTINUED | OUTPATIENT
Start: 2024-06-10 | End: 2024-06-14 | Stop reason: HOSPADM

## 2024-06-10 RX ORDER — TRAMADOL HYDROCHLORIDE 50 MG/1
25 TABLET ORAL EVERY 8 HOURS PRN
Status: DISCONTINUED | OUTPATIENT
Start: 2024-06-09 | End: 2024-06-14 | Stop reason: HOSPADM

## 2024-06-10 RX ORDER — SODIUM CHLORIDE, SODIUM LACTATE, POTASSIUM CHLORIDE, CALCIUM CHLORIDE 600; 310; 30; 20 MG/100ML; MG/100ML; MG/100ML; MG/100ML
50 INJECTION, SOLUTION INTRAVENOUS CONTINUOUS
Status: DISCONTINUED | OUTPATIENT
Start: 2024-06-10 | End: 2024-06-14 | Stop reason: HOSPADM

## 2024-06-10 RX ORDER — MELATONIN 3 MG
5 TABLET ORAL
Status: DISCONTINUED | OUTPATIENT
Start: 2024-06-10 | End: 2024-06-10 | Stop reason: RX

## 2024-06-10 RX ORDER — OXYBUTYNIN CHLORIDE 5 MG/1
10 TABLET, EXTENDED RELEASE ORAL DAILY
Status: DISCONTINUED | OUTPATIENT
Start: 2024-06-10 | End: 2024-06-14 | Stop reason: HOSPADM

## 2024-06-10 RX ORDER — INSULIN GLARGINE 100 [IU]/ML
15 INJECTION, SOLUTION SUBCUTANEOUS EVERY MORNING
Status: DISCONTINUED | OUTPATIENT
Start: 2024-06-10 | End: 2024-06-14 | Stop reason: HOSPADM

## 2024-06-10 RX ORDER — ENOXAPARIN SODIUM 100 MG/ML
40 INJECTION SUBCUTANEOUS DAILY
Status: DISCONTINUED | OUTPATIENT
Start: 2024-06-10 | End: 2024-06-10 | Stop reason: DRUGHIGH

## 2024-06-10 RX ADMIN — MAGNESIUM SULFATE HEPTAHYDRATE 1 G: 1 INJECTION, SOLUTION INTRAVENOUS at 02:35

## 2024-06-10 RX ADMIN — GLYCERIN 1 DROP: .002; .002; .01 SOLUTION/ DROPS OPHTHALMIC at 08:50

## 2024-06-10 RX ADMIN — SODIUM CHLORIDE, SODIUM LACTATE, POTASSIUM CHLORIDE, AND CALCIUM CHLORIDE 50 ML/HR: .6; .31; .03; .02 INJECTION, SOLUTION INTRAVENOUS at 00:30

## 2024-06-10 RX ADMIN — GLYCERIN 1 DROP: .002; .002; .01 SOLUTION/ DROPS OPHTHALMIC at 17:23

## 2024-06-10 RX ADMIN — MIRTAZAPINE 15 MG: 15 TABLET, FILM COATED ORAL at 20:51

## 2024-06-10 RX ADMIN — PAROXETINE HYDROCHLORIDE 40 MG: 20 TABLET, FILM COATED ORAL at 08:49

## 2024-06-10 RX ADMIN — PIPERACILLIN SODIUM,TAZOBACTAM SODIUM 4.5 G: 4; .5 INJECTION, POWDER, FOR SOLUTION INTRAVENOUS at 23:18

## 2024-06-10 RX ADMIN — DONEPEZIL HYDROCHLORIDE 5 MG: 5 TABLET ORAL at 20:51

## 2024-06-10 RX ADMIN — Medication 4.5 MG: at 00:29

## 2024-06-10 RX ADMIN — MEMANTINE 5 MG: 5 TABLET ORAL at 08:49

## 2024-06-10 RX ADMIN — PIPERACILLIN SODIUM,TAZOBACTAM SODIUM 4.5 G: 4; .5 INJECTION, POWDER, FOR SOLUTION INTRAVENOUS at 17:12

## 2024-06-10 RX ADMIN — MEMANTINE 5 MG: 5 TABLET ORAL at 17:23

## 2024-06-10 RX ADMIN — MIRTAZAPINE 15 MG: 15 TABLET, FILM COATED ORAL at 00:29

## 2024-06-10 RX ADMIN — PANTOPRAZOLE SODIUM 40 MG: 40 TABLET, DELAYED RELEASE ORAL at 05:06

## 2024-06-10 RX ADMIN — ACETAMINOPHEN 650 MG: 325 TABLET ORAL at 00:30

## 2024-06-10 RX ADMIN — INSULIN GLARGINE 15 UNITS: 100 INJECTION, SOLUTION SUBCUTANEOUS at 08:48

## 2024-06-10 RX ADMIN — Medication 250 MG: at 17:23

## 2024-06-10 RX ADMIN — DOCUSATE SODIUM 100 MG: 100 CAPSULE, LIQUID FILLED ORAL at 17:23

## 2024-06-10 RX ADMIN — PIPERACILLIN SODIUM,TAZOBACTAM SODIUM 4.5 G: 4; .5 INJECTION, POWDER, FOR SOLUTION INTRAVENOUS at 00:54

## 2024-06-10 RX ADMIN — Medication 4.5 MG: at 20:53

## 2024-06-10 RX ADMIN — ENOXAPARIN SODIUM 30 MG: 30 INJECTION SUBCUTANEOUS at 08:49

## 2024-06-10 RX ADMIN — Medication 400 MG: at 08:49

## 2024-06-10 RX ADMIN — ACETAMINOPHEN 650 MG: 325 TABLET ORAL at 08:49

## 2024-06-10 RX ADMIN — Medication 1000 UNITS: at 08:49

## 2024-06-10 RX ADMIN — ACETAMINOPHEN 650 MG: 325 TABLET ORAL at 20:50

## 2024-06-10 RX ADMIN — Medication 250 MG: at 08:49

## 2024-06-10 RX ADMIN — PIPERACILLIN SODIUM,TAZOBACTAM SODIUM 4.5 G: 4; .5 INJECTION, POWDER, FOR SOLUTION INTRAVENOUS at 08:47

## 2024-06-10 RX ADMIN — DONEPEZIL HYDROCHLORIDE 5 MG: 5 TABLET ORAL at 00:30

## 2024-06-10 RX ADMIN — OXYBUTYNIN CHLORIDE 10 MG: 5 TABLET, EXTENDED RELEASE ORAL at 08:49

## 2024-06-10 RX ADMIN — INSULIN LISPRO 1 UNITS: 100 INJECTION, SOLUTION INTRAVENOUS; SUBCUTANEOUS at 00:53

## 2024-06-10 RX ADMIN — LOPERAMIDE HYDROCHLORIDE 2 MG: 2 CAPSULE ORAL at 08:49

## 2024-06-10 NOTE — ASSESSMENT & PLAN NOTE
Patient presents with vomiting after dinner, then started with chills, fever 100.1, pulse rate 121 from nursing home per transfer paper.  Has chronic right breast wound.  Sepsis, present on admission, as evidenced by WBC 13, tachycardia, tachypnea, with suspected source of infection UTI.  Right breast wound appears no evidence of infection on exam.  CT prelim report concerning for breast cancer.  UA showed large leukocytes, innumerable WBC  Initial lactic acid 3.9.  Initial procalcitonin 0.51  Given Zosyn in ED. Will continue.  Follow-up official report of CT chest abdomen pelvis  Follow-up urine culture, blood cultures, wound culture  Trend lactic acid until less than 2  Repeat CBC with differential, procalcitonin in the morning

## 2024-06-10 NOTE — ASSESSMENT & PLAN NOTE
IV Zosyn as above  Urine culture in March 2024 grew E. coli and Aerococcus urinae, pan susceptible.  Follow-up urine culture  Straight cath in ED for specimen obtained 60 cc of urine.

## 2024-06-10 NOTE — SPEECH THERAPY NOTE
Speech-Language Pathology Bedside Swallow Evaluation      Patient Name: Sabrina Barclay    Today's Date: 6/10/2024     Problem List  Principal Problem:    Severe sepsis (HCC)  Active Problems:    Benign essential hypertension    Type 2 diabetes mellitus, with long-term current use of insulin (HCC)    Depression    UTI (urinary tract infection)    Wound of right breast    Dementia (HCC)    CKD (chronic kidney disease) stage 3, GFR 30-59 ml/min (HCC)    GERD (gastroesophageal reflux disease)      Past Medical History  Past Medical History:   Diagnosis Date    Anxiety     CKD (chronic kidney disease) stage 3, GFR 30-59 ml/min (HCC)     Dementia (HCC)     Depression     Diabetes mellitus (HCC)     Gait difficulty     GERD (gastroesophageal reflux disease)     Hyperlipidemia     Hypertension     Incontinence of urine     Macula lutea degeneration     Neurocognitive disorder     Osteoarthritis        Past Surgical History  Past Surgical History:   Procedure Laterality Date    APPENDECTOMY      HYSTERECTOMY      ORIF SHOULDER DISLOCATION W/ HUMERAL FRACTURE         Summary   Pt presented with functional appearing pharyngeal stage swallowing skills but s/s moderate oral dysphagia with materials administered today.    Recommended Diet: mechanically altered/level 2 diet (to begin) and thin liquids   Recommended Form of Meds: OK for small pills whole w/liqudi (please cut/crush large pills)  Swallowing strategies: lingual sweep and alternate texture food with puree to assist with clearing oral cavity of food  Other Recommendations: Continue oral care after all meals (r/o food retention in mouth after all meals)        Current Medical Status  Sabrina Barclay is a 89 y.o. F with PMH of dementia, depression, CKD 3, hypertension, type 2 diabetes, GERD, chronic R breast wound who presented 6/9 pm after vomiting after dinner, then started with chills, fever 100.1, pulse rate 121.   DX: severe sepsis with suspected source of infection UTI  (UA showed large leukocytes, innumerable WBC ), R breast wound (appears no evidence of infection on exam.  CT prelim report concerning for breast cancer), UTI, GERD, CKD, T2DM, depression.   SLP Swallowing Evaluation ordered at this time.     Current Precautions:  Fall      Allergies:  No known food allergies    Past medical history:  Please see H&P for details    Special Studies: see full reports for all specifics  6/9 CT of chest: No evidence of acute intrathoracic process.  New 6.1 x 4.1 cm right breast mass, inflammatory changes right axillary and right chest wall/subpectoral adenopathy attributed to malignancy and coby metastasis until proven otherwise. No evidence of intrathoracic metastatic disease.  2.5 x 2.2 cm left breast hypodense nodule enlarged since April 2019. Correlate with any previous work-up.  Moderate chronic compression fracture of T9, stable since March 2024 and new since April 2019.    6/9 CT of abd/pelvis: Cystitis, right ascending pyeloureteritis and mild right pyelonephritis. No abscess or hydronephrosis.  No evidence of abdominopelvic metastatic disease.  Colonic diverticulosis.  Moderate chronic compression fracture of L2, stable since March 2024.    Social/Education/Vocational Hx:  Pt [facility] at [facility]    Swallow Information   Current Diet: Dysphagia 3-Dental Soft; Thin Liquid; Consistent Carbohydrate Diet Level 2 (5 carb servings/75 grams CHO/meal), Sodium 2 GM   Baseline Diet: RAS CCD per [facility]      Baseline Assessment   Behavior/Cognition: alert  Speech/Language Status: able to follow commands and express basic needs. +accented speech (is Sic[place]-American, speaks English, Sicilian and Italian  Patient Positioning: upright in bed  Pain Status/Interventions/Response to Interventions:  No report of or nonverbal indications of pain.       Swallow Mechanism Exam  Facial: symmetrical  Labial: WFL  Lingual: WFL  Velum: unable to visualize  Mandible: adequate ROM  Dentition:  edentulous  Vocal quality:clear/adequate   Respiratory Status: on RA      Consistencies Assessed and Performance   Consistencies Administered: thin liquids, puree, mechanical soft solids, soft solids and small pills w/thin liquid  Materials administered included juice, applesauce, canned cubed peaches, scrambled eggs, small pills    Oral Stage: mild-moderate with eggs  Mastication appeared to be grossly adequate with peaches, ?incomplete w/eggs.  Bolus formation and transfer were functional with thin liquid, puree and min of cubed canned peaches but at least mod oral residue noted with eggs (did not clear when instructed to complete 2* transfer/swallow and did not clear with juice. (Improved clearance when alternated with puree). No overt s/s reduced oral control.    Pharyngeal Stage: WFL suspected with limtied materials today  Swallow Mechanics:  Swallowing initiation appeared prompt.  Laryngeal rise was palpated and judged to be within functional limits.  No throat clearing, change in vocal quality or respiratory status noted today.  Mild cough noted with scrambled eggs.    Esophageal Concerns: h/o GERD and recent vomiting    Strategies and Efficacy: alternate soft food with puree to aid clearance of food from oral cavity    Summary and Recommendations (see above)    Results Reviewed with: patient and RN     Treatment Recommended: yes     Frequency of treatment: 2-3x weekly to begin    Patient Stated Goal: did not state    Dysphagia LTG  -Patient will demonstrate safe and effective oral intake (without overt s/s significant oral/pharyngeal dysphagia including s/s penetration or aspiration) for the highest appropriate diet level.     Short Term Goals:  -Pt will tolerate puree and mechanical soft foods as well as thin liquid and small pills w/thin liquid with no significant s/s oral or pharyngeal dysphagia across 1-2 diagnostic session/s.    -Pt will tolerate Dysphagia 3/advanced (dental soft) diet items with no  significant s/s oral or pharyngeal dysphagia across 2-3 diagnostic session/s.    -If indicated/ordered, pt will comply with a Video/Modified Barium Swallow study for more complete assessment of swallowing anatomy/physiology/aspiration risk and to assess efficacy of treatment techniques so as to best guide treatment plan    -Patient will utilize trained compensatory strategies (eg, lingual sweep, use of puree/liquid to promote oral clearance) to reduce or eliminate s/s oral dysphagia with the least restrictive consistencies.    Thank you for this referral.  Please do not hesitate to contact me with any questions or concerns.    Melanie Garcia MS CCC-SLP  [place] License [id number]  [place] License [id number]  Available via [employer]

## 2024-06-10 NOTE — PLAN OF CARE
Problem: PHYSICAL THERAPY ADULT  Goal: Performs mobility at highest level of function for planned discharge setting.  See evaluation for individualized goals.  Description: Treatment/Interventions: Functional transfer training, LE strengthening/ROM, Therapeutic exercise, Endurance training, Bed mobility, Gait training          See flowsheet documentation for full assessment, interventions and recommendations.  Note: Prognosis: Good  Problem List: Decreased strength, Decreased range of motion, Decreased endurance, Impaired balance, Decreased mobility  Assessment: Pt is 89 y.o. female seen for PT evaluation s/p admit to [facility] on 6/9/2024 w/ Severe sepsis (HCC). PT consulted to assess pt's functional mobility and d/c needs. Order placed for PT eval and tx, w/ up and OOB as tolerated order.  Co-morbidities affecting patient's physical performance include: benign essential HTN, Type II DM, depression, dementia. Personal factors affecting patient at time of initial evaluation include: ambulating with assistive device, inability to ambulate household distances, inability to navigate community distances, depression, and inability to perform ADLS. Prior to admission, patient was independent with functional mobility with a RW, requiring assist for ADLS, and an assisted living resident.  Upon evaluation: Pt ambulated a total of 150 ft with a RW and min A with a seated rest break in between. Pt experienced minimal fatigue during and post ambulation.    Please find objective findings from Physical Therapy assessment regarding body systems outlined above with impairments and limitations including weakness, decreased ROM, impaired balance, decreased endurance, pain, decreased activity tolerance, decreased functional mobility tolerance, and fall risk.The Barthel Index was used as a functional outcome tool presenting with a score of Barthel Index Score: 45 today indicating marked limitations of functional mobility and  HF following  Appreciate recs, see below under acute resp failure ADLS.  Patient's clinical presentation is currently unstable/unpredictable as seen in patient's presentation of increased fall risk, new onset of impairment of functional mobility, decreased endurance, and new onset of weakness. Pt would benefit from continued Physical Therapy treatment to address deficits as defined above and maximize level of functional mobility.     As demonstrated by objective findings, the assigned level of complexity for this evaluation is high.The patient's AM-PAC Basic Mobility Inpatient Short Form Raw Score is 16. A Raw score of less than or equal to 16 suggests the patient may benefit from discharge to post-acute rehabilitation services. Please also refer to the recommendation of the Physical Therapist for safe discharge planning.        Rehab Resource Intensity Level, PT: II (Moderate Resource Intensity)    See flowsheet documentation for full assessment.

## 2024-06-10 NOTE — ASSESSMENT & PLAN NOTE
Lab Results   Component Value Date    HGBA1C 6.6 (H) 05/22/2019       Recent Labs     06/10/24  0022   POCGLU 176*       Blood Sugar Average: Last 72 hrs:  (P) 176    On Lantus 15 units subcu daily, metformin in nursing home.  Continue Lantus 15 units subcu daily in the morning  Hold metformin while inpatient  SSI  Diabetic diet  Check A1c

## 2024-06-10 NOTE — PROGRESS NOTES
[facility]  Progress Note  Name: Sabrina Barclay I  MRN: [id number]  Unit/Bed#: [address] [address]  Date of Admission: 6/9/2024   Date of Service: 6/10/2024  Hospital Day: 1    Assessment & Plan   Constipation  Assessment & Plan  Bowel regimen ordered.    GERD (gastroesophageal reflux disease)  Assessment & Plan  Continue PPI    CKD (chronic kidney disease) stage 3, GFR 30-59 ml/min ([facility])  Assessment & Plan  Baseline creatinine appears to be around 1.1  Creatinine stable  Monitor    Dementia ([facility])  Assessment & Plan  Continue Aricept Namenda     Wound of right breast  Assessment & Plan  Appears to be chronic.  No evidence of infection to me on exam.  CT concerning for breast cancer.  Wound care in NH - Crush 1 tablet of Flagyl  250mg and apply powder to right breast wound bed and cover with Optifoam every 3 days and as needed soiled/saturated.  Will consult wound care  Follow-up on wound culture    6/10 - CT imaging with a new 6.1 x 4.1 cm right breast mass, inflammatory changes right axillary and right chest wall/subpectoral adenopathy attributed to malignancy and coby metastasis until proven otherwise. No evidence of intrathoracic metastatic disease. Will ask oncology to evaluate.    UTI (urinary tract infection)  Assessment & Plan  IV Zosyn as above  Urine culture in March 2024 grew E. coli and Aerococcus urinae, pan susceptible.  Follow-up urine culture  Straight cath in ED for specimen obtained 60 cc of urine.      Depression  Assessment & Plan  Continue Paxil and Remeron    Type 2 diabetes mellitus, with long-term current use of insulin ([facility])  Assessment & Plan  Lab Results   Component Value Date    HGBA1C 6.2 (H) 06/09/2024       Recent Labs     06/10/24  0022 06/10/24  0756 06/10/24  1108   POCGLU 176* 115 119         Blood Sugar Average: Last 72 hrs:  (P) 136.4799315667168407    On Lantus 15 units subcu daily, metformin in nursing home.  Continue Lantus 15 units subcu daily in the  morning  Hold metformin while inpatient  SSI  Diabetic diet  Check A1c    Benign essential hypertension  Assessment & Plan  Continue losartan with holding parameter  BP stable    * Severe sepsis (HCC)  Assessment & Plan  Patient presents with vomiting after dinner, then started with chills, fever 100.1, pulse rate 121 from nursing home per transfer paper.  Has chronic right breast wound.  Sepsis, present on admission, as evidenced by WBC 13, tachycardia, tachypnea, with suspected source of infection UTI.  Right breast wound appears no evidence of infection on exam.  CT prelim report concerning for breast cancer.  UA showed large leukocytes, innumerable WBC  Initial lactic acid 3.9.  Initial procalcitonin 0.51  Given Zosyn in ED. Will continue.  Follow-up official report of CT chest abdomen pelvis  Follow-up urine culture, blood cultures, wound culture  Trend lactic acid until less than 2  Repeat CBC with differential, procalcitonin in the morning           VTE Pharmacologic Prophylaxis:   Lovenox prophylaxis    Mobility:   Basic Mobility Inpatient Raw Score: 8  [facility] Goal: 3: Sit at edge of bed  -[facility] Achieved: 2: Bed activities/Dependent transfer    Patient Centered Rounds: I performed bedside rounds with nursing staff today.     Total Time Spent on Date of Encounter in care of patient: 30 mins. This time was spent on one or more of the following: performing physical exam; counseling and coordination of care; obtaining or reviewing history; documenting in the medical record; reviewing/ordering tests, medications or procedures; communicating with other healthcare professionals and discussing with patient's family/caregivers.    Current Length of Stay: 1 day(s)  Current Patient Status: Inpatient   Certification Statement: The patient will continue to require additional inpatient hospital stay due to above.  Discharge Plan: TBD    Code Status: Level 3 - DNAR and DNI    Subjective:   Patient was seen and examined at  bedside.  No acute events overnight.  Patient with constipation this morning.    Objective:     Vitals:   Temp (24hrs), Av.3 °F (37.4 °C), Min:98.1 °F (36.7 °C), Max:99.9 °F (37.7 °C)    Temp:  [98.1 °F (36.7 °C)-99.9 °F (37.7 °C)] 98.1 °F (36.7 °C)  HR:  [] 70  Resp:  [16-23] 16  BP: (106-159)/(55-98) 106/55  SpO2:  [92 %-97 %] 96 %  Body mass index is 24.45 kg/m².     Input and Output Summary (last 24 hours):     Intake/Output Summary (Last 24 hours) at 6/10/2024 1414  Last data filed at 2024 2215  Gross per 24 hour   Intake 1000 ml   Output 60 ml   Net 940 ml     Physical Exam:   Physical Exam  HENT:      Head: Normocephalic.      Mouth/Throat:      Mouth: Mucous membranes are moist.   Eyes:      Extraocular Movements: Extraocular movements intact.   Cardiovascular:      Rate and Rhythm: Normal rate.   Pulmonary:      Effort: Pulmonary effort is normal.      Comments: Diminished in the bases  Abdominal:      Palpations: Abdomen is soft.      Tenderness: There is no abdominal tenderness.   Skin:     Comments: Right breast chronic ulcer with no active drainage and no surrounding redness.    Neurological:      Mental Status: She is alert.      Comments: Dementia   Psychiatric:         Mood and Affect: Mood normal.       Additional Data:     Labs:  Results from last 7 days   Lab Units 06/10/24  0745 24   WBC Thousand/uL 11.00* 13.04*   HEMOGLOBIN g/dL 10.0* 11.7   HEMATOCRIT % 31.4* 36.4   PLATELETS Thousands/uL 230 257   BANDS PCT %  --  6   LYMPHO PCT %  --  4*   MONO PCT %  --  4   EOS PCT %  --  0     Results from last 7 days   Lab Units 06/10/24  0156 24   SODIUM mmol/L 135 137   POTASSIUM mmol/L 3.8 3.8   CHLORIDE mmol/L 104 103   CO2 mmol/L 21 20*   BUN mg/dL 13 18   CREATININE mg/dL 0.93 1.02   ANION GAP mmol/L 10 14*   CALCIUM mg/dL 7.8* 9.2   ALBUMIN g/dL  --  3.8   TOTAL BILIRUBIN mg/dL  --  0.33   ALK PHOS U/L  --  75   ALT U/L  --  9   AST U/L  --  14   GLUCOSE  RANDOM mg/dL 130 208*     Results from last 7 days   Lab Units 06/09/24 1952   INR  1.03     Results from last 7 days   Lab Units 06/10/24  1108 06/10/24  0756 06/10/24  0022   POC GLUCOSE mg/dl 119 115 176*     Results from last 7 days   Lab Units 06/09/24 1952   HEMOGLOBIN A1C % 6.2*     Results from last 7 days   Lab Units 06/10/24  0505 06/10/24  0152 06/10/24  0147 06/09/24  2148 06/09/24 1952   LACTIC ACID mmol/L 1.6 2.1*  --  2.9* 3.9*   PROCALCITONIN ng/ml  --   --  2.01*  --  0.51*     Lines/Drains:  Invasive Devices       Peripheral Intravenous Line  Duration             Peripheral IV 06/09/24 Right;Ventral (anterior) Forearm <1 day                  Recent Cultures (last 7 days):   Results from last 7 days   Lab Units 06/09/24  2108 06/09/24 1952   BLOOD CULTURE   --  Received in Microbiology Lab. Culture in Progress.  Received in Microbiology Lab. Culture in Progress.   GRAM STAIN RESULT  No polys seen*  2+ Gram positive rods*  --      Last 24 Hours Medication List:   Current Facility-Administered Medications   Medication Dose Route Frequency Provider Last Rate    acetaminophen  650 mg Oral BID MADDISON Dong      acetaminophen  650 mg Oral Q8H PRN MADDISON Dong      Artificial Tears  1 drop Both Eyes BID MADDISON Dong      cholecalciferol  1,000 Units Oral Daily MADDISON Dong      Diclofenac Sodium  2 g Topical TID PRN MADDISON Dong      docusate sodium  100 mg Oral BID Gildardo Gunderson MD      donepezil  5 mg Oral HS MADDISON Dong      enoxaparin  30 mg Subcutaneous Daily MADDISON Dong      insulin glargine  15 Units Subcutaneous QAM MADDISON Dong      insulin lispro  1-5 Units Subcutaneous TID AC MADDISON Dong      insulin lispro  1-5 Units Subcutaneous HS MADDISON Dong      lactated ringers  50 mL/hr Intravenous Continuous MADDISON Dong 50 mL/hr (06/10/24 0030)    loperamide  2 mg Oral Daily MADDISON Dong      losartan  50 mg Oral  Daily Cuiyin Yurik, CRNP      magnesium Oxide  400 mg Oral Daily Cuiyin Yurik, CRNP      melatonin  4.5 mg Oral HS Cuiyin Yurik, CRNP      memantine  5 mg Oral BID Cuiyin Yurik, CRNP      mirtazapine  15 mg Oral HS Cuiyin Yurik, CRNP      oxybutynin  10 mg Oral Daily Cuiyin Yurik, CRNP      pantoprazole  40 mg Oral Early Morning Cuiyin Yurik, CRNP      PARoxetine  40 mg Oral QAM Cuiyin Yurik, CRNP      piperacillin-tazobactam  4.5 g Intravenous Q8H Cuiyin Yurik, CRNP 4.5 g (06/10/24 0847)    polyethylene glycol  17 g Oral Daily PRN Gildardo Gunderson MD      saccharomyces boulardii  250 mg Oral BID Cuiyin Yurik, CRNP      traMADol  25 mg Oral Q8H PRN Cuinicholas Abadrik, CRNP       Today, Patient Was Seen By: Gildardo Gunderson MD    **Please Note: This note may have been constructed using a voice recognition system.**

## 2024-06-10 NOTE — PHYSICAL THERAPY NOTE
PHYSICAL THERAPY EVALUATION/TREATMENT         06/10/24 1335   PT Last Visit   PT Visit Date 06/10/24   Note Type   Note type Evaluation   Pain Assessment   Pain Assessment Tool 0-10   Pain Score No Pain   Restrictions/Precautions   Other Precautions Chair Alarm;Bed Alarm;Fall Risk;Hard of hearing   Home Living   Type of Home Assisted living   Home Equipment Walker;Wheelchair-manual  (Pt ambulates using a RW)   Prior Function   Level of [place] Independent with functional mobility;Needs assistance with ADLs  (Pt reports she receives help with bathing and uses the toilet independently)   Lives With Facility staff   Receives Help From Other (Comment)  (Facility staff)   Falls in the last 6 months 0   Vocational Retired   General   Additional Pertinent History Pt presented to ED with vomiting, chills, 100.1 fever, and pulse rate 121. Pt resides at an assisted living facility.   Cognition   Overall Cognitive Status Impaired   Arousal/Participation Cooperative   Following Commands Follows one step commands with increased time or repetition   Subjective   Subjective Pt agreeable to PT session this afternoon   RLE Assessment   RLE Assessment   (ROM WFL. MMT hip 3+/5, knee 3+/5, ankle 3+/5)   LLE Assessment   LLE Assessment   (ROM WFL. MMT hip 3+/5, knee 3+/5, ankle 3+/5)   Bed Mobility   Sit to Supine 5  Supervision   Additional items HOB elevated;Increased time required;Verbal cues   Transfers   Sit to Stand 4  Minimal assistance   Additional items Assist x 1;Increased time required;Verbal cues   Stand to Sit 4  Minimal assistance   Additional items Assist x 1;Increased time required;Verbal cues   Ambulation/Elevation   Gait pattern Forward Flexion;Short stride  (Decreased gait speed)   Gait Assistance 4  Minimal assist   Additional items Assist x 1   Assistive Device Rolling walker   Distance 75 ft   Balance   Static Sitting Fair   Static Standing Fair   Ambulatory Fair   Activity Tolerance   Activity  Tolerance Patient tolerated treatment well   Nurse Made Aware Spoke with RN prior to working with pt   Assessment   Prognosis Good   Problem List Decreased strength;Decreased range of motion;Decreased endurance;Impaired balance;Decreased mobility   Assessment Pt is 89 y.o. female seen for PT evaluation s/p admit to [facility] on 6/9/2024 w/ Severe sepsis (HCC). PT consulted to assess pt's functional mobility and d/c needs. Order placed for PT eval and tx, w/ up and OOB as tolerated order.  Co-morbidities affecting patient's physical performance include: benign essential HTN, Type II DM, depression, dementia. Personal factors affecting patient at time of initial evaluation include: ambulating with assistive device, inability to ambulate household distances, inability to navigate community distances, depression, and inability to perform ADLS. Prior to admission, patient was independent with functional mobility with a RW, requiring assist for ADLS, and an assisted living resident.  Upon evaluation: Pt ambulated a total of 150 ft with a RW and min A with a seated rest break in between. Pt experienced minimal fatigue during and post ambulation.    Please find objective findings from Physical Therapy assessment regarding body systems outlined above with impairments and limitations including weakness, decreased ROM, impaired balance, decreased endurance, pain, decreased activity tolerance, decreased functional mobility tolerance, and fall risk.The Barthel Index was used as a functional outcome tool presenting with a score of Barthel Index Score: 45 today indicating marked limitations of functional mobility and ADLS.  Patient's clinical presentation is currently unstable/unpredictable as seen in patient's presentation of increased fall risk, new onset of impairment of functional mobility, decreased endurance, and new onset of weakness. Pt would benefit from continued Physical Therapy treatment to address deficits as  defined above and maximize level of functional mobility.     As demonstrated by objective findings, the assigned level of complexity for this evaluation is high.The patient's AM-PAC Basic Mobility Inpatient Short Form Raw Score is 16. A Raw score of less than or equal to 16 suggests the patient may benefit from discharge to post-acute rehabilitation services. Please also refer to the recommendation of the Physical Therapist for safe discharge planning.   Goals   Patient Goals To get better   STG Expiration Date 06/17/24   Short Term Goal #1 1. Pt will be independent with bed mobility,   2. Pt will be independent with transfers,   3. Pt will ambulate at least 50 ft with a RW independently on indoor surfaces   LTG Expiration Date 06/24/24   Long Term Goal #1 1. Pt will improve AM-PAC score to >20/24,   2. Pt will ambulate at least 100 ft with a RW independently on indoor surfaces,   3. Pt will improve ambulatory balance to at least fair+ to decrease risk of falls   Plan   Treatment/Interventions Functional transfer training;LE strengthening/ROM;Therapeutic exercise;Endurance training;Bed mobility;Gait training   PT Frequency Other (Comment)  (5x/week)   Discharge Recommendation   Rehab Resource Intensity Level, PT II (Moderate Resource Intensity)   AM-PAC Basic Mobility Inpatient   Turning in Flat Bed Without Bedrails 3   Lying on Back to Sitting on Edge of Flat Bed Without Bedrails 3   Moving Bed to Chair 3   Standing Up From Chair Using Arms 3   Walk in Room 3   Climb 3-5 Stairs With Railing 1   Basic Mobility Inpatient Raw Score 16   Basic Mobility Standardized Score 38.32   [facility] Highest Level Of Mobility   -HLM Goal 5: Stand one or more mins   -HL Achieved 7: Walk 25 feet or more   Barthel Index   Feeding 10   Bathing 0   Grooming Score 0   Dressing Score 5   Bladder Score 5   Bowels Score 10   Toilet Use Score 5   Transfers (Bed/Chair) Score 10   Mobility (Level Surface) Score 0   Stairs Score 0    Barthel Index Score 45   Additional Treatment Session   Start Time 1325   End Time 1335   Treatment Assessment S: Pt agreeable to take an additional walk.   O: Pt ambulated another 75 ft with a RW and min A. Pt sat in bedside chair for ~1 min after walk before requesting to be returned to bed since she was sleepy. Pt required min A to transfer from bedside chair to the bed.   A: Pt experienced minimal fatigue during and post ambulation.   P: Plan to sit OOB after next PT session.   End of Consult   Patient Position at End of Consult Supine;Bed/Chair alarm activated;All needs within reach   Licensure   [place] License Number  Thelma Heard, SPT

## 2024-06-10 NOTE — H&P
[facility]  H&P  Name: Sabrina Barclay 89 y.o. female I MRN: [id number]  Unit/Bed#: [address] [place] [address] Date of Admission: 6/9/2024   Date of Service: 6/10/2024 I Hospital Day: 1      Assessment & Plan   * Severe sepsis (HCC)  Assessment & Plan  Patient presents with vomiting after dinner, then started with chills, fever 100.1, pulse rate 121 from nursing home per transfer paper.  Has chronic right breast wound.  Sepsis, present on admission, as evidenced by WBC 13, tachycardia, tachypnea, with suspected source of infection UTI.  Right breast wound appears no evidence of infection on exam.  CT prelim report concerning for breast cancer.  UA showed large leukocytes, innumerable WBC  Initial lactic acid 3.9.  Initial procalcitonin 0.51  Given Zosyn in ED. Will continue.  Follow-up official report of CT chest abdomen pelvis  Follow-up urine culture, blood cultures, wound culture  Trend lactic acid until less than 2  Repeat CBC with differential, procalcitonin in the morning    UTI (urinary tract infection)  Assessment & Plan  IV Zosyn as above  Urine culture in March 2024 grew E. coli and Aerococcus urinae, pan susceptible.  Follow-up urine culture  Straight cath in ED for specimen obtained 60 cc of urine.      Wound of right breast  Assessment & Plan  Appears to be chronic.  No evidence of infection to me on exam.  CT concerning for breast cancer.  Wound care in NH - Crush 1 tablet of Flagyl  250mg and apply powder to right breast wound bed and cover with Optifoam every 3 days and as needed soiled/saturated.  Will consult wound care  Follow-up on wound culture    GERD (gastroesophageal reflux disease)  Assessment & Plan  Continue PPI    CKD (chronic kidney disease) stage 3, GFR 30-59 ml/min ([facility])  Assessment & Plan  Baseline creatinine appears to be around 1.1  Creatinine stable  Monitor    Dementia ([facility])  Assessment & Plan  Continue Aricept Namenda     Depression  Assessment & Plan  Continue Paxil  and Remeron    Type 2 diabetes mellitus, with long-term current use of insulin ([facility])  Assessment & Plan  Lab Results   Component Value Date    HGBA1C 6.6 (H) 05/22/2019       Recent Labs     06/10/24  0022   POCGLU 176*       Blood Sugar Average: Last 72 hrs:  (P) 176    On Lantus 15 units subcu daily, metformin in nursing home.  Continue Lantus 15 units subcu daily in the morning  Hold metformin while inpatient  SSI  Diabetic diet  Check A1c    Benign essential hypertension  Assessment & Plan  Continue losartan with holding parameter  BP stable             VTE Prophylaxis: Enoxaparin (Lovenox)  / reason for no mechanical VTE prophylaxis on Lovenox    Code Status: DNR/DNI  POLST: POLST form is not discussed and not completed at this time.    Anticipated Length of Stay:  Patient will be admitted on an Inpatient basis with an anticipated length of stay of  > 2 midnights.   Justification for Hospital Stay: Severe sepsis UTI    Total Time for Visit, including Counseling / Coordination of Care: 45 minutes.  Greater than 50% of this total time spent on direct patient counseling and coordination of care.    Chief Complaint:   Vomiting chills fever    History of Present Illness:    Sabrina Barclay is a 89 y.o. female with PMH of dementia, depression, CKD 3, hypertension, type 2 diabetes, GERD who presents with vomiting after dinner, then started with chills, fever 100.1, pulse rate 121 from nursing home per transfer paper.  Has chronic right breast wound.  Patient denies any pain or discomfort on exam.  Poor historian due to dementia.  Reviewed nursing home paper and ED note.        Review of Systems:    Review of Systems   Unable to perform ROS: Dementia (Reviewed nursing home paper)   Constitutional:  Positive for chills and fever.   Gastrointestinal:  Positive for nausea and vomiting.   Skin:  Positive for wound.   All other systems reviewed and are negative.      Past Medical and Surgical History:     Past Medical History:    Diagnosis Date    Anxiety     CKD (chronic kidney disease) stage 3, GFR 30-59 ml/min ([facility])     Dementia ([facility])     Depression     Diabetes mellitus ([facility])     Gait difficulty     GERD (gastroesophageal reflux disease)     Hyperlipidemia     Hypertension     Incontinence of urine     Macula lutea degeneration     Neurocognitive disorder     Osteoarthritis        Past Surgical History:   Procedure Laterality Date    APPENDECTOMY      HYSTERECTOMY      ORIF SHOULDER DISLOCATION W/ HUMERAL FRACTURE         Meds/Allergies:    Prior to Admission medications    Medication Sig Start Date End Date Taking? Authorizing Provider   acetaminophen (TYLENOL) 325 mg tablet Take 650 mg by mouth 2 (two) times a day   Yes Historical Provider, MD   cholecalciferol (VITAMIN D3) 400 units tablet Take 1,000 Units by mouth daily   Yes Historical Provider, MD   cycloSPORINE (RESTASIS) 0.05 % ophthalmic emulsion Administer 1 drop to both eyes 2 (two) times a day.   Yes Historical Provider, MD   Diclofenac Sodium (VOLTAREN) 1 % Apply 2 g topically 3 (three) times a day as needed (TO RIGHT KNEE)   Yes Historical Provider, MD   donepezil (ARICEPT) 5 mg tablet Take 5 mg by mouth daily at bedtime   Yes Historical Provider, MD   Fesoterodine Fumarate ER 8 MG TB24 Take 8 mg by mouth in the morning   Yes Historical Provider, MD   Insulin Glargine Solostar (Basaglar KwikPen) 100 UNIT/ML SOPN Inject 15 Units under the skin in the morning   Yes Historical Provider, MD   loperamide (IMODIUM) 2 mg capsule Take 2 mg by mouth in the morning   Yes Historical Provider, MD   losartan (COZAAR) 50 mg tablet Take 50 mg by mouth daily.   Yes Historical Provider, MD   MAGNESIUM OXIDE 400 PO Take 400 mg by mouth in the morning   Yes Historical Provider, MD   Melatonin 3-10 MG TABS Take 5 mg by mouth daily at bedtime   Yes Historical Provider, MD   memantine (NAMENDA) 5 mg tablet Take 5 mg by mouth 2 (two) times a day   Yes Historical Provider, MD   metFORMIN  "(GLUCOPHAGE) 500 mg tablet Take 500 mg by mouth daily with breakfast   Yes Historical Provider, MD   metroNIDAZOLE (FLAGYL) 250 mg tablet 250 mg Crush 1 tablet and apply powder to right breast wound bed and cover with Optifoam as needed soiled/saturated and every 3 days.   Yes Historical Provider, MD   mirtazapine (Remeron) 15 mg tablet Take 15 mg by mouth daily at bedtime   Yes Historical Provider, MD   pantoprazole (PROTONIX) 40 mg tablet Take 40 mg by mouth daily.   Yes Historical Provider, MD   PARoxetine (PAXIL) 30 mg tablet Take 40 mg by mouth every morning     Yes Historical Provider, MD   saccharomyces boulardii (FLORASTOR) 250 mg capsule Take 250 mg by mouth 2 (two) times a day   Yes Historical Provider, MD   traMADol (ULTRAM) 50 mg tablet Take 25 mg by mouth every 8 (eight) hours as needed for moderate pain or severe pain   Yes Historical Provider, MD   acetaminophen (TYLENOL) 500 mg tablet Take 1,000 mg by mouth every 6 (six) hours as needed for mild pain     Historical Provider, MD     I have reveiwed home medications using records provided by [facility].    Allergies: No Known Allergies    Social History:     Marital Status:    Occupation: Retired  Patient Pre-hospital Living Situation: Nursing home  Patient Pre-hospital Level of Mobility: With assist  Patient Pre-hospital Diet Restrictions: Diabetic cardiac  Substance Use History:   Social History     Substance and Sexual Activity   Alcohol Use Not Currently     Social History     Tobacco Use   Smoking Status Never   Smokeless Tobacco Never     Social History     Substance and Sexual Activity   Drug Use Not Currently       Family History:    non-contributory    Physical Exam:     Vitals:   Blood Pressure: 143/84 (06/10/24 0008)  Pulse: 105 (06/10/24 0008)  Temperature: 99.6 °F (37.6 °C) (06/10/24 0008)  Temp Source: Oral (06/10/24 0008)  Respirations: 19 (06/10/24 0008)  Height: 5' 4\" (162.6 cm) (06/10/24 0008)  Weight - Scale: 64.6 kg (142 lb 6.7 oz) " (06/10/24 0008)  SpO2: 95 % (06/09/24 6636)    Physical Exam  Vitals and nursing note reviewed.   Constitutional:       Appearance: She is well-developed.   HENT:      Head: Normocephalic and atraumatic.   Neck:      Thyroid: No thyromegaly.      Vascular: No JVD.      Trachea: No tracheal deviation.   Cardiovascular:      Rate and Rhythm: Normal rate and regular rhythm.      Heart sounds: Normal heart sounds.   Pulmonary:      Effort: Pulmonary effort is normal. No respiratory distress.      Breath sounds: No wheezing or rales.      Comments: Breath sounds diminished bilateral, on room air, respiration easy  Abdominal:      General: Bowel sounds are normal. There is no distension.      Palpations: Abdomen is soft.      Tenderness: There is no abdominal tenderness. There is no guarding.   Musculoskeletal:      Cervical back: Neck supple.      Right lower leg: No edema.      Left lower leg: No edema.      Comments: Right breast chronic ulcer, no active drainage, no surrounding redness.   Skin:     General: Skin is warm and dry.   Neurological:      General: No focal deficit present.      Mental Status: She is alert.      Comments: Patient awake alert, oriented to person only, follows simple commands   Psychiatric:         Mood and Affect: Mood and affect and mood normal.      Comments: Patient tearful during exam, emotional.         Additional Data:     Lab Results: I have personally reviewed pertinent reports.      Results from last 7 days   Lab Units 06/09/24 1952   WBC Thousand/uL 13.04*   HEMOGLOBIN g/dL 11.7   HEMATOCRIT % 36.4   PLATELETS Thousands/uL 257   LYMPHO PCT % 4*   MONO PCT % 4   EOS PCT % 0     Results from last 7 days   Lab Units 06/10/24  0156 06/09/24 1952   POTASSIUM mmol/L 3.8 3.8   CHLORIDE mmol/L 104 103   CO2 mmol/L 21 20*   BUN mg/dL 13 18   CREATININE mg/dL 0.93 1.02   CALCIUM mg/dL 7.8* 9.2   ALK PHOS U/L  --  75   ALT U/L  --  9   AST U/L  --  14     Results from last 7 days   Lab Units  06/09/24 1952   INR  1.03       Imaging: I have personally reviewed pertinent reports.      No results found.    EKG, Pathology, and Other Studies Reviewed on Admission:   EKG: SR    [employer] Records Reviewed: Yes     ** Please Note: Dragon 360 Dictation voice to text software may have been used in the creation of this document. **

## 2024-06-10 NOTE — ASSESSMENT & PLAN NOTE
Appears to be chronic.  No evidence of infection to me on exam.  CT concerning for breast cancer.  Wound care in NH - Crush 1 tablet of Flagyl  250mg and apply powder to right breast wound bed and cover with Optifoam every 3 days and as needed soiled/saturated.  Will consult wound care  Follow-up on wound culture

## 2024-06-10 NOTE — PLAN OF CARE
Pt presented with functional appearing pharyngeal stage swallowing skills but s/s moderate oral dysphagia with materials administered today.    Recommended Diet: mechanically altered/level 2 diet (to begin) and thin liquids   Recommended Form of Meds: OK for small pills whole w/liqudi (please cut/crush large pills)  Swallowing strategies: lingual sweep and alternate texture food with puree to assist with clearing oral cavity of food  Other Recommendations: Continue oral care after all meals (r/o food retention in mouth after all meals)

## 2024-06-10 NOTE — ASSESSMENT & PLAN NOTE
Appears to be chronic.  No evidence of infection to me on exam.  CT concerning for breast cancer.  Wound care in NH - Crush 1 tablet of Flagyl  250mg and apply powder to right breast wound bed and cover with Optifoam every 3 days and as needed soiled/saturated.  Will consult wound care  Follow-up on wound culture    6/10 - CT imaging with a new 6.1 x 4.1 cm right breast mass, inflammatory changes right axillary and right chest wall/subpectoral adenopathy attributed to malignancy and coby metastasis until proven otherwise. No evidence of intrathoracic metastatic disease. Will ask oncology to evaluate.

## 2024-06-10 NOTE — OCCUPATIONAL THERAPY NOTE
OT EVALUATION       06/10/24 1350   OT Last Visit   OT Visit Date 06/10/24   Note Type   Note type Evaluation   Pain Assessment   Pain Assessment Tool 0-10   Pain Score No Pain   Restrictions/Precautions   Other Precautions Cognitive;Chair Alarm;Bed Alarm;Fall Risk   Home Living   Type of Home Assisted living  ([facility])   Bathroom Shower/Tub Walk-in shower   Bathroom Equipment Shower chair   Home Equipment Walker;Wheelchair-manual   Prior Function   Level of [place] Independent with functional mobility;Needs assistance with ADLs;Independent with IADLS   Lives With Facility staff   Receives Help From Personal care attendant   Comments pt reports she sometimes goes to the bathroom on her own   ADL   Eating Assistance 5  Supervision/Setup   Grooming Assistance 5  Supervision/Setup   UB Bathing Assistance 4  Minimal Assistance   LB Bathing Assistance 3  Moderate Assistance   UB Dressing Assistance 4  Minimal Assistance   LB Dressing Assistance 3  Moderate Assistance   Toileting Assistance  4  Minimal Assistance   Bed Mobility   Supine to Sit 4  Minimal assistance   Sit to Supine 4  Minimal assistance   Transfers   Sit to Stand 4  Minimal assistance   Additional items Assist x 1;Verbal cues   Stand to Sit 4  Minimal assistance   Additional items Assist x 1;Verbal cues   Functional Mobility   Functional Mobility 4  Minimal assistance   Additional Comments few steps with RW   Balance   Static Sitting Fair   Dynamic Sitting Fair   Static Standing Fair -   Dynamic Standing Fair -   Activity Tolerance   Activity Tolerance Patient limited by fatigue   Nurse Made Aware yes, Jessica   RUE Assessment   RUE Assessment WFL   LUE Assessment   LUE Assessment WFL  (shoulder AROM 90 degrees)   Cognition   Overall Cognitive Status Impaired   Arousal/Participation Cooperative   Attention Attends with cues to redirect   Orientation Level Oriented to person;Disoriented to place;Disoriented to time;Disoriented to situation   Following  Commands Follows one step commands with increased time or repetition   Assessment   Limitation Decreased ADL status;Decreased UE strength;Decreased Safe judgement during ADL;Decreased endurance;Decreased cognition;Decreased high-level ADLs;Decreased self-care trans  (decreased balance and mobility)   Prognosis Good   Assessment Patient evaluated by Occupational Therapy.  Patient admitted with Severe sepsis (HCC).  The patients occupational profile, medical and therapy history includes a extensive additional review of physical, cognitive, or psychosocial history related to current functional performance.  Comorbidities affecting functional mobility and ADLS include: anxiety, arthritis, CKD, dementia, depression, diabetes, hypertension, and macular degeneration.  Prior to admission, patient was independent with functional mobility with walker, requiring assist for ADLS, and requiring assist for IADLS.  The evaluation identifies the following performance deficits: weakness, impaired balance, decreased endurance, increased fall risk, new onset of impairment of functional mobility, decreased ADLS, decreased IADLS, decreased activity tolerance, decreased safety awareness, impaired judgement, decreased cognition, and decreased strength, that result in activity limitations and/or participation restrictions. This evaluation requires clinical decision making of high complexity, because the patient presents with comorbidites that affect occupational performance and required significant modification of tasks or assistance with consideration of multiple treatment options.  The Barthel Index was used as a functional outcome tool presenting with a score of Barthel Index Score: 45, indicating marked limitations of functional mobility and ADLS.  The patient's raw score on the -PAC Daily Activity Inpatient Short Form is 17. A raw score of less than 19 suggests the patient may benefit from discharge to post-acute rehabilitation  services, however pt required assistance at baseline for ADLS recommend return to previous living environment. Please refer to the recommendation of the Occupational Therapist for safe discharge planning.  Patient will benefit from skilled Occupational Therapy services to address above deficits and facilitate a safe return to prior level of function.   Goals   Patient Goals to get better   STG Time Frame   (1-7 days)   Short Term Goal  Goals established to promote Patient Goals: to get better:  Grooming: independent seated; Bathing: min assist; Upper Body Dressing supervision; Lower Body Dressing: min assist; Toileting: supervision; Patient will increase ambulatory standard toilet transfer to supervision with rolling walker to increase performance and safety with ADLS and functional mobility; Patient will increase standing tolerance to 3 minutes during ADL task to decrease assistance level and decrease fall risk; Patient will increase bed mobility to supervision in preparation for ADLS and transfers; Patient will increase functional mobility to and from bathroom with rolling walker with supervision to increase performance with ADLS and to use a toilet; Patient will tolerate 5 minutes of UE ROM/strengthening to increase general activity tolerance and performance in ADLS/IADLS; Patient will improve functional activity tolerance to 10 minutes of sustained functional tasks to increase participation in basic self-care and decrease assistance level;  Patient will increase dynamic standing balance to fair to improve postural stability and decrease fall risk during standing ADLS and transfers.   LTG Time Frame   (8-14 days)   Long Term Goal Bathing: supervision; Upper Body Dressing independent; Lower Body Dressing: supervision; Toileting: independent; Patient will increase ambulatory standard toilet transfer to independent with rolling walker to increase performance and safety with ADLS and functional mobility; Patient will  increase standing tolerance to 6 minutes during ADL task to decrease assistance level and decrease fall risk; Patient will increase bed mobility to independent in preparation for ADLS and transfers; Patient will increase functional mobility to and from bathroom with rolling walker independently to increase performance with ADLS and to use a toilet; Patient will tolerate 10 minutes of UE ROM/strengthening to increase general activity tolerance and performance in ADLS/IADLS; Patient will improve functional activity tolerance to 20 minutes of sustained functional tasks to increase participation in basic self-care and decrease assistance level;  Patient will increase dynamic standing balance to fair+ to improve postural stability and decrease fall risk during standing ADLS and transfers.   Pt will score >/= 21/24 on AM-PAC Daily Activity Inpatient scale to promote safe independence with ADLs and functional mobility; Pt will score >/= 75/100 on Barthel Index in order to decrease caregiver assistance needed and increase ability to perform ADLs and functional mobility.   Plan   Treatment Interventions ADL retraining;Functional transfer training;UE strengthening/ROM;Endurance training;Patient/family training;Equipment evaluation/education;Activityengagement;Compensatory technique education;Cognitive reorientation   Goal Expiration Date 06/24/24   OT Frequency 3-5x/wk   Discharge Recommendation   Rehab Resource Intensity Level, OT III (Minimum Resource Intensity)   AM-PAC Daily Activity Inpatient   Lower Body Dressing 2   Bathing 2   Toileting 3   Upper Body Dressing 3   Grooming 3   Eating 4   Daily Activity Raw Score 17   Daily Activity Standardized Score (Calc for Raw Score >=11) 37.26   AM-PAC Applied Cognition Inpatient   Following a Speech/Presentation 2   Understanding Ordinary Conversation 4   Taking Medications 1   Remembering Where Things Are Placed or Put Away 1   Remembering List of 4-5 Errands 1   Taking Care of  Complicated Tasks 1   Applied Cognition Raw Score 10   Applied Cognition Standardized Score 24.98   Barthel Index   Feeding 10   Bathing 0   Grooming Score 0   Dressing Score 5   Bladder Score 5   Bowels Score 10   Toilet Use Score 5   Transfers (Bed/Chair) Score 10   Mobility (Level Surface) Score 0   Stairs Score 0   Barthel Index Score 45   Licensure   [place] License Number  Giovana Wise MS OTR/L [id number]

## 2024-06-10 NOTE — ASSESSMENT & PLAN NOTE
Lab Results   Component Value Date    HGBA1C 6.2 (H) 06/09/2024       Recent Labs     06/10/24  0022 06/10/24  0756 06/10/24  1108   POCGLU 176* 115 119         Blood Sugar Average: Last 72 hrs:  (P) 136.7421254551812535    On Lantus 15 units subcu daily, metformin in nursing home.  Continue Lantus 15 units subcu daily in the morning  Hold metformin while inpatient  SSI  Diabetic diet  Check A1c

## 2024-06-11 LAB
ANION GAP SERPL CALCULATED.3IONS-SCNC: 8 MMOL/L (ref 4–13)
BASOPHILS # BLD AUTO: 0.03 THOUSANDS/ÂΜL (ref 0–0.1)
BASOPHILS NFR BLD AUTO: 1 % (ref 0–1)
BUN SERPL-MCNC: 11 MG/DL (ref 5–25)
CALCIUM SERPL-MCNC: 8.6 MG/DL (ref 8.4–10.2)
CHLORIDE SERPL-SCNC: 106 MMOL/L (ref 96–108)
CO2 SERPL-SCNC: 23 MMOL/L (ref 21–32)
CREAT SERPL-MCNC: 1.05 MG/DL (ref 0.6–1.3)
EOSINOPHIL # BLD AUTO: 0.17 THOUSAND/ÂΜL (ref 0–0.61)
EOSINOPHIL NFR BLD AUTO: 3 % (ref 0–6)
ERYTHROCYTE [DISTWIDTH] IN BLOOD BY AUTOMATED COUNT: 13.5 % (ref 11.6–15.1)
GFR SERPL CREATININE-BSD FRML MDRD: 45 ML/MIN/1.73SQ M
GLUCOSE SERPL-MCNC: 107 MG/DL (ref 65–140)
GLUCOSE SERPL-MCNC: 109 MG/DL (ref 65–140)
GLUCOSE SERPL-MCNC: 123 MG/DL (ref 65–140)
GLUCOSE SERPL-MCNC: 132 MG/DL (ref 65–140)
GLUCOSE SERPL-MCNC: 148 MG/DL (ref 65–140)
HCT VFR BLD AUTO: 34.3 % (ref 34.8–46.1)
HGB BLD-MCNC: 10.8 G/DL (ref 11.5–15.4)
IMM GRANULOCYTES # BLD AUTO: 0.02 THOUSAND/UL (ref 0–0.2)
IMM GRANULOCYTES NFR BLD AUTO: 0 % (ref 0–2)
LYMPHOCYTES # BLD AUTO: 1.2 THOUSANDS/ÂΜL (ref 0.6–4.47)
LYMPHOCYTES NFR BLD AUTO: 19 % (ref 14–44)
MAGNESIUM SERPL-MCNC: 2.2 MG/DL (ref 1.9–2.7)
MCH RBC QN AUTO: 29.8 PG (ref 26.8–34.3)
MCHC RBC AUTO-ENTMCNC: 31.5 G/DL (ref 31.4–37.4)
MCV RBC AUTO: 95 FL (ref 82–98)
MONOCYTES # BLD AUTO: 0.55 THOUSAND/ÂΜL (ref 0.17–1.22)
MONOCYTES NFR BLD AUTO: 9 % (ref 4–12)
MRSA NOSE QL CULT: NORMAL
NEUTROPHILS # BLD AUTO: 4.44 THOUSANDS/ÂΜL (ref 1.85–7.62)
NEUTS SEG NFR BLD AUTO: 68 % (ref 43–75)
NRBC BLD AUTO-RTO: 0 /100 WBCS
PLATELET # BLD AUTO: 230 THOUSANDS/UL (ref 149–390)
PMV BLD AUTO: 10.3 FL (ref 8.9–12.7)
POTASSIUM SERPL-SCNC: 3.8 MMOL/L (ref 3.5–5.3)
PROCALCITONIN SERPL-MCNC: 1.64 NG/ML
RBC # BLD AUTO: 3.63 MILLION/UL (ref 3.81–5.12)
SODIUM SERPL-SCNC: 137 MMOL/L (ref 135–147)
WBC # BLD AUTO: 6.41 THOUSAND/UL (ref 4.31–10.16)

## 2024-06-11 PROCEDURE — 83735 ASSAY OF MAGNESIUM: CPT | Performed by: FAMILY MEDICINE

## 2024-06-11 PROCEDURE — 92526 ORAL FUNCTION THERAPY: CPT

## 2024-06-11 PROCEDURE — 99223 1ST HOSP IP/OBS HIGH 75: CPT | Performed by: PHYSICIAN ASSISTANT

## 2024-06-11 PROCEDURE — 99223 1ST HOSP IP/OBS HIGH 75: CPT | Performed by: STUDENT IN AN ORGANIZED HEALTH CARE EDUCATION/TRAINING PROGRAM

## 2024-06-11 PROCEDURE — 99232 SBSQ HOSP IP/OBS MODERATE 35: CPT | Performed by: STUDENT IN AN ORGANIZED HEALTH CARE EDUCATION/TRAINING PROGRAM

## 2024-06-11 PROCEDURE — 85025 COMPLETE CBC W/AUTO DIFF WBC: CPT | Performed by: FAMILY MEDICINE

## 2024-06-11 PROCEDURE — 99222 1ST HOSP IP/OBS MODERATE 55: CPT | Performed by: PHYSICIAN ASSISTANT

## 2024-06-11 PROCEDURE — 82948 REAGENT STRIP/BLOOD GLUCOSE: CPT

## 2024-06-11 PROCEDURE — 84145 PROCALCITONIN (PCT): CPT | Performed by: FAMILY MEDICINE

## 2024-06-11 PROCEDURE — 80048 BASIC METABOLIC PNL TOTAL CA: CPT | Performed by: FAMILY MEDICINE

## 2024-06-11 RX ORDER — LABETALOL HYDROCHLORIDE 5 MG/ML
10 INJECTION, SOLUTION INTRAVENOUS EVERY 4 HOURS PRN
Status: DISCONTINUED | OUTPATIENT
Start: 2024-06-11 | End: 2024-06-14 | Stop reason: HOSPADM

## 2024-06-11 RX ORDER — CEFTRIAXONE 1 G/50ML
1000 INJECTION, SOLUTION INTRAVENOUS EVERY 24 HOURS
Status: DISCONTINUED | OUTPATIENT
Start: 2024-06-11 | End: 2024-06-13

## 2024-06-11 RX ADMIN — Medication 1000 UNITS: at 09:27

## 2024-06-11 RX ADMIN — DONEPEZIL HYDROCHLORIDE 5 MG: 5 TABLET ORAL at 21:13

## 2024-06-11 RX ADMIN — GLYCERIN 1 DROP: .002; .002; .01 SOLUTION/ DROPS OPHTHALMIC at 17:57

## 2024-06-11 RX ADMIN — DOCUSATE SODIUM 100 MG: 100 CAPSULE, LIQUID FILLED ORAL at 17:57

## 2024-06-11 RX ADMIN — ACETAMINOPHEN 650 MG: 325 TABLET ORAL at 21:13

## 2024-06-11 RX ADMIN — MIRTAZAPINE 15 MG: 15 TABLET, FILM COATED ORAL at 21:13

## 2024-06-11 RX ADMIN — OXYBUTYNIN CHLORIDE 10 MG: 5 TABLET, EXTENDED RELEASE ORAL at 09:26

## 2024-06-11 RX ADMIN — ACETAMINOPHEN 650 MG: 325 TABLET ORAL at 09:26

## 2024-06-11 RX ADMIN — DOCUSATE SODIUM 100 MG: 100 CAPSULE, LIQUID FILLED ORAL at 09:26

## 2024-06-11 RX ADMIN — MEMANTINE 5 MG: 5 TABLET ORAL at 09:26

## 2024-06-11 RX ADMIN — LOSARTAN POTASSIUM 50 MG: 50 TABLET, FILM COATED ORAL at 09:26

## 2024-06-11 RX ADMIN — Medication 250 MG: at 17:57

## 2024-06-11 RX ADMIN — LOPERAMIDE HYDROCHLORIDE 2 MG: 2 CAPSULE ORAL at 09:26

## 2024-06-11 RX ADMIN — CEFTRIAXONE 1000 MG: 1 INJECTION, SOLUTION INTRAVENOUS at 16:01

## 2024-06-11 RX ADMIN — Medication 4.5 MG: at 21:13

## 2024-06-11 RX ADMIN — MEMANTINE 5 MG: 5 TABLET ORAL at 17:57

## 2024-06-11 RX ADMIN — PANTOPRAZOLE SODIUM 40 MG: 40 TABLET, DELAYED RELEASE ORAL at 05:01

## 2024-06-11 RX ADMIN — ENOXAPARIN SODIUM 30 MG: 30 INJECTION SUBCUTANEOUS at 09:31

## 2024-06-11 RX ADMIN — Medication 250 MG: at 09:26

## 2024-06-11 RX ADMIN — PIPERACILLIN SODIUM,TAZOBACTAM SODIUM 4.5 G: 4; .5 INJECTION, POWDER, FOR SOLUTION INTRAVENOUS at 09:29

## 2024-06-11 RX ADMIN — PAROXETINE HYDROCHLORIDE 40 MG: 20 TABLET, FILM COATED ORAL at 09:26

## 2024-06-11 RX ADMIN — INSULIN GLARGINE 15 UNITS: 100 INJECTION, SOLUTION SUBCUTANEOUS at 09:26

## 2024-06-11 RX ADMIN — Medication 400 MG: at 09:26

## 2024-06-11 RX ADMIN — GLYCERIN 1 DROP: .002; .002; .01 SOLUTION/ DROPS OPHTHALMIC at 09:30

## 2024-06-11 NOTE — CONSULTS
Consultation - Medical Oncology   Sabrina Barclay 89 y.o. female MRN: [id number]  Unit/Bed#: [address] [place] [address] Encounter: [id number]      Assessment & Plan   Patient is a 89-year-old female. She has underlying dementia and presents from a nursing facility.  She is currently being treated for sepsis of  source.    CT scan showed new 6.1 x 4.1 cm right breast mass inflammatory changes right axillary and right chest wall/subpectoral adenopathy attributed to malignancy and coby metastasis until proven otherwise.  No evidence of intrathoracic metastatic disease.  2.5 x 2.2 cm left breast hypodense nodule enlarged since April 2019.  CT scan of the abdomen and pelvis shows no evidence of metastatic disease.    CT scan is concerning for bilateral breast cancer.    The question is obviously how aggressive family would like to be with regard to work- up. Patient is 89 with underlying dementia and other medical issues. If family would like to pursue a biopsy (and pathology is ER+) then we could consider placing patient on an AI. It would also be reasonable to defer work-up given age, underlying issues as stated above.    I have tried to reach patient's son by telephone to clarify his wishes but have been unsuccessful. We will continue to follow with you.    History of Present Illness   Physician Requesting Consult: Felicitas Murphy DO  Reason for Consult / Principal Problem: Breast mass.  HPI: Sabrina Barclay is a 89 y.o. year old female.  Her past medical history is significant for dementia, depression, CKD 3, hypertension, type 2 diabetes, reflux.     She presented for evaluation of chills, low-grade temp, and elevated heart rate from her nursing home.    She was admitted for management of sepsis of  source.    She had CT scan of the chest, abdomen and pelvis.  This showed new 6.1 x 4.1 cm right breast mass, inflammatory changes right axillary and right chest wall/subpectoral adenopathy attributed to malignancy and coby metastasis  until proven otherwise.  No evidence of intrathoracic metastatic disease.  2.5 x 2.2 cm left breast hypodense nodule enlarged since April 2019.  CT scan of the abdomen and pelvis shows no evidence of metastatic disease.    Patient is a poor historian. She says that the right breast mass has been present for 2-3 days. It brings her no discomfort or pain.     Per review of charting she had left breast soft tissue nodular density present on CT chest without contrast back in April 2019. Unclear as to whether she has had any work-up completed.    Patient herself is in no distress. She denies nausea, vomiting. Admits to constipation. She denies chest pain or SOB,    Wound care is managing the chronic right breast wound.    Inpatient consult to Oncology  Consult performed by: Sara Castellon PA-C  Consult ordered by: Gildardo Gunderson MD        ROS:   12 point review of systems negative unless stated in the HPI.    Historical Information   Past Medical History:   Diagnosis Date    Anxiety     CKD (chronic kidney disease) stage 3, GFR 30-59 ml/min (HCC)     Dementia (HCC)     Depression     Diabetes mellitus (HCC)     Gait difficulty     GERD (gastroesophageal reflux disease)     Hyperlipidemia     Hypertension     Incontinence of urine     Macula lutea degeneration     Neurocognitive disorder     Osteoarthritis      Past Surgical History:   Procedure Laterality Date    APPENDECTOMY      HYSTERECTOMY      ORIF SHOULDER DISLOCATION W/ HUMERAL FRACTURE       Social History   Social History     Substance and Sexual Activity   Alcohol Use Not Currently     Social History     Substance and Sexual Activity   Drug Use Not Currently     E-Cigarette/Vaping    E-Cigarette Use Never User      E-Cigarette/Vaping Substances    Nicotine No     THC No     CBD No     Flavoring No     Other No     Unknown No      Social History     Tobacco Use   Smoking Status Never   Smokeless Tobacco Never     Family History:   Family History  "  Problem Relation Age of Onset    Heart disease Mother     No Known Problems Father     No Known Problems Sister     No Known Problems Brother     No Known Problems Maternal Aunt     No Known Problems Maternal Uncle     No Known Problems Paternal Aunt     No Known Problems Paternal Uncle     No Known Problems Maternal Grandmother     No Known Problems Maternal Grandfather     No Known Problems Paternal Grandmother     No Known Problems Paternal Grandfather     ADD / ADHD Neg Hx     Anesthesia problems Neg Hx     Cancer Neg Hx     Clotting disorder Neg Hx     Collagen disease Neg Hx     Diabetes Neg Hx     Dislocations Neg Hx     Learning disabilities Neg Hx     Neurological problems Neg Hx     Osteoporosis Neg Hx     Rheumatologic disease Neg Hx     Scoliosis Neg Hx     Vascular Disease Neg Hx        Meds/Allergies   all current active meds have been reviewed  No Known Allergies    Objective   Vitals: Blood pressure (!) 188/89, pulse 76, temperature 98.1 °F (36.7 °C), temperature source Oral, resp. rate 20, height 5' 4\" (1.626 m), weight 64.6 kg (142 lb 6.7 oz), SpO2 97%, not currently breastfeeding.    Intake/Output Summary (Last 24 hours) at 6/11/2024 0823  Last data filed at 6/10/2024 1200  Gross per 24 hour   Intake 360 ml   Output --   Net 360 ml     Invasive Devices       Peripheral Intravenous Line  Duration             Peripheral IV 06/09/24 Right;Ventral (anterior) Forearm 1 day                    PHYSICAL EXAM:  - GENERAL: Alert. Oriented to time only. No acute distress. Very pleasant.  - EYES: EOMI. Anicteric.  - HENT: Moist mucous membranes.   - LUNGS: Clear to auscultation bilaterally.   - BREASTS: Right breast: Wound not examined. Underlying breast mass is in upper-outer quadrant. Firm, around 6cm. Axillary adenopathy and right chest wall/subpectoral adenopathy difficult to palpate. Additional breast mass is possibly adjacent but more lateral. Left breast: Mass is medial. On examination there appear " to be 2 separate but adjacent nodules around 3-4cm in size in total. No overlying erythema or skin changes.   - CARDIOVASCULAR: Regular rate and rhythm. No murmur.   - ABDOMEN: Soft, non-tender and non-distended.   - EXTREMITIES: No edema. Non-tender.?SKIN: No rashes or lesions. Warm.  - NEUROLOGIC: No focal neurological deficits. CN II-XII grossly intact, but not individually tested.  - PSYCHIATRIC: Cooperative. Appropriate mood and affect.     Lab Results: CBC:   Lab Results   Component Value Date    WBC 6.41 06/11/2024    HGB 10.8 (L) 06/11/2024    HCT 34.3 (L) 06/11/2024    MCV 95 06/11/2024     06/11/2024    RBC 3.63 (L) 06/11/2024    MCH 29.8 06/11/2024    MCHC 31.5 06/11/2024    RDW 13.5 06/11/2024    MPV 10.3 06/11/2024    NRBC 0 06/11/2024     CMP:   Lab Results   Component Value Date    SODIUM 137 06/11/2024     06/11/2024    CO2 23 06/11/2024    BUN 11 06/11/2024    CREATININE 1.05 06/11/2024    CALCIUM 8.6 06/11/2024    EGFR 45 06/11/2024     Imaging Studies: I have personally reviewed pertinent reports.    Pathology, and Other Studies: I have personally reviewed pertinent reports.      Counseling / Coordination of Care  Total floor / unit time spent today 75 minutes. Greater than 50% of total time was spent with the patient and / or family counseling and / or coordination of care.

## 2024-06-11 NOTE — WOUND OSTOMY CARE
Patient was seen for wound care by Nikolai Sullivan PA-C for wound care and placed orders for management . Velia Mendiola RN BSN CWOCN

## 2024-06-11 NOTE — CONSULTS
Consultation - Infectious Disease   Sabrina Barclay 89 y.o. female MRN: [id number]  Unit/Bed#: [address] [address] Encounter: [id number]      IMPRESSION & RECOMMENDATIONS:     1.  Sepsis.  Tachycardia, tachypnea, leukocytosis, lactic acidosis.  Due to #2 below.  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  Blood cultures show no growth.  Urine culture growing over 100 K E. coli and Proteus mirabilis.  The patient is clinically improving, afebrile with downtrending white blood cell count.   -Stop IV Zosyn and start IV ceftriaxone 1 g every 24 hours   -Follow-up pending blood and urine cultures   -No need to treat superficial wound culture results as there are no clinical signs of infection of the wound   -Repeat CBC tomorrow to monitor treat response   -Monitor fever curve    2.  Right cystitis with ascending pyeloureteritis and pyelonephritis.  UA with pyuria.  Patient presented with nausea, vomiting, fever, chills.  CT A/P shows cystitis with right ascending pyeloureteritis and pyelonephritis.  Urine culture growing over 100 K E. coli and Proteus mirabilis.   -Antibiotics as above   -Follow-up urine and blood cultures    3.  Right breast wound.  Per report this has been chronic.  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  The wound does not appear infected.  A superficial culture was collected but this will represent skin aquilino/wound colonization.   -No antibiotics indicated even if wound culture is positive   -Oncology consulted   -Recommend local wound care, goals of care    4.  Type 2 diabetes mellitus.  Hemoglobin A1c 6.6%.  Risk factor for infection.    5.  Dementia.  Patient awake and alert but confused.  Continue home medications.    I have discussed the above management plan to transition antibiotics to IV ceftriaxone, follow up urine culture with Dr. Murphy who  is in agreement. ID will follow.    I have performed an extensive review of the medical records in Epic including review of the notes, radiographs, and laboratory results     HISTORY OF PRESENT ILLNESS:  Reason for Consult: Pyelonephritis, breast wound  HPI: Sabrina Barclay is a 89 y.o. woman with a history of dementia, depression, CKD 3, type 2 diabetes mellitus who was brought to [facility] from her nursing home on 6/9 with chills, fever, tachycardia, nausea and vomiting.  On evaluation today, the patient does not know why she was brought to the hospital or that she was in the hospital but does remember vomiting.  She denies any current fever, chills, abdominal pain.  She is still having some nausea.  The patient reports burning from her right breast wound.    On presentation the patient had a temperature of 99.9 with leukocytosis, lactic acidosis.  UA showed innumerable white blood cells and bacteria. CT C/A/P with contrast shows new right breast mass with inflammatory changes of the right axilla and chest wall concerning for malignancy with coby metastasis, cystitis with right ascending pyeloureteritis and pyelonephritis.  She was started on IV Zosyn.  Urine culture is growing over 100 K E. coli and Proteus mirabilis, wound culture from the right breast is pending but shows 2+ gram-positive rods.  ID is consulted for further evaluation.    REVIEW OF SYSTEMS:  A complete review of systems is negative other than that noted in the HPI.    PAST MEDICAL HISTORY:  Past Medical History:   Diagnosis Date    Anxiety     CKD (chronic kidney disease) stage 3, GFR 30-59 ml/min (HCC)     Dementia (HCC)     Depression     Diabetes mellitus (HCC)     Gait difficulty     GERD (gastroesophageal reflux disease)     Hyperlipidemia     Hypertension     Incontinence of urine     Macula lutea degeneration     Neurocognitive disorder     Osteoarthritis      Past Surgical History:   Procedure Laterality Date    APPENDECTOMY       HYSTERECTOMY      ORIF SHOULDER DISLOCATION W/ HUMERAL FRACTURE         FAMILY HISTORY:  Non-contributory    SOCIAL HISTORY:  Social History   Social History     Substance and Sexual Activity   Alcohol Use Not Currently     Social History     Substance and Sexual Activity   Drug Use Not Currently     Social History     Tobacco Use   Smoking Status Never   Smokeless Tobacco Never       ALLERGIES:  No Known Allergies    MEDICATIONS:  All current active medications have been reviewed.    PHYSICAL EXAM:  Temp:  [97.7 °F (36.5 °C)-98.2 °F (36.8 °C)] 98.1 °F (36.7 °C)  HR:  [67-76] 76  Resp:  [17-20] 20  BP: (125-188)/(58-89) 188/89  SpO2:  [94 %-98 %] 97 %  Temp (24hrs), Av °F (36.7 °C), Min:97.7 °F (36.5 °C), Max:98.2 °F (36.8 °C)  Current: Temperature: 98.1 °F (36.7 °C)  No intake or output data in the 24 hours ending 24 1200    General Appearance:  Appearing well, nontoxic, pleasant and interactive but confused   Head:  Normocephalic, without obvious abnormality, atraumatic   Eyes:  Conjunctiva pink and sclera anicteric, both eyes   Nose: Nares normal, mucosa normal, no drainage   Throat: Oropharynx moist without lesions   Neck: Supple, symmetrical, no adenopathy, no tenderness/mass/nodules   Back:   Symmetric, no curvature, ROM normal, no CVA tenderness   Lungs:   Clear to auscultation bilaterally, respirations unlabored   Chest Wall:  Lateral right breast/axillary wound.  No cellulitis or purulence   Heart:  RRR; no murmur, rub or gallop   Abdomen:   Soft, non-tender, non-distended, positive bowel sounds    Extremities: No cyanosis, clubbing or edema   Skin: No rashes or lesions.  Right breast wound as noted above   Lymph nodes: Cervical, supraclavicular nodes normal   Neurologic: Awake and alert, interactive       LABS, IMAGING, & OTHER STUDIES:  Lab Results:  I have personally reviewed pertinent labs.  Results from last 7 days   Lab Units 24  0434 06/10/24  0745 24  1952   WBC  Thousand/uL 6.41 11.00* 13.04*   HEMOGLOBIN g/dL 10.8* 10.0* 11.7   PLATELETS Thousands/uL 230 230 257     Results from last 7 days   Lab Units 06/11/24  0434 06/10/24  0156 06/09/24 1952   SODIUM mmol/L 137 135 137   POTASSIUM mmol/L 3.8 3.8 3.8   CHLORIDE mmol/L 106 104 103   CO2 mmol/L 23 21 20*   BUN mg/dL 11 13 18   CREATININE mg/dL 1.05 0.93 1.02   EGFR ml/min/1.73sq m 45 52 47   CALCIUM mg/dL 8.6 7.8* 9.2   AST U/L  --   --  14   ALT U/L  --   --  9   ALK PHOS U/L  --   --  75     Results from last 7 days   Lab Units 06/09/24 2108 06/09/24 2004 06/09/24 1952   BLOOD CULTURE   --   --  No Growth at 24 hrs.  No Growth at 24 hrs.   GRAM STAIN RESULT  No polys seen*  2+ Gram positive rods*  --   --    URINE CULTURE   --  >100,000 cfu/ml Escherichia coli*  >100,000 cfu/ml Proteus mirabilis*  --    WOUND CULTURE  Culture too young- will reincubate  --   --      Results from last 7 days   Lab Units 06/11/24  0434 06/10/24  0147 06/09/24 1952   PROCALCITONIN ng/ml 1.64* 2.01* 0.51*                   Imaging Studies:   I have personally reviewed pertinent imaging study reports and images in PACS.    CT C/A/P with contrast shows new right breast mass with inflammatory changes of the right axilla and chest wall, cystitis with right ascending pyeloureteritis and pyelonephritis

## 2024-06-11 NOTE — ASSESSMENT & PLAN NOTE
Placed on IV zosyn on admission  Urine culture in March 2024 grew E. coli and Aerococcus urinae, pan susceptible.  Follow-up urine culture  ID switched abx to ceftriaxone

## 2024-06-11 NOTE — SPEECH THERAPY NOTE
"Speech/Language Pathology Progress Note    Patient Name: Sabrina Barclay  Today's Date: 6/11/2024     Problem List  Principal Problem:    Severe sepsis (HCC)  Active Problems:    Benign essential hypertension    Type 2 diabetes mellitus, with long-term current use of insulin (HCC)    Depression    UTI (urinary tract infection)    Wound of right breast    Dementia (HCC)    CKD (chronic kidney disease) stage 3, GFR 30-59 ml/min (HCC)    GERD (gastroesophageal reflux disease)    Constipation    Subjective:  \"I can eat anything\" yet spitting out some foods today.    Objective:  Pt was seen for diagnostic dysphagia therapy to ensure tolerance of current diet (NDD2/mechanical soft and thin liquid) and to assess potential for safe diet upgrade.  Pt was alert and positioned upright.    Pt was assessed with mashed potatoes, minced chicken w/gravy, [place] cookie, some pieces \"dunked\" and thin coffee. No dentures were in place.  Pt able to bite off small pieces of \"dunked\" and dry cookie.  Anterior \"munching\" and lingual mashing used for breakdown with cookie.  The same was attempted with minced chicken.  Pt c/o dislike of chicken and spat out same (repeatedly).  When chicken mixed with mashed potatoes and gravy, incomplete bolus formation and transfer noted and pt again spat out pieces of chicken.  Bolus formation and transfer with cookie was WFL.  Throat clearing noted with dry cookie only.. No coughing, throat clearing, c/o stasis, multiple swallows, change in vocal quality noted c intake of thin liquid, mashed potatoes or min of minced chicken in mashed potatoes.    Plan/Recommendations:  Continue current diet while hospitalized.  Will place recommendations on AVS re: recommended diet, precautions including:   Soft cooked and moistened foods. Cut food well  Thin liquid.   OK for small pills whole with thin liquid.   Consider cutting/crushing large pills.  Oral care after all meals (ensure that oral cavity is clear of " food after all meals and medications).     Melanie Garcia MS CCC-SLP  [place] License [id number]

## 2024-06-11 NOTE — PROGRESS NOTES
[facility]  Progress Note  Name: Sabrina Barclay I  MRN: [id number]  Unit/Bed#: [address] I Date of Admission: 6/9/2024   Date of Service: 6/11/2024 I Hospital Day: 2    Assessment & Plan   * Wound of right breast  Assessment & Plan  Right breast wound.  ID recommends stopping zosyn as wound does not appear infection. 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 No evidence of intrathoracic metastatic disease.   Oncology consulted      UTI (urinary tract infection)  Assessment & Plan  Placed on IV zosyn on admission  Urine culture in March 2024 grew E. coli and Aerococcus urinae, pan susceptible.  Follow-up urine culture  ID switched abx to ceftriaxone      Constipation  Assessment & Plan  Bowel regimen ordered.    CKD (chronic kidney disease) stage 3, GFR 30-59 ml/min ([facility])  Assessment & Plan  Baseline creatinine appears to be around 1.1  Creatinine stable  Monitor    Dementia ([facility])  Assessment & Plan  Continue Aricept Namenda     Depression  Assessment & Plan  Continue Paxil and Remeron    Type 2 diabetes mellitus, with long-term current use of insulin ([facility])  Assessment & Plan  Lab Results   Component Value Date    HGBA1C 6.2 (H) 06/09/2024       Recent Labs     06/10/24  1620 06/10/24  2049 06/11/24  0743 06/11/24  1143   POCGLU 125 137 109 132         Blood Sugar Average: Last 72 hrs:  (P) 133.25    On Lantus 15 units subcu daily, metformin in nursing home.  Continue Lantus 15 units subcu daily in the morning  Hold metformin while inpatient  SSI  Diabetic diet      Benign essential hypertension  Assessment & Plan  Continue losartan with holding parameter  BP stable               VTE Pharmacologic Prophylaxis:   Moderate Risk (Score 3-4) - Pharmacological DVT  Prophylaxis Ordered: enoxaparin (Lovenox).    Mobility:   Basic Mobility Inpatient Raw Score: 16  JH-HLM Goal: 5: Stand one or more mins  JH-HLM Achieved: 6: Walk 10 steps or more  JH-HLM Goal NOT achieved. Continue with multidisciplinary rounding and encourage appropriate mobility to improve upon JH-HLM goals.    Patient Centered Rounds: I performed bedside rounds with nursing staff today.   Discussions with Specialists or Other Care Team Provider: case management, oncology, id    Education and Discussions with Family / Patient: Attempted to update  (son) via phone. Unable to contact.    Total Time Spent on Date of Encounter in care of patient: 25 mins. This time was spent on one or more of the following: performing physical exam; counseling and coordination of care; obtaining or reviewing history; documenting in the medical record; reviewing/ordering tests, medications or procedures; communicating with other healthcare professionals and discussing with patient's family/caregivers.    Current Length of Stay: 2 day(s)  Current Patient Status: Inpatient   Certification Statement: The patient will continue to require additional inpatient hospital stay due to pending urine cultures, Iv abx  Discharge Plan: Anticipate discharge in 24-48 hrs to discharge location to be determined pending rehab evaluations.    Code Status: Level 3 - DNAR and DNI    Subjective:   Pt states she feels well, denies any acute complaints    Objective:     Vitals:   Temp (24hrs), Av °F (36.7 °C), Min:97.7 °F (36.5 °C), Max:98.2 °F (36.8 °C)    Temp:  [97.7 °F (36.5 °C)-98.2 °F (36.8 °C)] 98.1 °F (36.7 °C)  HR:  [67-76] 76  Resp:  [17-20] 20  BP: (125-188)/(58-89) 188/89  SpO2:  [94 %-98 %] 97 %  Body mass index is 24.45 kg/m².     Input and Output Summary (last 24 hours):     Intake/Output Summary (Last 24 hours) at 2024 1401  Last data filed at 2024 0830  Gross per 24 hour   Intake 730 ml   Output --   Net 730 ml        Physical Exam:   Physical Exam  Vitals and nursing note reviewed.   Constitutional:       General: She is not in acute distress.     Appearance: She is well-developed.   HENT:      Head: Normocephalic and atraumatic.   Eyes:      Conjunctiva/sclera: Conjunctivae normal.   Cardiovascular:      Rate and Rhythm: Normal rate and regular rhythm.      Heart sounds: No murmur heard.  Pulmonary:      Effort: Pulmonary effort is normal. No respiratory distress.      Breath sounds: Normal breath sounds.   Abdominal:      Palpations: Abdomen is soft.      Tenderness: There is no abdominal tenderness.   Musculoskeletal:         General: No swelling.      Cervical back: Neck supple.   Skin:     General: Skin is warm and dry.   Neurological:      Mental Status: She is alert. Mental status is at baseline.   Psychiatric:         Mood and Affect: Mood normal.          Additional Data:     Labs:  Results from last 7 days   Lab Units 06/11/24  0434 06/10/24  0745 06/09/24 1952   WBC Thousand/uL 6.41   < > 13.04*   HEMOGLOBIN g/dL 10.8*   < > 11.7   HEMATOCRIT % 34.3*   < > 36.4   PLATELETS Thousands/uL 230   < > 257   BANDS PCT %  --   --  6   SEGS PCT % 68  --   --    LYMPHO PCT % 19  --  4*   MONO PCT % 9  --  4   EOS PCT % 3  --  0    < > = values in this interval not displayed.     Results from last 7 days   Lab Units 06/11/24  0434 06/10/24  0156 06/09/24 1952   SODIUM mmol/L 137   < > 137   POTASSIUM mmol/L 3.8   < > 3.8   CHLORIDE mmol/L 106   < > 103   CO2 mmol/L 23   < > 20*   BUN mg/dL 11   < > 18   CREATININE mg/dL 1.05   < > 1.02   ANION GAP mmol/L 8   < > 14*   CALCIUM mg/dL 8.6   < > 9.2   ALBUMIN g/dL  --   --  3.8   TOTAL BILIRUBIN mg/dL  --   --  0.33   ALK PHOS U/L  --   --  75   ALT U/L  --   --  9   AST U/L  --   --  14   GLUCOSE RANDOM mg/dL 107   < > 208*    < > = values in this interval not displayed.     Results from last 7 days   Lab Units 06/09/24 1952   INR  1.03     Results from last 7 days   Lab  Units 06/11/24  1143 06/11/24  0743 06/10/24  2049 06/10/24  1620 06/10/24  1533 06/10/24  1108 06/10/24  0756 06/10/24  0022   POC GLUCOSE mg/dl 132 109 137 125 153* 119 115 176*     Results from last 7 days   Lab Units 06/09/24 1952   HEMOGLOBIN A1C % 6.2*     Results from last 7 days   Lab Units 06/11/24  0434 06/10/24  0505 06/10/24  0152 06/10/24  0147 06/09/24  2148 06/09/24 1952   LACTIC ACID mmol/L  --  1.6 2.1*  --  2.9* 3.9*   PROCALCITONIN ng/ml 1.64*  --   --  2.01*  --  0.51*       Lines/Drains:  Invasive Devices       Peripheral Intravenous Line  Duration             Peripheral IV 06/09/24 Right;Ventral (anterior) Forearm 1 day              Drain  Duration             External Urinary Catheter <1 day                          Imaging: Reviewed radiology reports from this admission including: chest CT scan and abdominal/pelvic CT    Recent Cultures (last 7 days):   Results from last 7 days   Lab Units 06/09/24  2108 06/09/24  2004 06/09/24 1952   BLOOD CULTURE   --   --  No Growth at 24 hrs.  No Growth at 24 hrs.   GRAM STAIN RESULT  No polys seen*  2+ Gram positive rods*  --   --    URINE CULTURE   --  >100,000 cfu/ml Escherichia coli*  >100,000 cfu/ml Proteus mirabilis*  --    WOUND CULTURE  Culture too young- will reincubate  --   --        Last 24 Hours Medication List:   Current Facility-Administered Medications   Medication Dose Route Frequency Provider Last Rate    acetaminophen  650 mg Oral BID MADDISON Dong      acetaminophen  650 mg Oral Q8H PRN MADDISON Dong      Artificial Tears  1 drop Both Eyes BID MADDISON Dong      cefTRIAXone  1,000 mg Intravenous Q24H Claribel Mackenzie MD      cholecalciferol  1,000 Units Oral Daily MADDISON Dong      Diclofenac Sodium  2 g Topical TID PRN MADDISON Dong      docusate sodium  100 mg Oral BID Gildardo Gunderson MD      donepezil  5 mg Oral HS Cuiyin Yurik, CRNP      enoxaparin  30 mg Subcutaneous Daily MADDISON Dong       insulin glargine  15 Units Subcutaneous QAM Cuiyin Yurik, CRNP      insulin lispro  1-5 Units Subcutaneous TID AC Cuiyin Yurik, CRNP      insulin lispro  1-5 Units Subcutaneous HS Cuiyin Yurik, CRNP      lactated ringers  50 mL/hr Intravenous Continuous Cuiyin Yurik, CRNP 50 mL/hr (06/10/24 0030)    loperamide  2 mg Oral Daily Cuiyin Yurik, CRNP      losartan  50 mg Oral Daily Cuiyin Yurik, CRNP      magnesium Oxide  400 mg Oral Daily Cuiyin Yurik, CRNP      melatonin  4.5 mg Oral HS Cuiyin Yurik, CRNP      memantine  5 mg Oral BID Cuiyin Yurik, CRNP      mirtazapine  15 mg Oral HS Cuiyin Yurik, CRNP      oxybutynin  10 mg Oral Daily Cuiyin Yurik, CRNP      pantoprazole  40 mg Oral Early Morning Cuiyin Yurik, CRNP      PARoxetine  40 mg Oral QAM Cuiyin Yurik, CRNP      polyethylene glycol  17 g Oral Daily PRN Gildardo Gunderson MD      saccharomyces boulardii  250 mg Oral BID Cuiyin Yurik, CRNP      traMADol  25 mg Oral Q8H PRN Donna Paniagua, MADDISON          Today, Patient Was Seen By: Felicitas Murphy DO    **Please Note: This note may have been constructed using a voice recognition system.**

## 2024-06-11 NOTE — DISCHARGE INSTR - DIET
"CCD, RAS diet  Re: texture: select \"soft\" foods. Cook vegetables well and should pt ever select meat, please cut or mince very well and offer with mashed potatoes/gravy.   Thin liquid.   OK for small pills whole with thin liquid.   Consider cutting/crushing large pills.  Oral care after all meals (ensure that oral cavity is clear of food after all meals and medications).     Please do not hesitate to contact me with any questions or concerns.    Melanie Garcia MS CCC-SLP  [place] License [id number]   [phone]  "

## 2024-06-11 NOTE — ASSESSMENT & PLAN NOTE
Right breast wound.  ID recommends stopping zosyn as wound does not appear infection. 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 No evidence of intrathoracic metastatic disease.   Oncology consulted

## 2024-06-11 NOTE — CONSULTS
"Consultation - wound care   Sabrina Barclay 89 y.o. female MRN: [id number]  Unit/Bed#: [address] [place] [address] Encounter: [id number]        89-year-old past medical history of CKD stage III, dementia, DM type II, GERD, hyperlipidemia, hypertension osteoarthritis presenting with nausea vomiting and fever chills.  Wound care consulted for chronic right breast wound.  Patient is poor historian     Right breast: 5.5 x 1.5 x 0.2 cm      Cleanse wound daily with soap and water.   Apply silver antimicrobial wound gel.   Cover with allevyn dressing.         Historical Information   Past Medical History:   Diagnosis Date    Anxiety     CKD (chronic kidney disease) stage 3, GFR 30-59 ml/min ([facility])     Dementia (HCC)     Depression     Diabetes mellitus (HCC)     Gait difficulty     GERD (gastroesophageal reflux disease)     Hyperlipidemia     Hypertension     Incontinence of urine     Macula lutea degeneration     Neurocognitive disorder     Osteoarthritis      Past Surgical History:   Procedure Laterality Date    APPENDECTOMY      HYSTERECTOMY      ORIF SHOULDER DISLOCATION W/ HUMERAL FRACTURE       Social History   Social History     Substance and Sexual Activity   Alcohol Use Not Currently     Social History     Substance and Sexual Activity   Drug Use Not Currently     E-Cigarette/Vaping    E-Cigarette Use Never User      E-Cigarette/Vaping Substances    Nicotine No     THC No     CBD No     Flavoring No     Other No     Unknown No      Social History     Tobacco Use   Smoking Status Never   Smokeless Tobacco Never     Family History: non-contributory    Meds/Allergies   all current active meds have been reviewed  No Known Allergies    Objective   First Vitals:   Blood Pressure: 140/69 (06/09/24 1942)  Pulse: (!) 116 (06/09/24 1942)  Temperature: 99.9 °F (37.7 °C) (06/09/24 1942)  Temp Source: Oral (06/09/24 1942)  Respirations: (!) 23 (06/09/24 1942)  Height: 5' 4\" (162.6 cm) (06/10/24 0008)  Weight - Scale: 64.6 kg (142 lb 6.7 oz) " "(06/09/24 1942)  SpO2: 92 % (06/09/24 1942)    Current Vitals:   Blood Pressure: (!) 188/89 (06/11/24 0735)  Pulse: 76 (06/11/24 0735)  Temperature: 98.1 °F (36.7 °C) (06/11/24 0735)  Temp Source: Oral (06/11/24 0735)  Respirations: 20 (06/11/24 0735)  Height: 5' 4\" (162.6 cm) (06/10/24 0008)  Weight - Scale: 64.6 kg (142 lb 6.7 oz) (06/10/24 0008)  SpO2: 97 % (06/11/24 0735)      Intake/Output Summary (Last 24 hours) at 6/11/2024 1249  Last data filed at 6/11/2024 0830  Gross per 24 hour   Intake 730 ml   Output --   Net 730 ml       Invasive Devices       Peripheral Intravenous Line  Duration             Peripheral IV 06/09/24 Right;Ventral (anterior) Forearm 1 day              Drain  Duration             External Urinary Catheter <1 day                        Lab Results: I have personally reviewed pertinent lab results.  , CBC:   Lab Results   Component Value Date    WBC 6.41 06/11/2024    HGB 10.8 (L) 06/11/2024    HCT 34.3 (L) 06/11/2024    MCV 95 06/11/2024     06/11/2024    RBC 3.63 (L) 06/11/2024    MCH 29.8 06/11/2024    MCHC 31.5 06/11/2024    RDW 13.5 06/11/2024    MPV 10.3 06/11/2024    NRBC 0 06/11/2024   , CMP:   Lab Results   Component Value Date    SODIUM 137 06/11/2024    K 3.8 06/11/2024     06/11/2024    CO2 23 06/11/2024    BUN 11 06/11/2024    CREATININE 1.05 06/11/2024    CALCIUM 8.6 06/11/2024    EGFR 45 06/11/2024     Imaging: I have personally reviewed pertinent reports.    CT chest abdomen pelvis w contrast   Final Result by Oswald Mcneal MD (06/10 0646)      CT chest:      No evidence of acute intrathoracic process.      New 6.1 x 4.1 cm right breast mass, inflammatory changes right axillary and right chest wall/subpectoral adenopathy attributed to malignancy and coby metastasis until proven otherwise. No evidence of intrathoracic metastatic disease.      2.5 x 2.2 cm left breast hypodense nodule enlarged since April 2019. Correlate with any previous work-up. "      Moderate chronic compression fracture of T9, stable since March 2024 and new since April 2019.      Additional chronic findings and negatives as above.      CT abdomen and pelvis:      Cystitis, right ascending pyeloureteritis and mild right pyelonephritis. No abscess or hydronephrosis.      No evidence of abdominopelvic metastatic disease.      Colonic diverticulosis.      Moderate chronic compression fracture of L2, stable since March 2024.      Additional chronic findings and negatives as above.      Findings are consistent with the preliminary report from [facility] which was provided shortly after completion of the exam.                  Workstation performed: [id number]             EKG, Pathology, and Other Studies: I have personally reviewed pertinent reports.      Counseling / Coordination of Care  Total floor / unit time spent today 30 minutes.  Greater than 50% of total time was spent with the patient and / or family counseling and / or coordination of care.  A description of the counseling / coordination of care: obtaining history, performing physical exam, reviewing pertinent labs imaging, discussing case with attending.    .

## 2024-06-11 NOTE — ASSESSMENT & PLAN NOTE
Lab Results   Component Value Date    HGBA1C 6.2 (H) 06/09/2024       Recent Labs     06/10/24  1620 06/10/24  2049 06/11/24  0743 06/11/24  1143   POCGLU 125 137 109 132         Blood Sugar Average: Last 72 hrs:  (P) 133.25    On Lantus 15 units subcu daily, metformin in nursing home.  Continue Lantus 15 units subcu daily in the morning  Hold metformin while inpatient  SSI  Diabetic diet

## 2024-06-11 NOTE — PLAN OF CARE
Problem: Knowledge Deficit  Goal: Patient/family/caregiver demonstrates understanding of disease process, treatment plan, medications, and discharge instructions  Description: Complete learning assessment and assess knowledge base.  Interventions:  - Provide teaching at level of understanding  - Provide teaching via preferred learning methods  Outcome: Progressing     Problem: INFECTION - ADULT  Goal: Absence or prevention of progression during hospitalization  Description: INTERVENTIONS:  - Assess and monitor for signs and symptoms of infection  - Monitor lab/diagnostic results  - Monitor all insertion sites, i.e. indwelling lines, tubes, and drains  - Monitor endotracheal if appropriate and nasal secretions for changes in amount and color  - [place] appropriate cooling/warming therapies per order  - Administer medications as ordered  - Instruct and encourage patient and family to use good hand hygiene technique  - Identify and instruct in appropriate isolation precautions for identified infection/condition  Outcome: Progressing     Problem: SAFETY ADULT  Goal: Patient will remain free of falls  Description: INTERVENTIONS:  - Educate patient/family on patient safety including physical limitations  - Instruct patient to call for assistance with activity   - Consult OT/PT to assist with strengthening/mobility   - Keep Call bell within reach  - Keep bed low and locked with side rails adjusted as appropriate  - Keep care items and personal belongings within reach  - Initiate and maintain comfort rounds  - Make Fall Risk Sign visible to staff  - Offer Toileting every 2 Hours, in advance of need  - Initiate/Maintain bed alarm  - Apply yellow socks and bracelet for high fall risk patients  - Consider moving patient to room near nurses station  Outcome: Progressing  Goal: Maintain or return to baseline ADL function  Description: INTERVENTIONS:  -  Assess patient's ability to carry out ADLs; assess patient's baseline  for ADL function and identify physical deficits which impact ability to perform ADLs (bathing, care of mouth/teeth, toileting, grooming, dressing, etc.)  - Assess/evaluate cause of self-care deficits   - Assess range of motion  - Assess patient's mobility; develop plan if impaired  - Assess patient's need for assistive devices and provide as appropriate  - Encourage maximum independence but intervene and supervise when necessary  - Involve family in performance of ADLs  - Assess for home care needs following discharge   - Consider OT consult to assist with ADL evaluation and planning for discharge  - Provide patient education as appropriate  Outcome: Progressing  Goal: Maintains/Returns to pre admission functional level  Description: INTERVENTIONS:  - Perform AM-PAC 6 Click Basic Mobility/ Daily Activity assessment daily.  - Set and communicate daily mobility goal to care team and patient/family/caregiver.   - Collaborate with rehabilitation services on mobility goals if consulted  - Perform Range of Motion 2 times a day.  - Reposition patient every 2 hours.  - Dangle patient 2 times a day  - Stand patient 2 times a day  - Ambulate patient 2 times a day  - Out of bed to chair 2 times a day   - Out of bed for meals 2 times a day  - Out of bed for toileting  - Record patient progress and toleration of activity level   Outcome: Progressing     Problem: Prexisting or High Potential for Compromised Skin Integrity  Goal: Skin integrity is maintained or improved  Description: INTERVENTIONS:  - Identify patients at risk for skin breakdown  - Assess and monitor skin integrity  - Assess and monitor nutrition and hydration status  - Monitor labs   - Assess for incontinence   - Turn and reposition patient  - Assist with mobility/ambulation  - Relieve pressure over bony prominences  - Avoid friction and shearing  - Provide appropriate hygiene as needed including keeping skin clean and dry  - Evaluate need for skin  moisturizer/barrier cream  - Collaborate with interdisciplinary team   - Patient/family teaching  - Consider wound care consult   Outcome: Progressing     Problem: GENITOURINARY - ADULT  Goal: Maintains or returns to baseline urinary function  Description: INTERVENTIONS:  - Assess urinary function  - Encourage oral fluids to ensure adequate hydration if ordered  - Administer IV fluids as ordered to ensure adequate hydration  - Administer ordered medications as needed  - Offer frequent toileting  - Follow urinary retention protocol if ordered  Outcome: Progressing  Goal: Absence of urinary retention  Description: INTERVENTIONS:  - Assess patient’s ability to void and empty bladder  - Monitor I/O  - Bladder scan as needed  - Discuss with physician/AP medications to alleviate retention as needed  - Discuss catheterization for long term situations as appropriate  Outcome: Progressing     Problem: METABOLIC, FLUID AND ELECTROLYTES - ADULT  Goal: Glucose maintained within target range  Description: INTERVENTIONS:  - Monitor Blood Glucose as ordered  - Assess for signs and symptoms of hyperglycemia and hypoglycemia  - Administer ordered medications to maintain glucose within target range  - Assess nutritional intake and initiate nutrition service referral as needed  Outcome: Progressing

## 2024-06-11 NOTE — PLAN OF CARE
Recommendations:   Soft cooked and moistened foods. Cut food well  Thin liquid.   OK for small pills whole with thin liquid.   Consider cutting/crushing large pills.  Oral care after all meals (ensure that oral cavity is clear of food after all meals and medications).

## 2024-06-12 LAB
BACTERIA UR CULT: ABNORMAL
BACTERIA UR CULT: ABNORMAL
GLUCOSE SERPL-MCNC: 124 MG/DL (ref 65–140)
GLUCOSE SERPL-MCNC: 135 MG/DL (ref 65–140)
GLUCOSE SERPL-MCNC: 146 MG/DL (ref 65–140)
GLUCOSE SERPL-MCNC: 96 MG/DL (ref 65–140)

## 2024-06-12 PROCEDURE — 82948 REAGENT STRIP/BLOOD GLUCOSE: CPT

## 2024-06-12 PROCEDURE — 99233 SBSQ HOSP IP/OBS HIGH 50: CPT | Performed by: STUDENT IN AN ORGANIZED HEALTH CARE EDUCATION/TRAINING PROGRAM

## 2024-06-12 PROCEDURE — 92526 ORAL FUNCTION THERAPY: CPT

## 2024-06-12 PROCEDURE — 99232 SBSQ HOSP IP/OBS MODERATE 35: CPT | Performed by: STUDENT IN AN ORGANIZED HEALTH CARE EDUCATION/TRAINING PROGRAM

## 2024-06-12 RX ORDER — AMOXICILLIN 250 MG
2 CAPSULE ORAL 2 TIMES DAILY
Status: DISCONTINUED | OUTPATIENT
Start: 2024-06-12 | End: 2024-06-14 | Stop reason: HOSPADM

## 2024-06-12 RX ORDER — POLYETHYLENE GLYCOL 3350 17 G/17G
17 POWDER, FOR SOLUTION ORAL ONCE
Status: COMPLETED | OUTPATIENT
Start: 2024-06-12 | End: 2024-06-12

## 2024-06-12 RX ADMIN — SENNOSIDES AND DOCUSATE SODIUM 2 TABLET: 50; 8.6 TABLET ORAL at 17:00

## 2024-06-12 RX ADMIN — DONEPEZIL HYDROCHLORIDE 5 MG: 5 TABLET ORAL at 22:25

## 2024-06-12 RX ADMIN — DOCUSATE SODIUM 100 MG: 100 CAPSULE, LIQUID FILLED ORAL at 09:39

## 2024-06-12 RX ADMIN — PAROXETINE HYDROCHLORIDE 40 MG: 20 TABLET, FILM COATED ORAL at 09:39

## 2024-06-12 RX ADMIN — ACETAMINOPHEN 650 MG: 325 TABLET ORAL at 22:25

## 2024-06-12 RX ADMIN — Medication 250 MG: at 17:00

## 2024-06-12 RX ADMIN — GLYCERIN 1 DROP: .002; .002; .01 SOLUTION/ DROPS OPHTHALMIC at 09:51

## 2024-06-12 RX ADMIN — Medication 1000 UNITS: at 09:39

## 2024-06-12 RX ADMIN — LOSARTAN POTASSIUM 50 MG: 50 TABLET, FILM COATED ORAL at 09:39

## 2024-06-12 RX ADMIN — POLYETHYLENE GLYCOL 3350 17 G: 17 POWDER, FOR SOLUTION ORAL at 13:18

## 2024-06-12 RX ADMIN — Medication 250 MG: at 09:39

## 2024-06-12 RX ADMIN — MIRTAZAPINE 15 MG: 15 TABLET, FILM COATED ORAL at 22:25

## 2024-06-12 RX ADMIN — OXYBUTYNIN CHLORIDE 10 MG: 5 TABLET, EXTENDED RELEASE ORAL at 09:39

## 2024-06-12 RX ADMIN — MEMANTINE 5 MG: 5 TABLET ORAL at 17:00

## 2024-06-12 RX ADMIN — LOPERAMIDE HYDROCHLORIDE 2 MG: 2 CAPSULE ORAL at 09:39

## 2024-06-12 RX ADMIN — Medication 4.5 MG: at 22:25

## 2024-06-12 RX ADMIN — PANTOPRAZOLE SODIUM 40 MG: 40 TABLET, DELAYED RELEASE ORAL at 05:06

## 2024-06-12 RX ADMIN — ENOXAPARIN SODIUM 30 MG: 30 INJECTION SUBCUTANEOUS at 09:39

## 2024-06-12 RX ADMIN — INSULIN GLARGINE 15 UNITS: 100 INJECTION, SOLUTION SUBCUTANEOUS at 09:42

## 2024-06-12 RX ADMIN — Medication 400 MG: at 09:39

## 2024-06-12 RX ADMIN — MEMANTINE 5 MG: 5 TABLET ORAL at 09:39

## 2024-06-12 RX ADMIN — GLYCERIN 1 DROP: .002; .002; .01 SOLUTION/ DROPS OPHTHALMIC at 17:01

## 2024-06-12 RX ADMIN — CEFTRIAXONE 1000 MG: 1 INJECTION, SOLUTION INTRAVENOUS at 16:57

## 2024-06-12 RX ADMIN — ACETAMINOPHEN 650 MG: 325 TABLET ORAL at 04:04

## 2024-06-12 NOTE — PLAN OF CARE
Problem: Knowledge Deficit  Goal: Patient/family/caregiver demonstrates understanding of disease process, treatment plan, medications, and discharge instructions  Description: Complete learning assessment and assess knowledge base.  Interventions:  - Provide teaching at level of understanding  - Provide teaching via preferred learning methods  Outcome: Progressing     Problem: INFECTION - ADULT  Goal: Absence or prevention of progression during hospitalization  Description: INTERVENTIONS:  - Assess and monitor for signs and symptoms of infection  - Monitor lab/diagnostic results  - Monitor all insertion sites, i.e. indwelling lines, tubes, and drains  - Monitor endotracheal if appropriate and nasal secretions for changes in amount and color  - Purvis appropriate cooling/warming therapies per order  - Administer medications as ordered  - Instruct and encourage patient and family to use good hand hygiene technique  - Identify and instruct in appropriate isolation precautions for identified infection/condition  Outcome: Progressing     Problem: SAFETY ADULT  Goal: Patient will remain free of falls  Description: INTERVENTIONS:  - Educate patient/family on patient safety including physical limitations  - Instruct patient to call for assistance with activity   - Consult OT/PT to assist with strengthening/mobility   - Keep Call bell within reach  - Keep bed low and locked with side rails adjusted as appropriate  - Keep care items and personal belongings within reach  - Initiate and maintain comfort rounds  - Make Fall Risk Sign visible to staff  - Offer Toileting every 2 Hours, in advance of need  - Initiate/Maintain bed alarm  - Obtain necessary fall risk management equipment: yellow socks  - Apply yellow socks and bracelet for high fall risk patients  - Consider moving patient to room near nurses station  Outcome: Progressing  Goal: Maintain or return to baseline ADL function  Description: INTERVENTIONS:  -  Assess  patient's ability to carry out ADLs; assess patient's baseline for ADL function and identify physical deficits which impact ability to perform ADLs (bathing, care of mouth/teeth, toileting, grooming, dressing, etc.)  - Assess/evaluate cause of self-care deficits   - Assess range of motion  - Assess patient's mobility; develop plan if impaired  - Assess patient's need for assistive devices and provide as appropriate  - Encourage maximum independence but intervene and supervise when necessary  - Involve family in performance of ADLs  - Assess for home care needs following discharge   - Consider OT consult to assist with ADL evaluation and planning for discharge  - Provide patient education as appropriate  Outcome: Progressing  Goal: Maintains/Returns to pre admission functional level  Description: INTERVENTIONS:  - Perform AM-PAC 6 Click Basic Mobility/ Daily Activity assessment daily.  - Set and communicate daily mobility goal to care team and patient/family/caregiver.   - Collaborate with rehabilitation services on mobility goals if consulted  - Perform Range of Motion 3 times a day.  - Reposition patient every 2 hours.  - Dangle patient 3 times a day  - Stand patient 3 times a day  - Ambulate patient 3 times a day  - Out of bed to chair 3 times a day   - Out of bed for meals 3 times a day  - Out of bed for toileting  - Record patient progress and toleration of activity level   Outcome: Progressing     Problem: Prexisting or High Potential for Compromised Skin Integrity  Goal: Skin integrity is maintained or improved  Description: INTERVENTIONS:  - Identify patients at risk for skin breakdown  - Assess and monitor skin integrity  - Assess and monitor nutrition and hydration status  - Monitor labs   - Assess for incontinence   - Turn and reposition patient  - Assist with mobility/ambulation  - Relieve pressure over bony prominences  - Avoid friction and shearing  - Provide appropriate hygiene as needed including  keeping skin clean and dry  - Evaluate need for skin moisturizer/barrier cream  - Collaborate with interdisciplinary team   - Patient/family teaching  - Consider wound care consult   Outcome: Progressing     Problem: GENITOURINARY - ADULT  Goal: Maintains or returns to baseline urinary function  Description: INTERVENTIONS:  - Assess urinary function  - Encourage oral fluids to ensure adequate hydration if ordered  - Administer IV fluids as ordered to ensure adequate hydration  - Administer ordered medications as needed  - Offer frequent toileting  - Follow urinary retention protocol if ordered  Outcome: Progressing  Goal: Absence of urinary retention  Description: INTERVENTIONS:  - Assess patient’s ability to void and empty bladder  - Monitor I/O  - Bladder scan as needed  - Discuss with physician/AP medications to alleviate retention as needed  - Discuss catheterization for long term situations as appropriate  Outcome: Progressing     Problem: METABOLIC, FLUID AND ELECTROLYTES - ADULT  Goal: Glucose maintained within target range  Description: INTERVENTIONS:  - Monitor Blood Glucose as ordered  - Assess for signs and symptoms of hyperglycemia and hypoglycemia  - Administer ordered medications to maintain glucose within target range  - Assess nutritional intake and initiate nutrition service referral as needed  Outcome: Progressing     Problem: SKIN/TISSUE INTEGRITY - ADULT  Goal: Skin Integrity remains intact(Skin Breakdown Prevention)  Description: Assess:  -Perform Asim assessment every shift  -Clean and moisturize skin every shift  -Inspect skin when repositioning, toileting, and assisting with ADLS  -Assess under medical devices such as IV sites every shift  -Assess extremities for adequate circulation and sensation     Bed Management:  -Have minimal linens on bed & keep smooth, unwrinkled  -Change linens as needed when moist or perspiring  -Avoid sitting or lying in one position for more than 2 hours while  in bed  -Keep HOB at 45degrees     Toileting:  -Offer bedside commode  -Assess for incontinence every shift  -Use incontinent care products after each incontinent episode such as barrier creams    Activity:  -Mobilize patient 3 times a day  -Encourage activity and walks on unit  -Encourage or provide ROM exercises   -Turn and reposition patient every 2 Hours  -Use appropriate equipment to lift or move patient in bed  -Instruct/ Assist with weight shifting every shift when out of bed in chair  -Consider limitation of chair time 2 hour intervals    Skin Care:  -Avoid use of baby powder, tape, friction and shearing, hot water or constrictive clothing  -Relieve pressure over bony prominences using waffle cushion  -Do not massage red bony areas    Next Steps:  -Teach patient strategies to minimize risks such as PT/OT   -Consider consults to  interdisciplinary teams such as PT/OT  Outcome: Progressing     Problem: Nutrition/Hydration-ADULT  Goal: Nutrient/Hydration intake appropriate for improving, restoring or maintaining nutritional needs  Description: Monitor and assess patient's nutrition/hydration status for malnutrition. Collaborate with interdisciplinary team and initiate plan and interventions as ordered.  Monitor patient's weight and dietary intake as ordered or per policy. Utilize nutrition screening tool and intervene as necessary. Determine patient's food preferences and provide high-protein, high-caloric foods as appropriate.     INTERVENTIONS:  - Monitor oral intake, urinary output, labs, and treatment plans  - Assess nutrition and hydration status and recommend course of action  - Evaluate amount of meals eaten  - Assist patient with eating if necessary   - Allow adequate time for meals  - Recommend/ encourage appropriate diets, oral nutritional supplements, and vitamin/mineral supplements  - Order, calculate, and assess calorie counts as needed  - Recommend, monitor, and adjust tube feedings and TPN/PPN  based on assessed needs  - Assess need for intravenous fluids  - Provide specific nutrition/hydration education as appropriate  - Include patient/family/caregiver in decisions related to nutrition  Outcome: Progressing

## 2024-06-12 NOTE — ASSESSMENT & PLAN NOTE
Right breast wound.  ID recommends stopping zosyn as wound does not appear infection. 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 No evidence of intrathoracic metastatic disease.   Oncology consulted  Per oncology if family would like to pursue a biopsy (and pathology is ER+) then we could consider placing patient on an AI.   Outpatient oncology follow up

## 2024-06-12 NOTE — DISCHARGE INSTR - AVS FIRST PAGE
Speech Recommendations:   Soft cooked and moistened foods. Cut food well  Thin liquid.   OK for small pills whole with thin liquid.   Consider cutting/crushing large pills.  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 No evidence of intrathoracic metastatic disease.     2.5 x 2.2 cm left breast hypodense nodule enlarged since April 2019. Correlate with any previous work-up.     CT abdomen and pelvis:  Cystitis, right ascending pyeloureteritis and mild right pyelonephritis. No abscess or hydronephrosis.    Per oncology if family would like to pursue a biopsy (and pathology is ER+) then we could consider placing patient on an aromatase inhibitor      Continue amoxicillin 500 mg every 8 hours to complete a 10-day total course for pyelonephritis, through 6/19/2024.

## 2024-06-12 NOTE — ASSESSMENT & PLAN NOTE
Lab Results   Component Value Date    HGBA1C 6.2 (H) 06/09/2024       Recent Labs     06/11/24  1143 06/11/24  1625 06/11/24  2112 06/12/24  0738   POCGLU 132 148* 123 96         Blood Sugar Average: Last 72 hrs:  (P) [id number]    Continue home regiment on discharge

## 2024-06-12 NOTE — SPEECH THERAPY NOTE
"SLP Progress Note    Patient Name: Sabrina Barclay  Today's Date: 6/12/2024     Problem List  Principal Problem:    Wound of right breast  Active Problems:    Benign essential hypertension    Type 2 diabetes mellitus, with long-term current use of insulin ([facility])    Depression    UTI (urinary tract infection)    Dementia ([facility])    CKD (chronic kidney disease) stage 3, GFR 30-59 ml/min ([facility])    Constipation    Subjective:  Pt expressed desire for \"regular\" waffle (not pureed) or blueberry muffin.     Objective:  Pt was seen for diagnostic dysphagia therapy to ensure tolerance of least restrictive diet.  Pt was alert and positioned upright.    Pt was assessed with small amount of puree (pureed waffle)  blueberry muffin and coffee. Pt edentulous.  Mastication was appropraitely prolonged and effective.  Bolus formation and transfer were min impaired today (min oral residue-->much less than was seen with scrambled eggs).  Swallow initiation appeared prompt.  No coughing, throat clearing, c/o stasis, multiple swallows, change in vocal quality noted c po intake today.     I spoke with staff at her [facility] (~10:55-11:05).  Baseline diet level confirmed (CCD, and a S, low lactose secondary to diarrhea, regular texture and thin liquid).  RN/staff (Emmy and 2nd caregiver in room/uncertain of name) reported that patient is a \"picky\" eater.  She very often chooses grilled cheese sandwich for her p.m. meals.  I relayed the finding of patient spitting out all meats even when minced, as well as significant oral residue with eggs.  We discussed diet for discharge with my recommendation for patient to select soft foods, kitchen to cook vegetables well and cut meat well should patient ever choose meat.  I also suggested that they complete oral care/ensure that oral cavity is clear of foods after all meals (all in agreement     Assessment:  Patient stated dislike of the appearance and taste of puréed waffles today.  She tolerated blueberry " muffin and thin liquid with no significant symptoms of oral dysphagia and no signs of pharyngeal dysphagia.  Caregivers at her  [facility] reported that she very often select very soft food, primarily ordering soft sandwich (mostly grilled cheese, occasionally tuna or egg salad)     Plan/Recommendations:  Consider upgrade to level 3 dysphagia/more liberalize diet so that patient can select and obtained desired soft foods.  Please continue oral care/Ensure clear oral cavity after all meals.    Melanie Garcia MS CCC-SLP  [place] License [id number]

## 2024-06-12 NOTE — PROGRESS NOTES
[facility]  Progress Note  Name: Sabrina Barclay I  MRN: [id number]  Unit/Bed#: [address] [place] [address] Date of Admission: 6/9/2024   Date of Service: 6/12/2024 I Hospital Day: 3    Assessment & Plan   * Wound of right breast  Assessment & Plan  Right breast wound.  ID recommends stopping zosyn as wound does not appear infection. 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 No evidence of intrathoracic metastatic disease.   Oncology consulted  Per oncology if family would like to pursue a biopsy (and pathology is ER+) then we could consider placing patient on an AI.     UTI (urinary tract infection)  Assessment & Plan  Placed on IV zosyn on admission  Urine culture in March 2024 grew E. coli and Aerococcus urinae, pan susceptible.  Follow-up urine culture  ID switched abx to ceftriaxone  Urine culture growing E. coli and Proteus follow-up sensitivities      Constipation  Assessment & Plan  Bowel regimen ordered.    CKD (chronic kidney disease) stage 3, GFR 30-59 ml/min ([facility])  Assessment & Plan  Baseline creatinine appears to be around 1.1  Creatinine stable  Monitor    Dementia ([facility])  Assessment & Plan  Continue Aricept Namenda     Depression  Assessment & Plan  Continue Paxil and Remeron    Type 2 diabetes mellitus, with long-term current use of insulin ([facility])  Assessment & Plan  Lab Results   Component Value Date    HGBA1C 6.2 (H) 06/09/2024       Recent Labs     06/11/24  1143 06/11/24  1625 06/11/24  2112 06/12/24  0738   POCGLU 132 148* 123 96         Blood Sugar Average: Last 72 hrs:  (P) 130.5618863794668204    On Lantus 15 units subcu daily, metformin in nursing home.  Continue Lantus 15 units subcu daily in the morning  Hold metformin while inpatient  SSI  Diabetic  diet      Benign essential hypertension  Assessment & Plan  Continue losartan with holding parameter  BP stable               VTE Pharmacologic Prophylaxis: VTE Score: 3 Moderate Risk (Score 3-4) - Pharmacological DVT Prophylaxis Ordered: enoxaparin (Lovenox).    Mobility:   Basic Mobility Inpatient Raw Score: 16  JH-HLM Goal: 5: Stand one or more mins  JH-HLM Achieved: 5: Stand (1 or more minutes)  JH-HLM Goal achieved. Continue to encourage appropriate mobility.    Patient Centered Rounds: I performed bedside rounds with nursing staff today.   Discussions with Specialists or Other Care Team Provider: case management, oncology, id    Education and Discussions with Family / Patient: Attempted to update  (son) via phone. Unable to contact.    Total Time Spent on Date of Encounter in care of patient: 25 mins. This time was spent on one or more of the following: performing physical exam; counseling and coordination of care; obtaining or reviewing history; documenting in the medical record; reviewing/ordering tests, medications or procedures; communicating with other healthcare professionals and discussing with patient's family/caregivers.    Current Length of Stay: 3 day(s)  Current Patient Status: Inpatient   Certification Statement: The patient will continue to require additional inpatient hospital stay due to pending urine cultures, Iv abx  Discharge Plan: Anticipate discharge in 24-48 hrs to discharge location to be determined pending rehab evaluations.    Code Status: Level 3 - DNAR and DNI    Subjective:   Pt states she feels well, denies any acute complaints    Objective:     Vitals:   Temp (24hrs), Av.4 °F (36.9 °C), Min:97.9 °F (36.6 °C), Max:98.9 °F (37.2 °C)    Temp:  [97.9 °F (36.6 °C)-98.9 °F (37.2 °C)] 98 °F (36.7 °C)  HR:  [70-75] 75  Resp:  [14-18] 18  BP: (129-199)/(65-86) 165/85  SpO2:  [97 %-99 %] 98 %  Body mass index is 24.45 kg/m².     Input and Output Summary (last 24 hours):      Intake/Output Summary (Last 24 hours) at 6/12/2024 0922  Last data filed at 6/11/2024 2119  Gross per 24 hour   Intake 236 ml   Output 1375 ml   Net -1139 ml       Physical Exam:   Physical Exam  Vitals and nursing note reviewed.   Constitutional:       General: She is not in acute distress.     Appearance: She is well-developed.   HENT:      Head: Normocephalic and atraumatic.   Eyes:      Conjunctiva/sclera: Conjunctivae normal.   Cardiovascular:      Rate and Rhythm: Normal rate and regular rhythm.      Heart sounds: No murmur heard.  Pulmonary:      Effort: Pulmonary effort is normal. No respiratory distress.      Breath sounds: Normal breath sounds.   Abdominal:      Palpations: Abdomen is soft.      Tenderness: There is no abdominal tenderness.   Musculoskeletal:         General: No swelling.      Cervical back: Neck supple.   Skin:     General: Skin is warm and dry.   Neurological:      Mental Status: She is alert. Mental status is at baseline.   Psychiatric:         Mood and Affect: Mood normal.          Additional Data:     Labs:  Results from last 7 days   Lab Units 06/11/24  0434 06/10/24  0745 06/09/24 1952   WBC Thousand/uL 6.41   < > 13.04*   HEMOGLOBIN g/dL 10.8*   < > 11.7   HEMATOCRIT % 34.3*   < > 36.4   PLATELETS Thousands/uL 230   < > 257   BANDS PCT %  --   --  6   SEGS PCT % 68  --   --    LYMPHO PCT % 19  --  4*   MONO PCT % 9  --  4   EOS PCT % 3  --  0    < > = values in this interval not displayed.     Results from last 7 days   Lab Units 06/11/24  0434 06/10/24  0156 06/09/24 1952   SODIUM mmol/L 137   < > 137   POTASSIUM mmol/L 3.8   < > 3.8   CHLORIDE mmol/L 106   < > 103   CO2 mmol/L 23   < > 20*   BUN mg/dL 11   < > 18   CREATININE mg/dL 1.05   < > 1.02   ANION GAP mmol/L 8   < > 14*   CALCIUM mg/dL 8.6   < > 9.2   ALBUMIN g/dL  --   --  3.8   TOTAL BILIRUBIN mg/dL  --   --  0.33   ALK PHOS U/L  --   --  75   ALT U/L  --   --  9   AST U/L  --   --  14   GLUCOSE RANDOM mg/dL 107    < > 208*    < > = values in this interval not displayed.     Results from last 7 days   Lab Units 06/09/24 1952   INR  1.03     Results from last 7 days   Lab Units 06/12/24  0738 06/11/24  2112 06/11/24  1625 06/11/24  1143 06/11/24  0743 06/10/24  2049 06/10/24  1620 06/10/24  1533 06/10/24  1108 06/10/24  0756 06/10/24  0022   POC GLUCOSE mg/dl 96 123 148* 132 109 137 125 153* 119 115 176*     Results from last 7 days   Lab Units 06/09/24 1952   HEMOGLOBIN A1C % 6.2*     Results from last 7 days   Lab Units 06/11/24  0434 06/10/24  0505 06/10/24  0152 06/10/24  0147 06/09/24  2148 06/09/24 1952   LACTIC ACID mmol/L  --  1.6 2.1*  --  2.9* 3.9*   PROCALCITONIN ng/ml 1.64*  --   --  2.01*  --  0.51*       Lines/Drains:  Invasive Devices       Peripheral Intravenous Line  Duration             Peripheral IV 06/09/24 Right;Ventral (anterior) Forearm 2 days              Drain  Duration             External Urinary Catheter <1 day                          Imaging: Reviewed radiology reports from this admission including: chest CT scan and abdominal/pelvic CT    Recent Cultures (last 7 days):   Results from last 7 days   Lab Units 06/09/24  2108 06/09/24  2004 06/09/24 1952   BLOOD CULTURE   --   --  No Growth at 48 hrs.  No Growth at 48 hrs.   GRAM STAIN RESULT  No polys seen*  2+ Gram positive rods*  --   --    URINE CULTURE   --  >100,000 cfu/ml Escherichia coli*  >100,000 cfu/ml Proteus mirabilis*  --    WOUND CULTURE  2+ Growth of Non lactose fermenting gram negative deni*  --   --        Last 24 Hours Medication List:   Current Facility-Administered Medications   Medication Dose Route Frequency Provider Last Rate    acetaminophen  650 mg Oral BID MADDISON Dong      acetaminophen  650 mg Oral Q8H PRN MADDISON Dong      Artificial Tears  1 drop Both Eyes BID MADDISON Dong      cefTRIAXone  1,000 mg Intravenous Q24H Claribel Mackenzie MD 1,000 mg (06/11/24 4477)    cholecalciferol  1,000 Units Oral  Daily Cuiyin Yurik, CRNP      Diclofenac Sodium  2 g Topical TID PRN Cuiyin Yurik, CRNP      docusate sodium  100 mg Oral BID Gildardo Gunderson MD      donepezil  5 mg Oral HS Cuiyin Yurik, CRNP      enoxaparin  30 mg Subcutaneous Daily Cuiyin Yurik, CRNP      insulin glargine  15 Units Subcutaneous QAM Cuiyin Yurik, CRNP      insulin lispro  1-5 Units Subcutaneous TID AC Cuiyin Yurik, CRNP      insulin lispro  1-5 Units Subcutaneous HS Cuiyin Yurik, CRNP      labetalol  10 mg Intravenous Q4H PRN Felicitas Murphy DO      lactated ringers  50 mL/hr Intravenous Continuous Cuiyin Yurik, CRNP 50 mL/hr (06/10/24 0030)    loperamide  2 mg Oral Daily Cuiyin Yurik, CRNP      losartan  50 mg Oral Daily Cuiyin Yurik, CRNP      magnesium Oxide  400 mg Oral Daily Cuiyin Yurik, CRNP      melatonin  4.5 mg Oral HS Cuiyin Yurik, CRNP      memantine  5 mg Oral BID Cuiyin Yurik, CRNP      mirtazapine  15 mg Oral HS Cuiyin Yurik, CRNP      oxybutynin  10 mg Oral Daily Cuiyin Yurik, CRNP      pantoprazole  40 mg Oral Early Morning Cuiyin Yurik, CRNP      PARoxetine  40 mg Oral QAM Cuiyin Yurik, CRNP      polyethylene glycol  17 g Oral Daily PRN Gildardo Gunderson MD      saccharomyces boulardii  250 mg Oral BID Cuiyin Yurik, CRNP      traMADol  25 mg Oral Q8H PRN Cuiyin Yurik, CRNP          Today, Patient Was Seen By: Felicitas Murphy DO    **Please Note: This note may have been constructed using a voice recognition system.**

## 2024-06-12 NOTE — ASSESSMENT & PLAN NOTE
Placed on IV zosyn on admission  Urine culture in March 2024 grew E. coli and Aerococcus urinae, pan susceptible.  Follow-up urine culture  ID switched abx to ceftriaxone  Urine culture growing E. coli and Proteus   Per ID recommendations stopped IV ceftriaxone and transition to p.o. amoxicillin 500 mg every 8 hours to complete a 10-day total course for pyelonephritis, through 6/19/2024.   Patient being discharged to SNF

## 2024-06-12 NOTE — PROGRESS NOTES
Progress Note - Infectious Disease   Sabrina Barclay 89 y.o. female MRN: [id number]  Unit/Bed#: 3 [place] [address] Encounter: [id number]      Impression/Plan:    1.  Sepsis.  Tachycardia, tachypnea, leukocytosis, lactic acidosis.  Due to #2 below.  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  Blood cultures show no growth.  Urine culture growing over 100 K E. coli and Proteus mirabilis.  The patient is clinically improving, afebrile with downtrending white blood cell count.              -continue IV ceftriaxone 1 g every 24 hours              -Follow-up pending blood and urine cultures              -No need to treat superficial wound culture results as there are no clinical signs of infection of the wound              -Repeat CBC tomorrow to monitor treat response              -Monitor fever curve     2.  Right cystitis with ascending pyeloureteritis and pyelonephritis.  UA with pyuria.  Patient presented with nausea, vomiting, fever, chills.  CT A/P shows cystitis with right ascending pyeloureteritis and pyelonephritis.  Urine culture growing over 100 K E. coli and Proteus mirabilis.              -Antibiotics as above              -Follow-up urine and blood cultures     3.  Right breast wound.  Per report this has been chronic.  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  The wound does not appear infected.  A superficial culture was collected but this represents skin aquilino/wound colonization.              -No antibiotics indicated               -Oncology consulted              -Recommend local wound care, goals of care     4.  Type 2 diabetes mellitus.  Hemoglobin A1c 6.6%.  Risk factor for infection.     5.  Dementia.  Patient awake and alert but confused.  Continue home medications.     I have discussed the above management plan to continue ceftriaxone, follow up  urine culture with Dr. Murphy who is in agreement. ID will follow.    Antibiotics:  Ceftriaxone    Subjective:  Patient has no fever, chills, sweats; no nausea, vomiting, diarrhea; no cough, shortness of breath, abdominal pain. Reports burning from right breast wound but it is tolerable. No new symptoms. Asking for something for constipation.    Objective:  Vitals:  Temp:  [97.9 °F (36.6 °C)-98.9 °F (37.2 °C)] 98 °F (36.7 °C)  HR:  [70-75] 75  Resp:  [14-18] 18  BP: (129-199)/(75-86) 165/85  SpO2:  [97 %-99 %] 98 %  Temp (24hrs), Av.4 °F (36.9 °C), Min:97.9 °F (36.6 °C), Max:98.9 °F (37.2 °C)  Current: Temperature: 98 °F (36.7 °C)    Physical Exam:   General Appearance:  Appearing well, nontoxic, pleasant and interactive but confused    Throat: Oropharynx moist without lesions.    Lungs:   Clear to auscultation bilaterally; no wheezes, rhonchi or rales; respirations unlabored   Heart:  RRR; no murmur, rub or gallop   Abdomen:   Soft, non-tender, non-distended, positive bowel sounds.     Extremities: No clubbing, cyanosis or edema   Skin: Lateral right breast/axillary wound. No cellulitis or purulence        Labs:   All pertinent labs and imaging studies were personally reviewed  Results from last 7 days   Lab Units 24  0434 06/10/24  0745 24   WBC Thousand/uL 6.41 11.00* 13.04*   HEMOGLOBIN g/dL 10.8* 10.0* 11.7   PLATELETS Thousands/uL 230 230 257     Results from last 7 days   Lab Units 24  0434 06/10/24  0156 24   SODIUM mmol/L 137 135 137   POTASSIUM mmol/L 3.8 3.8 3.8   CHLORIDE mmol/L 106 104 103   CO2 mmol/L 23 21 20*   BUN mg/dL 11 13 18   CREATININE mg/dL 1.05 0.93 1.02   EGFR ml/min/1.73sq m 45 52 47   CALCIUM mg/dL 8.6 7.8* 9.2   AST U/L  --   --  14   ALT U/L  --   --  9   ALK PHOS U/L  --   --  75     Results from last 7 days   Lab Units 24  0434 06/10/24  0147 24   PROCALCITONIN ng/ml 1.64* 2.01* 0.51*                   Micro:  Results from last  7 days   Lab Units 06/10/24  0745 06/09/24  2108 06/09/24 2004 06/09/24 1952   BLOOD CULTURE   --   --   --  No Growth at 48 hrs.  No Growth at 48 hrs.   GRAM STAIN RESULT   --  No polys seen*  2+ Gram positive rods*  --   --    URINE CULTURE   --   --  >100,000 cfu/ml Escherichia coli*  >100,000 cfu/ml Proteus mirabilis*  --    WOUND CULTURE   --  4+ Growth of Corynebacterium striatum group*  2+ Growth of Proteus mirabilis*  --   --    MRSA CULTURE ONLY  No Methicillin Resistant Staphlyococcus aureus (MRSA) isolated  --   --   --        Imaging:  I have personally reviewed pertinent imaging study reports and images in PACS.     CT C/A/P with contrast shows new right breast mass with inflammatory changes of the right axilla and chest wall, cystitis with right ascending pyeloureteritis and pyelonephritis

## 2024-06-13 LAB
ALBUMIN SERPL BCP-MCNC: 3.4 G/DL (ref 3.5–5)
ANION GAP SERPL CALCULATED.3IONS-SCNC: 7 MMOL/L (ref 4–13)
BACTERIA WND AEROBE CULT: ABNORMAL
BACTERIA WND AEROBE CULT: ABNORMAL
BUN SERPL-MCNC: 10 MG/DL (ref 5–25)
CALCIUM ALBUM COR SERPL-MCNC: 9.5 MG/DL (ref 8.3–10.1)
CALCIUM SERPL-MCNC: 9 MG/DL (ref 8.4–10.2)
CHLORIDE SERPL-SCNC: 103 MMOL/L (ref 96–108)
CO2 SERPL-SCNC: 26 MMOL/L (ref 21–32)
CREAT SERPL-MCNC: 0.91 MG/DL (ref 0.6–1.3)
ERYTHROCYTE [DISTWIDTH] IN BLOOD BY AUTOMATED COUNT: 13.2 % (ref 11.6–15.1)
GFR SERPL CREATININE-BSD FRML MDRD: 54 ML/MIN/1.73SQ M
GLUCOSE SERPL-MCNC: 105 MG/DL (ref 65–140)
GLUCOSE SERPL-MCNC: 110 MG/DL (ref 65–140)
GLUCOSE SERPL-MCNC: 126 MG/DL (ref 65–140)
GLUCOSE SERPL-MCNC: 127 MG/DL (ref 65–140)
GLUCOSE SERPL-MCNC: 133 MG/DL (ref 65–140)
GRAM STN SPEC: ABNORMAL
GRAM STN SPEC: ABNORMAL
HCT VFR BLD AUTO: 35.7 % (ref 34.8–46.1)
HGB BLD-MCNC: 11.4 G/DL (ref 11.5–15.4)
MAGNESIUM SERPL-MCNC: 1.7 MG/DL (ref 1.9–2.7)
MCH RBC QN AUTO: 29.6 PG (ref 26.8–34.3)
MCHC RBC AUTO-ENTMCNC: 31.9 G/DL (ref 31.4–37.4)
MCV RBC AUTO: 93 FL (ref 82–98)
PHOSPHATE SERPL-MCNC: 3.5 MG/DL (ref 2.3–4.1)
PLATELET # BLD AUTO: 284 THOUSANDS/UL (ref 149–390)
PMV BLD AUTO: 9.6 FL (ref 8.9–12.7)
POTASSIUM SERPL-SCNC: 3.7 MMOL/L (ref 3.5–5.3)
RBC # BLD AUTO: 3.85 MILLION/UL (ref 3.81–5.12)
SODIUM SERPL-SCNC: 136 MMOL/L (ref 135–147)
WBC # BLD AUTO: 4.67 THOUSAND/UL (ref 4.31–10.16)

## 2024-06-13 PROCEDURE — 80069 RENAL FUNCTION PANEL: CPT | Performed by: STUDENT IN AN ORGANIZED HEALTH CARE EDUCATION/TRAINING PROGRAM

## 2024-06-13 PROCEDURE — 83735 ASSAY OF MAGNESIUM: CPT | Performed by: STUDENT IN AN ORGANIZED HEALTH CARE EDUCATION/TRAINING PROGRAM

## 2024-06-13 PROCEDURE — 85027 COMPLETE CBC AUTOMATED: CPT | Performed by: STUDENT IN AN ORGANIZED HEALTH CARE EDUCATION/TRAINING PROGRAM

## 2024-06-13 PROCEDURE — 82948 REAGENT STRIP/BLOOD GLUCOSE: CPT

## 2024-06-13 PROCEDURE — 99233 SBSQ HOSP IP/OBS HIGH 50: CPT | Performed by: STUDENT IN AN ORGANIZED HEALTH CARE EDUCATION/TRAINING PROGRAM

## 2024-06-13 PROCEDURE — 99232 SBSQ HOSP IP/OBS MODERATE 35: CPT | Performed by: STUDENT IN AN ORGANIZED HEALTH CARE EDUCATION/TRAINING PROGRAM

## 2024-06-13 RX ORDER — MAGNESIUM SULFATE HEPTAHYDRATE 40 MG/ML
2 INJECTION, SOLUTION INTRAVENOUS ONCE
Status: COMPLETED | OUTPATIENT
Start: 2024-06-13 | End: 2024-06-13

## 2024-06-13 RX ORDER — AMOXICILLIN 250 MG/1
500 CAPSULE ORAL EVERY 8 HOURS SCHEDULED
Status: DISCONTINUED | OUTPATIENT
Start: 2024-06-13 | End: 2024-06-14 | Stop reason: HOSPADM

## 2024-06-13 RX ADMIN — OXYBUTYNIN CHLORIDE 10 MG: 5 TABLET, EXTENDED RELEASE ORAL at 08:57

## 2024-06-13 RX ADMIN — DONEPEZIL HYDROCHLORIDE 5 MG: 5 TABLET ORAL at 22:01

## 2024-06-13 RX ADMIN — Medication 250 MG: at 17:32

## 2024-06-13 RX ADMIN — PANTOPRAZOLE SODIUM 40 MG: 40 TABLET, DELAYED RELEASE ORAL at 05:47

## 2024-06-13 RX ADMIN — SENNOSIDES AND DOCUSATE SODIUM 2 TABLET: 50; 8.6 TABLET ORAL at 17:32

## 2024-06-13 RX ADMIN — MEMANTINE 5 MG: 5 TABLET ORAL at 08:57

## 2024-06-13 RX ADMIN — Medication 250 MG: at 08:56

## 2024-06-13 RX ADMIN — GLYCERIN 1 DROP: .002; .002; .01 SOLUTION/ DROPS OPHTHALMIC at 17:32

## 2024-06-13 RX ADMIN — MIRTAZAPINE 15 MG: 15 TABLET, FILM COATED ORAL at 22:01

## 2024-06-13 RX ADMIN — SENNOSIDES AND DOCUSATE SODIUM 2 TABLET: 50; 8.6 TABLET ORAL at 08:56

## 2024-06-13 RX ADMIN — PAROXETINE HYDROCHLORIDE 40 MG: 20 TABLET, FILM COATED ORAL at 08:57

## 2024-06-13 RX ADMIN — Medication 4.5 MG: at 22:01

## 2024-06-13 RX ADMIN — ACETAMINOPHEN 650 MG: 325 TABLET ORAL at 08:56

## 2024-06-13 RX ADMIN — INSULIN GLARGINE 15 UNITS: 100 INJECTION, SOLUTION SUBCUTANEOUS at 08:57

## 2024-06-13 RX ADMIN — ACETAMINOPHEN 650 MG: 325 TABLET ORAL at 22:00

## 2024-06-13 RX ADMIN — Medication 1000 UNITS: at 08:57

## 2024-06-13 RX ADMIN — LOSARTAN POTASSIUM 50 MG: 50 TABLET, FILM COATED ORAL at 08:57

## 2024-06-13 RX ADMIN — GLYCERIN 1 DROP: .002; .002; .01 SOLUTION/ DROPS OPHTHALMIC at 08:57

## 2024-06-13 RX ADMIN — ENOXAPARIN SODIUM 30 MG: 30 INJECTION SUBCUTANEOUS at 08:56

## 2024-06-13 RX ADMIN — MEMANTINE 5 MG: 5 TABLET ORAL at 17:31

## 2024-06-13 RX ADMIN — AMOXICILLIN 500 MG: 250 CAPSULE ORAL at 22:00

## 2024-06-13 RX ADMIN — MAGNESIUM SULFATE HEPTAHYDRATE 2 G: 40 INJECTION, SOLUTION INTRAVENOUS at 16:50

## 2024-06-13 RX ADMIN — AMOXICILLIN 500 MG: 250 CAPSULE ORAL at 16:09

## 2024-06-13 RX ADMIN — Medication 400 MG: at 08:57

## 2024-06-13 NOTE — PROGRESS NOTES
[facility]  Progress Note  Name: Sabrina Barclay I  MRN: [id number]  Unit/Bed#: 3 [address] Date of Admission: 6/9/2024   Date of Service: 6/13/2024 I Hospital Day: 4    Assessment & Plan   * Wound of right breast  Assessment & Plan  Right breast wound.  ID recommends stopping zosyn as wound does not appear infection. 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 No evidence of intrathoracic metastatic disease.   Oncology consulted  Per oncology if family would like to pursue a biopsy (and pathology is ER+) then we could consider placing patient on an AI.     UTI (urinary tract infection)  Assessment & Plan  Placed on IV zosyn on admission  Urine culture in March 2024 grew E. coli and Aerococcus urinae, pan susceptible.  Follow-up urine culture  ID switched abx to ceftriaxone  Urine culture growing E. coli and Proteus   Per ID recommendations stopped IV ceftriaxone and transition to p.o. amoxicillin 500 mg every 8 hours to complete a 10-day total course for pyelonephritis, through 6/19/2024.   Pending safe dispo      Constipation  Assessment & Plan  Bowel regimen ordered.    CKD (chronic kidney disease) stage 3, GFR 30-59 ml/min ([facility])  Assessment & Plan  Baseline creatinine appears to be around 1.1  Creatinine stable  Monitor    Dementia ([facility])  Assessment & Plan  Continue Aricept Namenda     Depression  Assessment & Plan  Continue Paxil and Remeron    Type 2 diabetes mellitus, with long-term current use of insulin ([facility])  Assessment & Plan  Lab Results   Component Value Date    HGBA1C 6.2 (H) 06/09/2024       Recent Labs     06/11/24  1143 06/11/24  1625 06/11/24  2112 06/12/24  0738   POCGLU 132 148* 123 96         Blood Sugar Average: Last 72 hrs:  (P)  [id number]    On Lantus 15 units subcu daily, metformin in nursing home.  Continue Lantus 15 units subcu daily in the morning  Hold metformin while inpatient  SSI  Diabetic diet      Benign essential hypertension  Assessment & Plan  Continue losartan with holding parameter  BP stable               VTE Pharmacologic Prophylaxis: VTE Score: 3 Moderate Risk (Score 3-4) - Pharmacological DVT Prophylaxis Ordered: enoxaparin (Lovenox).    Mobility:   Basic Mobility Inpatient Raw Score: 16  JH-HLM Goal: 5: Stand one or more mins  JH-HLM Achieved: 2: Bed activities/Dependent transfer  JH-HLM Goal NOT achieved. Continue with multidisciplinary rounding and encourage appropriate mobility to improve upon JH-HLM goals.    Patient Centered Rounds: I performed bedside rounds with nursing staff today.   Discussions with Specialists or Other Care Team Provider: case management, id    Education and Discussions with Family / Patient: Attempted to update  (son) via phone. Unable to contact.    Total Time Spent on Date of Encounter in care of patient: 25 mins. This time was spent on one or more of the following: performing physical exam; counseling and coordination of care; obtaining or reviewing history; documenting in the medical record; reviewing/ordering tests, medications or procedures; communicating with other healthcare professionals and discussing with patient's family/caregivers.    Current Length of Stay: 4 day(s)  Current Patient Status: Inpatient   Certification Statement: The patient will continue to require additional inpatient hospital stay due to pending safe dispo  Discharge Plan: Anticipate discharge later today or tomorrow to rehab facility.    Code Status: Level 3 - DNAR and DNI    Subjective:   Pt currently has no acute complaints. Denies any fevers or chills    Objective:     Vitals:   Temp (24hrs), Av.3 °F (36.8 °C), Min:97.6 °F (36.4 °C), Max:98.8 °F (37.1 °C)    Temp:  [97.6 °F (36.4  °C)-98.8 °F (37.1 °C)] 97.6 °F (36.4 °C)  HR:  [71-82] 82  Resp:  [18] 18  BP: (135-189)/(57-94) 150/94  SpO2:  [95 %-99 %] 99 %  Body mass index is 24.45 kg/m².     Input and Output Summary (last 24 hours):     Intake/Output Summary (Last 24 hours) at 6/13/2024 1602  Last data filed at 6/13/2024 0600  Gross per 24 hour   Intake --   Output 400 ml   Net -400 ml       Physical Exam:   Physical Exam  Vitals and nursing note reviewed.   Constitutional:       General: She is not in acute distress.     Appearance: She is well-developed.   HENT:      Head: Normocephalic and atraumatic.   Eyes:      Conjunctiva/sclera: Conjunctivae normal.   Cardiovascular:      Rate and Rhythm: Normal rate and regular rhythm.      Heart sounds: No murmur heard.  Pulmonary:      Effort: Pulmonary effort is normal. No respiratory distress.      Breath sounds: Normal breath sounds.   Abdominal:      Palpations: Abdomen is soft.      Tenderness: There is no abdominal tenderness.   Musculoskeletal:         General: No swelling.      Cervical back: Neck supple.   Skin:     General: Skin is warm and dry.      Comments: No new rashes   Neurological:      Mental Status: She is alert. Mental status is at baseline.   Psychiatric:         Mood and Affect: Mood normal.          Additional Data:     Labs:  Results from last 7 days   Lab Units 06/13/24  0910 06/11/24  0434 06/10/24  0745 06/09/24 1952   WBC Thousand/uL 4.67 6.41   < > 13.04*   HEMOGLOBIN g/dL 11.4* 10.8*   < > 11.7   HEMATOCRIT % 35.7 34.3*   < > 36.4   PLATELETS Thousands/uL 284 230   < > 257   BANDS PCT %  --   --   --  6   SEGS PCT %  --  68  --   --    LYMPHO PCT %  --  19  --  4*   MONO PCT %  --  9  --  4   EOS PCT %  --  3  --  0    < > = values in this interval not displayed.     Results from last 7 days   Lab Units 06/13/24  0910 06/10/24  0156 06/09/24 1952   SODIUM mmol/L 136   < > 137   POTASSIUM mmol/L 3.7   < > 3.8   CHLORIDE mmol/L 103   < > 103   CO2 mmol/L 26   < >  20*   BUN mg/dL 10   < > 18   CREATININE mg/dL 0.91   < > 1.02   ANION GAP mmol/L 7   < > 14*   CALCIUM mg/dL 9.0   < > 9.2   ALBUMIN g/dL 3.4*  --  3.8   TOTAL BILIRUBIN mg/dL  --   --  0.33   ALK PHOS U/L  --   --  75   ALT U/L  --   --  9   AST U/L  --   --  14   GLUCOSE RANDOM mg/dL 133   < > 208*    < > = values in this interval not displayed.     Results from last 7 days   Lab Units 06/09/24 1952   INR  1.03     Results from last 7 days   Lab Units 06/13/24  1102 06/13/24  0719 06/12/24  2042 06/12/24  1651 06/12/24  1128 06/12/24  0738 06/11/24  2112 06/11/24  1625 06/11/24  1143 06/11/24  0743 06/10/24  2049 06/10/24  1620   POC GLUCOSE mg/dl 126 110 124 135 146* 96 123 148* 132 109 137 125     Results from last 7 days   Lab Units 06/09/24 1952   HEMOGLOBIN A1C % 6.2*     Results from last 7 days   Lab Units 06/11/24  0434 06/10/24  0505 06/10/24  0152 06/10/24  0147 06/09/24  2148 06/09/24 1952   LACTIC ACID mmol/L  --  1.6 2.1*  --  2.9* 3.9*   PROCALCITONIN ng/ml 1.64*  --   --  2.01*  --  0.51*       Lines/Drains:  Invasive Devices       Peripheral Intravenous Line  Duration             Peripheral IV 06/13/24 Right;Ventral (anterior) Forearm <1 day              Drain  Duration             External Urinary Catheter <1 day                          Imaging: Reviewed radiology reports from this admission including: chest CT scan and abdominal/pelvic CT    Recent Cultures (last 7 days):   Results from last 7 days   Lab Units 06/09/24  2108 06/09/24 2004 06/09/24 1952   BLOOD CULTURE   --   --  No Growth at 72 hrs.  No Growth at 72 hrs.   GRAM STAIN RESULT  No polys seen*  2+ Gram positive rods*  --   --    URINE CULTURE   --  >100,000 cfu/ml Escherichia coli*  >100,000 cfu/ml Proteus mirabilis*  --    WOUND CULTURE  4+ Growth of Corynebacterium striatum group*  2+ Growth of Proteus mirabilis*  --   --        Last 24 Hours Medication List:   Current Facility-Administered Medications   Medication Dose  Route Frequency Provider Last Rate    acetaminophen  650 mg Oral BID Cuiyin Yurik, CRNP      acetaminophen  650 mg Oral Q8H PRN Cuiyin Yurik, CRNP      amoxicillin  500 mg Oral Q8H [facility] Felicitas Murphy DO      Artificial Tears  1 drop Both Eyes BID Cuiyin Yurik, CRNP      cholecalciferol  1,000 Units Oral Daily Cuiyin Yurik, CRNP      Diclofenac Sodium  2 g Topical TID PRN Cuiyin Yurik, CRNP      donepezil  5 mg Oral HS Cuiyin Yurik, CRNP      enoxaparin  30 mg Subcutaneous Daily Cuiyin Yurik, CRNP      insulin glargine  15 Units Subcutaneous QAM Cuiyin Yurik, CRNP      insulin lispro  1-5 Units Subcutaneous TID AC Cuiyin Yurik, CRNP      insulin lispro  1-5 Units Subcutaneous HS Cuiyin Yurik, CRNP      labetalol  10 mg Intravenous Q4H PRN Felicitas Murphy DO      lactated ringers  50 mL/hr Intravenous Continuous Cuiyin Yurik, CRNP 50 mL/hr (06/10/24 0030)    losartan  50 mg Oral Daily Cuiyin Yurik, CRNP      magnesium Oxide  400 mg Oral Daily Cuiyin Yurik, CRNP      melatonin  4.5 mg Oral HS Cuiyin Yurik, CRNP      memantine  5 mg Oral BID Cuiyin Yurik, CRNP      mirtazapine  15 mg Oral HS Cuiyin Yurik, CRNP      oxybutynin  10 mg Oral Daily Cuiyin Yurik, CRNP      pantoprazole  40 mg Oral Early Morning Cuiyin Yurik, CRNP      PARoxetine  40 mg Oral QAM Cuiyin Yurik, CRNP      polyethylene glycol  17 g Oral Daily PRN Gildardo Gunderson MD      saccharomyces boulardii  250 mg Oral BID Cuiyin Yurik, CRNP      senna-docusate sodium  2 tablet Oral BID Felicitas Murphy,       traMADol  25 mg Oral Q8H PRN Cuiyin Yurik, CRNP          Today, Patient Was Seen By: Felicitas Murphy DO    **Please Note: This note may have been constructed using a voice recognition system.**

## 2024-06-13 NOTE — CASE MANAGEMENT
Case Management Progress Note    Patient name Sabrina Barclay  Location 3 [place] [address] [place] [address]-* MRN [id number]  : 3/25/1930 Date 2024       LOS (days): 4  Geometric Mean LOS (GMLOS) (days): 3.6  Days to GMLOS:-0.2        OBJECTIVE:        Current admission status: Inpatient  Preferred Pharmacy:   [facility] [address] [address]  Phone: [phone] Fax: [phone]    Primary Care Provider: No primary care provider on file.    Primary Insurance: MEDICARE  Secondary Insurance: [employer] [facility]    PROGRESS NOTE:    2 VM's left at [facility] for admissions nursing today to facilitate patient's return. No CB at this time.    Right arm;

## 2024-06-13 NOTE — PLAN OF CARE
Problem: Knowledge Deficit  Goal: Patient/family/caregiver demonstrates understanding of disease process, treatment plan, medications, and discharge instructions  Description: Complete learning assessment and assess knowledge base.  Interventions:  - Provide teaching at level of understanding  - Provide teaching via preferred learning methods  Outcome: Progressing     Problem: INFECTION - ADULT  Goal: Absence or prevention of progression during hospitalization  Description: INTERVENTIONS:  - Assess and monitor for signs and symptoms of infection  - Monitor lab/diagnostic results  - Monitor all insertion sites, i.e. indwelling lines, tubes, and drains  - Monitor endotracheal if appropriate and nasal secretions for changes in amount and color  - Stout appropriate cooling/warming therapies per order  - Administer medications as ordered  - Instruct and encourage patient and family to use good hand hygiene technique  - Identify and instruct in appropriate isolation precautions for identified infection/condition  Outcome: Progressing     Problem: SAFETY ADULT  Goal: Patient will remain free of falls  Description: INTERVENTIONS:  - Educate patient/family on patient safety including physical limitations  - Instruct patient to call for assistance with activity   - Consult OT/PT to assist with strengthening/mobility   - Keep Call bell within reach  - Keep bed low and locked with side rails adjusted as appropriate  - Keep care items and personal belongings within reach  - Initiate and maintain comfort rounds  - Make Fall Risk Sign visible to staff  - Offer Toileting every 2 Hours, in advance of need  - Initiate/Maintain bed alarm  - Obtain necessary fall risk management equipment: yellow socks  - Apply yellow socks and bracelet for high fall risk patients  - Consider moving patient to room near nurses station  Outcome: Progressing  Goal: Maintain or return to baseline ADL function  Description: INTERVENTIONS:  -  Assess  patient's ability to carry out ADLs; assess patient's baseline for ADL function and identify physical deficits which impact ability to perform ADLs (bathing, care of mouth/teeth, toileting, grooming, dressing, etc.)  - Assess/evaluate cause of self-care deficits   - Assess range of motion  - Assess patient's mobility; develop plan if impaired  - Assess patient's need for assistive devices and provide as appropriate  - Encourage maximum independence but intervene and supervise when necessary  - Involve family in performance of ADLs  - Assess for home care needs following discharge   - Consider OT consult to assist with ADL evaluation and planning for discharge  - Provide patient education as appropriate  Outcome: Progressing  Goal: Maintains/Returns to pre admission functional level  Description: INTERVENTIONS:  - Perform AM-PAC 6 Click Basic Mobility/ Daily Activity assessment daily.  - Set and communicate daily mobility goal to care team and patient/family/caregiver.   - Collaborate with rehabilitation services on mobility goals if consulted  - Perform Range of Motion 3 times a day.  - Reposition patient every 2 hours.  - Dangle patient 3 times a day  - Stand patient 3 times a day  - Ambulate patient 3 times a day  - Out of bed to chair 3 times a day   - Out of bed for meals 3 times a day  - Out of bed for toileting  - Record patient progress and toleration of activity level   Outcome: Progressing     Problem: GENITOURINARY - ADULT  Goal: Maintains or returns to baseline urinary function  Description: INTERVENTIONS:  - Assess urinary function  - Encourage oral fluids to ensure adequate hydration if ordered  - Administer IV fluids as ordered to ensure adequate hydration  - Administer ordered medications as needed  - Offer frequent toileting  - Follow urinary retention protocol if ordered  Outcome: Progressing  Goal: Absence of urinary retention  Description: INTERVENTIONS:  - Assess patient’s ability to void and  empty bladder  - Monitor I/O  - Bladder scan as needed  - Discuss with physician/AP medications to alleviate retention as needed  - Discuss catheterization for long term situations as appropriate  Outcome: Progressing     Problem: METABOLIC, FLUID AND ELECTROLYTES - ADULT  Goal: Glucose maintained within target range  Description: INTERVENTIONS:  - Monitor Blood Glucose as ordered  - Assess for signs and symptoms of hyperglycemia and hypoglycemia  - Administer ordered medications to maintain glucose within target range  - Assess nutritional intake and initiate nutrition service referral as needed  Outcome: Progressing     Problem: SKIN/TISSUE INTEGRITY - ADULT  Goal: Skin Integrity remains intact(Skin Breakdown Prevention)  Description: Assess:  -Perform Asim assessment every shift  -Clean and moisturize skin every shift  -Inspect skin when repositioning, toileting, and assisting with ADLS  -Assess under medical devices such as IV sites every shift  -Assess extremities for adequate circulation and sensation     Bed Management:  -Have minimal linens on bed & keep smooth, unwrinkled  -Change linens as needed when moist or perspiring  -Avoid sitting or lying in one position for more than 2 hours while in bed  -Keep HOB at 30degrees     Toileting:  -Offer bedside commode  -Assess for incontinence every shift  -Use incontinent care products after each incontinent episode such as barrier creams    Activity:  -Mobilize patient 3 times a day  -Encourage activity and walks on unit  -Encourage or provide ROM exercises   -Turn and reposition patient every 2 Hours  -Use appropriate equipment to lift or move patient in bed  -Instruct/ Assist with weight shifting every shift when out of bed in chair  -Consider limitation of chair time 2 hour intervals    Skin Care:  -Avoid use of baby powder, tape, friction and shearing, hot water or constrictive clothing  -Relieve pressure over bony prominences using waffle cushion  -Do not  massage red bony areas    Next Steps:  -Teach patient strategies to minimize risks such as repsotioning   -Consider consults to  interdisciplinary teams such as PT/OT  Outcome: Progressing     Problem: Nutrition/Hydration-ADULT  Goal: Nutrient/Hydration intake appropriate for improving, restoring or maintaining nutritional needs  Description: Monitor and assess patient's nutrition/hydration status for malnutrition. Collaborate with interdisciplinary team and initiate plan and interventions as ordered.  Monitor patient's weight and dietary intake as ordered or per policy. Utilize nutrition screening tool and intervene as necessary. Determine patient's food preferences and provide high-protein, high-caloric foods as appropriate.     INTERVENTIONS:  - Monitor oral intake, urinary output, labs, and treatment plans  - Assess nutrition and hydration status and recommend course of action  - Evaluate amount of meals eaten  - Assist patient with eating if necessary   - Allow adequate time for meals  - Recommend/ encourage appropriate diets, oral nutritional supplements, and vitamin/mineral supplements  - Order, calculate, and assess calorie counts as needed  - Recommend, monitor, and adjust tube feedings and TPN/PPN based on assessed needs  - Assess need for intravenous fluids  - Provide specific nutrition/hydration education as appropriate  - Include patient/family/caregiver in decisions related to nutrition  Outcome: Progressing

## 2024-06-13 NOTE — PLAN OF CARE
Problem: INFECTION - ADULT  Goal: Absence or prevention of progression during hospitalization  Description: INTERVENTIONS:  - Assess and monitor for signs and symptoms of infection  - Monitor lab/diagnostic results  - Monitor all insertion sites, i.e. indwelling lines, tubes, and drains  - Monitor endotracheal if appropriate and nasal secretions for changes in amount and color  - [place] appropriate cooling/warming therapies per order  - Administer medications as ordered  - Instruct and encourage patient and family to use good hand hygiene technique  - Identify and instruct in appropriate isolation precautions for identified infection/condition  Outcome: Progressing     Problem: SAFETY ADULT  Goal: Patient will remain free of falls  Description: INTERVENTIONS:  - Educate patient/family on patient safety including physical limitations  - Instruct patient to call for assistance with activity   - Consult OT/PT to assist with strengthening/mobility   - Keep Call bell within reach  - Keep bed low and locked with side rails adjusted as appropriate  - Keep care items and personal belongings within reach  - Initiate and maintain comfort rounds  - Make Fall Risk Sign visible to staff  - Offer Toileting every  Hours, in advance of need  - Initiate/Maintain bed alarm  - Obtain necessary fall risk management equipment:   - Apply yellow socks and bracelet for high fall risk patients  - Consider moving patient to room near nurses station  Outcome: Progressing     Problem: GENITOURINARY - ADULT  Goal: Maintains or returns to baseline urinary function  Description: INTERVENTIONS:  - Assess urinary function  - Encourage oral fluids to ensure adequate hydration if ordered  - Administer IV fluids as ordered to ensure adequate hydration  - Administer ordered medications as needed  - Offer frequent toileting  - Follow urinary retention protocol if ordered  Outcome: Progressing     Problem: METABOLIC, FLUID AND ELECTROLYTES -  ADULT  Goal: Glucose maintained within target range  Description: INTERVENTIONS:  - Monitor Blood Glucose as ordered  - Assess for signs and symptoms of hyperglycemia and hypoglycemia  - Administer ordered medications to maintain glucose within target range  - Assess nutritional intake and initiate nutrition service referral as needed  Outcome: Progressing

## 2024-06-13 NOTE — PROGRESS NOTES
Progress Note - Infectious Disease   Sabrina Barclay 89 y.o. female MRN: [id number]  Unit/Bed#: [address] [place] [address] Encounter: [id number]      Impression/Plan:  1.  Sepsis.  Tachycardia, tachypnea, leukocytosis, lactic acidosis.  Due to #2 below.  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  Blood cultures show no growth.  Urine culture growing over 100 K E. coli and Proteus mirabilis.  The patient is clinically improving, afebrile with downtrending white blood cell count.  -antibiotic as below  -Follow-up final blood cultures  -No need to treat superficial wound culture results as there are no clinical signs of infection of the wound  -monitoring CBC for treat response  -Monitor fever curve     2.  Right cystitis with ascending pyeloureteritis and pyelonephritis.  UA with pyuria.  Patient presented with nausea, vomiting, fever, chills.  CT A/P shows cystitis with right ascending pyeloureteritis and pyelonephritis.  Urine culture growing over 100 K E. coli pansensitive and Proteus mirabilis resistant to quinolones, tetracycline and nitrofurantoin  -transition to Amoxicillin 500 mg PO q 8 hours to complete total 10 day antibiotic course through 6/19/24  -Follow-up urine and blood cultures     3.  Right breast wound.  Per report this has been chronic.  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  The wound does not appear infected.  A superficial culture was collected but this represents skin aquilino/wound colonization.  -No antibiotics indicated   -Oncology consulted  -Recommend local wound care, goals of care     4.  Type 2 diabetes mellitus.  Hemoglobin A1c 6.6%.  Risk factor for infection.     5.  Dementia.  Patient awake and alert but confused.  Continue home medications.     Above impression and plan discussed in detail with patient, RN, and Dr. Murphy of primary care  team. They concur with ID treatment plan and followup     Antibiotics:  Ceftriaxone - abx D4     Subjective:  Patient has no fever, chills, sweats; no nausea, vomiting, diarrhea; no cough, shortness of breath, abdominal pain. No new symptoms.  Objective:  Vitals:  Temp:  [97.6 °F (36.4 °C)-98.8 °F (37.1 °C)] 97.6 °F (36.4 °C)  HR:  [71-82] 82  Resp:  [18] 18  BP: (135-189)/(57-94) 150/94  SpO2:  [95 %-99 %] 99 %  Temp (24hrs), Av.3 °F (36.8 °C), Min:97.6 °F (36.4 °C), Max:98.8 °F (37.1 °C)  Current: Temperature: 97.6 °F (36.4 °C)    Physical Exam:   General Appearance:  89 year old female, resting propped fairly comfortably in bed, nontoxic appearance, ate most of chicken noodle soup, no acute distress.   HEENT: Atraumatic normocephalic   Throat: Oropharynx moist.    Pulmonary:   Normal respiratory excursion without accessory muscle use   Cardiac:  RRR   Abdomen:   Soft, non-tender, non-distended   Extremities: No edema   : Purewick with clear, yellow urine in canister, no SPT   Psychiatric: Awake, cooperative   Skin: No new rashes. IV site nontender.        Labs, Imaging, & Other studies:   All pertinent labs and imaging studies were personally reviewed  Results from last 7 days   Lab Units 24  0910 24  0434 06/10/24  0745   WBC Thousand/uL 4.67 6.41 11.00*   HEMOGLOBIN g/dL 11.4* 10.8* 10.0*   PLATELETS Thousands/uL 284 230 230     Results from last 7 days   Lab Units 24  0910 24  0434 06/10/24  0156 24  1952   SODIUM mmol/L 136 137 135 137   POTASSIUM mmol/L 3.7 3.8 3.8 3.8   CHLORIDE mmol/L 103 106 104 103   CO2 mmol/L 26 23 21 20*   BUN mg/dL 10 11 13 18   CREATININE mg/dL 0.91 1.05 0.93 1.02   EGFR ml/min/1.73sq m 54 45 52 47   CALCIUM mg/dL 9.0 8.6 7.8* 9.2   AST U/L  --   --   --  14   ALT U/L  --   --   --  9   ALK PHOS U/L  --   --   --  75     Results from last 7 days   Lab Units 06/10/24  0745 24  2108 24   BLOOD CULTURE   --   --   --   No Growth at 72 hrs.  No Growth at 72 hrs.   GRAM STAIN RESULT   --  No polys seen*  2+ Gram positive rods*  --   --    URINE CULTURE   --   --  >100,000 cfu/ml Escherichia coli*  >100,000 cfu/ml Proteus mirabilis*  --    WOUND CULTURE   --  4+ Growth of Corynebacterium striatum group*  2+ Growth of Proteus mirabilis*  --   --    MRSA CULTURE ONLY  No Methicillin Resistant Staphlyococcus aureus (MRSA) isolated  --   --   --      Results from last 7 days   Lab Units 06/11/24  0434 06/10/24  0147 06/09/24 1952   PROCALCITONIN ng/ml 1.64* 2.01* 0.51*

## 2024-06-14 VITALS
SYSTOLIC BLOOD PRESSURE: 144 MMHG | HEIGHT: 64 IN | TEMPERATURE: 98.4 F | BODY MASS INDEX: 24.31 KG/M2 | OXYGEN SATURATION: 94 % | HEART RATE: 78 BPM | DIASTOLIC BLOOD PRESSURE: 70 MMHG | RESPIRATION RATE: 18 BRPM | WEIGHT: 142.42 LBS

## 2024-06-14 PROBLEM — N12 PYELONEPHRITIS: Status: ACTIVE | Noted: 2024-06-14

## 2024-06-14 LAB
GLUCOSE SERPL-MCNC: 107 MG/DL (ref 65–140)
GLUCOSE SERPL-MCNC: 212 MG/DL (ref 65–140)

## 2024-06-14 PROCEDURE — 99239 HOSP IP/OBS DSCHRG MGMT >30: CPT | Performed by: STUDENT IN AN ORGANIZED HEALTH CARE EDUCATION/TRAINING PROGRAM

## 2024-06-14 PROCEDURE — 82948 REAGENT STRIP/BLOOD GLUCOSE: CPT

## 2024-06-14 RX ORDER — AMOXICILLIN 500 MG/1
500 CAPSULE ORAL EVERY 8 HOURS SCHEDULED
Qty: 18 CAPSULE | Refills: 0 | Status: SHIPPED | OUTPATIENT
Start: 2024-06-14 | End: 2024-06-14

## 2024-06-14 RX ORDER — AMOXICILLIN 500 MG/1
500 CAPSULE ORAL EVERY 8 HOURS SCHEDULED
Qty: 18 CAPSULE | Refills: 0 | Status: SHIPPED | OUTPATIENT
Start: 2024-06-14 | End: 2024-06-20

## 2024-06-14 RX ADMIN — GLYCERIN 1 DROP: .002; .002; .01 SOLUTION/ DROPS OPHTHALMIC at 08:59

## 2024-06-14 RX ADMIN — LOSARTAN POTASSIUM 50 MG: 50 TABLET, FILM COATED ORAL at 08:53

## 2024-06-14 RX ADMIN — INSULIN LISPRO 1 UNITS: 100 INJECTION, SOLUTION INTRAVENOUS; SUBCUTANEOUS at 11:54

## 2024-06-14 RX ADMIN — ENOXAPARIN SODIUM 30 MG: 30 INJECTION SUBCUTANEOUS at 09:00

## 2024-06-14 RX ADMIN — Medication 1000 UNITS: at 08:53

## 2024-06-14 RX ADMIN — SENNOSIDES AND DOCUSATE SODIUM 2 TABLET: 50; 8.6 TABLET ORAL at 08:53

## 2024-06-14 RX ADMIN — INSULIN GLARGINE 15 UNITS: 100 INJECTION, SOLUTION SUBCUTANEOUS at 08:57

## 2024-06-14 RX ADMIN — PAROXETINE HYDROCHLORIDE 40 MG: 20 TABLET, FILM COATED ORAL at 08:53

## 2024-06-14 RX ADMIN — Medication 250 MG: at 08:53

## 2024-06-14 RX ADMIN — PANTOPRAZOLE SODIUM 40 MG: 40 TABLET, DELAYED RELEASE ORAL at 05:47

## 2024-06-14 RX ADMIN — AMOXICILLIN 500 MG: 250 CAPSULE ORAL at 05:47

## 2024-06-14 RX ADMIN — ACETAMINOPHEN 650 MG: 325 TABLET ORAL at 08:53

## 2024-06-14 RX ADMIN — OXYBUTYNIN CHLORIDE 10 MG: 5 TABLET, EXTENDED RELEASE ORAL at 08:53

## 2024-06-14 RX ADMIN — Medication 400 MG: at 08:53

## 2024-06-14 RX ADMIN — MEMANTINE 5 MG: 5 TABLET ORAL at 08:53

## 2024-06-14 RX ADMIN — SODIUM CHLORIDE, SODIUM LACTATE, POTASSIUM CHLORIDE, AND CALCIUM CHLORIDE 50 ML/HR: .6; .31; .03; .02 INJECTION, SOLUTION INTRAVENOUS at 06:09

## 2024-06-14 NOTE — DISCHARGE SUMMARY
[facility]  Discharge- Sabrina Barclay 3/25/1930, 89 y.o. female MRN: [id number]  Unit/Bed#: [address] [place] [address] Encounter: [id number]  Primary Care Provider: No primary care provider on file.   Date and time admitted to hospital: 6/9/2024  7:34 PM    Wound of right breast  Assessment & Plan  Right breast wound.  ID recommends stopping zosyn as wound does not appear infection. 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 No evidence of intrathoracic metastatic disease.   Oncology consulted  Per oncology if family would like to pursue a biopsy (and pathology is ER+) then we could consider placing patient on an AI.   Outpatient oncology follow up      * Pyelonephritis  Assessment & Plan  CT abdomen and pelvis:  Cystitis, right ascending pyeloureteritis and mild right pyelonephritis. No abscess or hydronephrosis.  Placed on IV zosyn on admission  ID switched abx to ceftriaxone  Urine culture growing E. coli and Proteus   Per ID recommendations stopped IV ceftriaxone and transition to p.o. amoxicillin 500 mg every 8 hours to complete a 10-day total course for pyelonephritis, through 6/19/2024.   Patient being discharged to [facility]    UTI (urinary tract infection)  Assessment & Plan  Placed on IV zosyn on admission  Urine culture in March 2024 grew E. coli and Aerococcus urinae, pan susceptible.  Follow-up urine culture  ID switched abx to ceftriaxone  Urine culture growing E. coli and Proteus   Per ID recommendations stopped IV ceftriaxone and transition to p.o. amoxicillin 500 mg every 8 hours to complete a 10-day total course for pyelonephritis, through 6/19/2024.   Patient being discharged to [facility]      Constipation  Assessment & Plan  Bowel regimen ordered.    CKD (chronic kidney  disease) stage 3, GFR 30-59 ml/min ([facility])  Assessment & Plan  Baseline creatinine appears to be around 1.1  Creatinine stable  Monitor    Dementia (HCC)  Assessment & Plan  Continue Aricept Namenda     Depression  Assessment & Plan  Continue Paxil and Remeron    Type 2 diabetes mellitus, with long-term current use of insulin (HCC)  Assessment & Plan  Lab Results   Component Value Date    HGBA1C 6.2 (H) 06/09/2024       Recent Labs     06/11/24  1143 06/11/24  1625 06/11/24  2112 06/12/24  0738   POCGLU 132 148* 123 96         Blood Sugar Average: Last 72 hrs:  (P) 130.5906179959741687    Continue home regiment on discharge      Benign essential hypertension  Assessment & Plan  Continue losartan with holding parameter  BP stable        Medical Problems       Resolved Problems  Date Reviewed: 6/14/2024   None       Discharging Physician / Practitioner: Felicitas Murphy DO  PCP: No primary care provider on file.  Admission Date:   Admission Orders (From admission, onward)       Ordered        06/09/24 2207  INPATIENT ADMISSION  Once                          Discharge Date: 06/14/24    Consultations During Hospital Stay:  Oncology  ID  Wound care    Procedures Performed:   None    Significant Findings / Test Results:   Pyelonephritis      Incidental Findings:     CT showed   New 6.1 x 4.1 cm right breast mass, inflammatory changes right axillary and right chest wall/subpectoral adenopathy attributed to malignancy and coby metastasis until proven otherwise. No evidence of intrathoracic metastatic disease.     2.5 x 2.2 cm left breast hypodense nodule enlarged since April 2019. Correlate with any previous work-up.      Per oncology if family would like to pursue a biopsy (and pathology is ER+) then we could consider placing patient on an aromatase inhibitor    CT abdomen and pelvis:  Cystitis, right ascending pyeloureteritis and mild right pyelonephritis. No abscess or hydronephrosis.    Continue amoxicillin 500 mg every 8  "hours to complete a 10-day total course for pyelonephritis, through 6/19/2024.                Test Results Pending at Discharge (will require follow up):   N/a     Outpatient Tests Requested:  F/u with oncology    Complications:  none    Reason for Admission: Sepsis    Hospital Course:   Sabrina Barclay is a 89 y.o. female patient who originally presented to the hospital on 6/9/2024 due to urosepsis.  Patient was found to have right-sided ascending pyelonephritis secondary to E. coli.  Infectious disease was consulted.  Patient was given IV Zosyn in the ER and switched to ceftriaxone on admission.  Urine cultures came back positive for E. coli and patient is being discharged on oral amoxicillin 500 every 8 hours through 6/19/2024 to complete 10-day course of antibiotics.  ID has low suspicion for infection of right breast wound.  Imaging on admission also noted for new right sided breast mass with inflammatory changes.  Oncology was consulted and recommending outpatient follow-up for further discussions regarding plan of care.            Please see above list of diagnoses and related plan for additional information.     Condition at Discharge: stable    Discharge Day Visit / Exam:   Subjective:  Patient pleasantly confused, states feels good, no complaints.  Vitals: Blood Pressure: 142/74 (06/14/24 0023)  Pulse: 72 (06/14/24 0023)  Temperature: 98.4 °F (36.9 °C) (06/14/24 0023)  Temp Source: Oral (06/14/24 0023)  Respirations: 16 (06/14/24 0023)  Height: 5' 4\" (162.6 cm) (06/10/24 0008)  Weight - Scale: 64.6 kg (142 lb 6.7 oz) (06/10/24 0008)  SpO2: 96 % (06/14/24 0023)  Exam:   Physical Exam  Vitals and nursing note reviewed.   Constitutional:       General: She is not in acute distress.     Appearance: She is well-developed.   HENT:      Head: Normocephalic and atraumatic.   Eyes:      Conjunctiva/sclera: Conjunctivae normal.   Cardiovascular:      Rate and Rhythm: Normal rate and regular rhythm.      Heart sounds: " No murmur heard.  Pulmonary:      Effort: Pulmonary effort is normal. No respiratory distress.      Breath sounds: Normal breath sounds.   Abdominal:      Palpations: Abdomen is soft.      Tenderness: There is no abdominal tenderness.   Musculoskeletal:         General: No swelling.      Cervical back: Neck supple.   Skin:     General: Skin is warm and dry.      Comments: Lateral right breast/axillary wound. No cellulitis or purulence    Neurological:      Mental Status: She is alert. Mental status is at baseline. She is disoriented.   Psychiatric:         Mood and Affect: Mood normal.          Discussion with Family: Attempted to update  (son) via phone. Unable to contact.    Discharge instructions/Information to patient and family:   See after visit summary for information provided to patient and family.      Provisions for Follow-Up Care:  See after visit summary for information related to follow-up care and any pertinent home health orders.      Mobility at time of Discharge:   Basic Mobility Inpatient Raw Score: 16  JH-HLM Goal: 5: Stand one or more mins  JH-HLM Achieved: 7: Walk 25 feet or more  HLM Goal achieved. Continue to encourage appropriate mobility.     Disposition:   Other Skilled Nursing Facility at [place]    Planned Readmission: no     Discharge Statement:  I spent 35 minutes discharging the patient. This time was spent on the day of discharge. I had direct contact with the patient on the day of discharge. Greater than 50% of the total time was spent examining patient, answering all patient questions, arranging and discussing plan of care with patient as well as directly providing post-discharge instructions.  Additional time then spent on discharge activities.    Discharge Medications:  See after visit summary for reconciled discharge medications provided to patient and/or family.      **Please Note: This note may have been constructed using a voice recognition system**

## 2024-06-14 NOTE — NURSING NOTE
Pt. Was educated on how to prevent recurrent UTI's and constipation, personal hygiene, diet and hypotension, and wound care. Pt. Expressed understanding. Pt. Was then wheeled with a transporter leaving the Hospital to [facility].

## 2024-06-14 NOTE — ASSESSMENT & PLAN NOTE
CT abdomen and pelvis:  Cystitis, right ascending pyeloureteritis and mild right pyelonephritis. No abscess or hydronephrosis.  Placed on IV zosyn on admission  ID switched abx to ceftriaxone  Urine culture growing E. coli and Proteus   Per ID recommendations stopped IV ceftriaxone and transition to p.o. amoxicillin 500 mg every 8 hours to complete a 10-day total course for pyelonephritis, through 6/19/2024.   Patient being discharged to SNF

## 2024-06-14 NOTE — SPEECH THERAPY NOTE
Records reviewed. Noted plan for return to [facility].  All recommendations placed on AVS in preparation for d/c (and spoke with staff earlier week re: all recommendations)    Melanie Garcia MS CCC-SLP   [place] License [id number]

## 2024-06-14 NOTE — CASE MANAGEMENT
Case Management Assessment & Discharge Planning Note    Patient name Sabrina Barclay  Location [address] [facility] [address]-* MRN [id number]  : 3/25/1930 Date 2024       Current Admission Date: 2024  Current Admission Diagnosis:Pyelonephritis   Patient Active Problem List    Diagnosis Date Noted Date Diagnosed    Pyelonephritis 2024     GERD (gastroesophageal reflux disease) 06/10/2024     Constipation 06/10/2024     Dementia ([facility])      CKD (chronic kidney disease) stage 3, GFR 30-59 ml/min ([facility])      Severe sepsis ([facility]) 2024     UTI (urinary tract infection) 2024     Wound of right breast 2024     Status post fall 2019     Hyponatremia 2019     Benign essential hypertension 2019     Type 2 diabetes mellitus, with long-term current use of insulin ([facility]) 2019     Depression 2019     Hyperlipidemia 2019       LOS (days): 5  Geometric Mean LOS (GMLOS) (days): 3.6  Days to GMLOS:-1     OBJECTIVE:    Risk of Unplanned Readmission Score: 21.28         Current admission status: Inpatient       Preferred Pharmacy:   [facility] [address] [address]  Phone: [phone] Fax: [phone]    Primary Care Provider: No primary care provider on file.    Primary Insurance: MEDICARE  Secondary Insurance: [employer] [facility]    ASSESSMENT:  Active Health Care Proxies       Miguel Angel Barclay [facility] Agent - Ramesh   Primary Phone: [phone] (Home)  Mobile Phone: [phone]            Readmission Root Cause  30 Day Readmission: No    Patient Information  Admitted from:: Facility ([facility] [place])  Mental Status: Alert  During Assessment patient was accompanied by: Not accompanied during assessment  Assessment information provided by:: Patient  Primary Caregiver: Self  Support Systems: Son  County of Residence: [place]  What city do you live in?: [place]  Type of Current Residence:  Facility ([facility] [place])  Upon entering residence, is there a bedroom on the main floor (no further steps)?: Yes  Upon entering residence, is there a bathroom on the main floor (no further steps)?: Yes  Living Arrangements: Other (Comment)  Is patient a ?: No    Activities of Daily Living Prior to Admission  Functional Status: Assistance  Completes ADLs independently?: No  Level of ADL dependence: Assistance  Ambulates independently?: Yes  Does patient use assisted devices?: Yes  Assisted Devices (DME) used: Walker  Does patient currently own DME?: Yes  What DME does the patient currently own?: Walker  Does the patient have a history of Short-Term Rehab?: Yes  Does patient have a history of HHC?: No  Does patient currently have HHC?: No    Patient Information Continued  Income Source: Pension/long-term  Does patient have prescription coverage?: Yes  Does patient receive dialysis treatments?: No  Does patient have a history of substance abuse?: No  Does patient have a history of Mental Health Diagnosis?: No    PHQ 2/9 Screening   Reviewed PHQ 2/9 Depression Screening Score?: No    Means of Transportation  Means of Transport to Appts:: Family transport    Social Determinants of Health (SDOH)      Flowsheet Row Most Recent Value   Housing Stability    In the last 12 months, was there a time when you were not able to pay the mortgage or rent on time? N   In the past 12 months, how many times have you moved where you were living? 0   At any time in the past 12 months, were you homeless or living in a shelter (including now)? N   Transportation Needs    In the past 12 months, has lack of transportation kept you from medical appointments or from getting medications? no   In the past 12 months, has lack of transportation kept you from meetings, work, or from getting things needed for daily living? No   Food Insecurity    Within the past 12 months, you worried that your food would run out before you  got the money to buy more. Never true   Within the past 12 months, the food you bought just didn't last and you didn't have money to get more. Never true   Utilities    In the past 12 months has the electric, gas, oil, or water company threatened to shut off services in your home? No          DISCHARGE DETAILS:    Discharge planning discussed with:: Patient  Freedom of Choice: Yes  Comments - Freedom of Choice: Choice is to return to [facility]  CM contacted family/caregiver?: Yes (VM left for son)  Were Treatment Team discharge recommendations reviewed with patient/caregiver?: Yes  Did patient/caregiver verbalize understanding of patient care needs?: N/A- going to facility  Were patient/caregiver advised of the risks associated with not following Treatment Team discharge recommendations?: Yes    Requested Home Health Care         Is the patient interested in HHC at discharge?: No    DME Referral Provided  Referral made for DME?: No    Other Referral/Resources/Interventions Provided:  Interventions: Facility Return    Would you like to participate in our [facility] service program?  : No - Declined    Treatment Team Recommendation: Facility Return, Assisted Living  Discharge Destination Plan:: Facility Return, Assisted Living ([facility] [place])  Transport at Discharge : Wheelchair van  Dispatcher Contacted: Yes  Number/Name of Dispatcher: ROUNDTRIP  Transported by (Company and Unit #): [id number]  ETA of Transport (Date): 06/14/24  ETA of Transport (Time): 1300     IMM Given (Date):: 06/14/24  IMM Given to:: Patient  IMM reviewed with patient, patient agrees with discharge determination.    Accepting Facility Name, City & State : [facility] [place]  [facility] [place]  Receiving Facility/Agency Phone Number: [phone]  Facility/Agency Fax Number: [phone]  Facility Insurance Auth Number: NO AUTH REQUIRED    Additional Comments: NORMA s/w Ranjana & Emmy to confirm  patient is able to return today. Written ABX Rx faxed to facility, Emmy confirmed receipt of ABX Rx. All in agreement with return to facility with 1300 transport today - all parties aware.

## 2024-06-14 NOTE — [place] UNIVERSAL TRANSFER FORM
"[place] UNIVERSAL TRANSFER FORM  (ALL ITEMS MUST BE COMPLETED)    1. TRANSFER FROM: [facility]      TRANSFER TO: Katie /Mitra at [facility]    2. DATE OF TRANSFER: 6/14/2024                        TIME OF TRANSFER: 1300    3. PATIENT NAME: Sabrina Barclay,        YOB: 1930                             GENDER: female    4. LANGUAGE:   English    5. PHYSICIAN NAME:  Felicitas Murphy DO                   PHONE: [phone]    6. CODE STATUS: Level 3 - DNAR and DNI        Out of Hospital DNR Attached: Yes    7. :                                      :  Extended Emergency Contact Information  Primary Emergency Contact: Miguel Angel Barclay  Address: [address] [place] [address]  Home Phone: [phone]  Mobile Phone: [phone]  Relation: Son           Health Care Representative/Proxy:  Yes           Legal Guardian:  No             NAME OF:           HEALTH CARE REPRESENTATIVE/PROXY:                                         OR           LEGAL GUARDIAN, IF NOT :                                               PHONE:  (Day)           (Night)                        (Cell)    8. REASON FOR TRANSFER: (Must include brief medical history and recent changes in physical function or cognition.) Ready for discharge            V/S: /70 (BP Location: Left arm)   Pulse 78   Temp 98.4 °F (36.9 °C) (Oral)   Resp 18   Ht 5' 4\" (1.626 m)   Wt 64.6 kg (142 lb 6.7 oz)   SpO2 94%   BMI 24.45 kg/m²           PAIN: None    9. PRIMARY DIAGNOSIS: Pyelonephritis      Secondary Diagnosis:         Pacemaker: No      Internal Defib: No          Mental Health Diagnosis (if Applicable):    10. RESTRAINTS: No     11. RESPIRATORY NEEDS: None    12. ISOLATION/PRECAUTION: None    13. ALLERGY: Patient has no known allergies.    14. SENSORY:Limited mobility. Poor vision           15. SKIN CONDITION: yes, right     16. " DIET: {Diet:20783}    17. IV ACCESS: {IV Access:20887}    18. PERSONAL ITEMS SENT WITH PATIENT: {Personal Items:20888}    19. ATTACHED DOCUMENTS: MUST ATTACH CURRENT MEDICATION INFORMATION {Attached Documents:20891}    20. AT RISK ALERTS:{Risks:20892}        HARM TO: {Harm To:20893}    21. WEIGHT BEARING STATUS:         Left Leg: {None/Limited/Full:07236}        Right Leg: {None/Limited/Full:20896}    22. MENTAL STATUS:{Mental Status:20897}    23. FUNCTION:        Walk: {Self/With Help/Not Able:20899}        Transfer: {Self/With Help/Not Able:20899}        Toilet: {Self/With Help/Not Able:20899}        Feed: {Self/With Help/Not Able:20899}    24. IMMUNIZATIONS/SCREENING:     There is no immunization history on file for this patient.    25. BOWEL: {Bowel:20900}    26. BLADDER: {Bladder:20901}    27. SENDING FACILITY CONTACT: ***                  Title: ***        Unit: ***        Phone: ***          REC'G FACILITY CONTACT (if known):        Title:        Unit:         Phone:         FORM PREFILLED BY (if applicable)       Title:       Unit:        Phone:         FORM COMPLETED BY Peggy Hoskins RN      Title: ***      Phone: ***

## 2024-06-14 NOTE — PLAN OF CARE
Problem: Knowledge Deficit  Goal: Patient/family/caregiver demonstrates understanding of disease process, treatment plan, medications, and discharge instructions  Description: Complete learning assessment and assess knowledge base.  Interventions:  - Provide teaching at level of understanding  - Provide teaching via preferred learning methods  Outcome: Adequate for Discharge     Problem: INFECTION - ADULT  Goal: Absence or prevention of progression during hospitalization  Description: INTERVENTIONS:  - Assess and monitor for signs and symptoms of infection  - Monitor lab/diagnostic results  - Monitor all insertion sites, i.e. indwelling lines, tubes, and drains  - Monitor endotracheal if appropriate and nasal secretions for changes in amount and color  - [place] appropriate cooling/warming therapies per order  - Administer medications as ordered  - Instruct and encourage patient and family to use good hand hygiene technique  - Identify and instruct in appropriate isolation precautions for identified infection/condition  Outcome: Adequate for Discharge     Problem: SAFETY ADULT  Goal: Patient will remain free of falls  Description: INTERVENTIONS:  - Educate patient/family on patient safety including physical limitations  - Instruct patient to call for assistance with activity   - Consult OT/PT to assist with strengthening/mobility   - Keep Call bell within reach  - Keep bed low and locked with side rails adjusted as appropriate  - Keep care items and personal belongings within reach  - Initiate and maintain comfort rounds  - Make Fall Risk Sign visible to staff  - Offer Toileting every 2 Hours, in advance of need  - Initiate/Maintain bed alarm  - Apply yellow socks and bracelet for high fall risk patients  - Consider moving patient to room near nurses station  Outcome: Adequate for Discharge  Goal: Maintain or return to baseline ADL function  Description: INTERVENTIONS:  -  Assess patient's ability to carry out  ADLs; assess patient's baseline for ADL function and identify physical deficits which impact ability to perform ADLs (bathing, care of mouth/teeth, toileting, grooming, dressing, etc.)  - Assess/evaluate cause of self-care deficits   - Assess range of motion  - Assess patient's mobility; develop plan if impaired  - Assess patient's need for assistive devices and provide as appropriate  - Encourage maximum independence but intervene and supervise when necessary  - Involve family in performance of ADLs  - Assess for home care needs following discharge   - Consider OT consult to assist with ADL evaluation and planning for discharge  - Provide patient education as appropriate  Outcome: Adequate for Discharge  Goal: Maintains/Returns to pre admission functional level  Description: INTERVENTIONS:  - Perform AM-PAC 6 Click Basic Mobility/ Daily Activity assessment daily.  - Set and communicate daily mobility goal to care team and patient/family/caregiver.   - Collaborate with rehabilitation services on mobility goals if consulted  - Perform Range of Motion 2 times a day.  - Reposition patient every 2 hours.  - Dangle patient 2 times a day  - Stand patient 2 times a day  - Ambulate patient 2 times a day  - Out of bed to chair 2 times a day   - Out of bed for meals 2  Problem: SAFETY ADULT  Goal: Patient will remain free of falls  Description: INTERVENTIONS:  - Educate patient/family on patient safety including physical limitations  - Instruct patient to call for assistance with activity   - Consult OT/PT to assist with strengthening/mobility   - Keep Call bell within reach  - Keep bed low and locked with side rails adjusted as appropriate  - Keep care items and personal belongings within reach  - Initiate and maintain comfort rounds  - Make Fall Risk Sign visible to staff  - Offer Toileting every 2 Hours, in advance of need  - Initiate/Maintain bed alarm  - Apply yellow socks and bracelet for high fall risk patients  -  Consider moving patient to room near nurses station  Outcome: Adequate for Discharge  Goal: Maintain or return to baseline ADL function  Description: INTERVENTIONS:  -  Assess patient's ability to carry out ADLs; assess patient's baseline for ADL function and identify physical deficits which impact ability to perform ADLs (bathing, care of mouth/teeth, toileting, grooming, dressing, etc.)  - Assess/evaluate cause of self-care deficits   - Assess range of motion  - Assess patient's mobility; develop plan if impaired  - Assess patient's need for assistive devices and provide as appropriate  - Encourage maximum independence but intervene and supervise when necessary  - Involve family in performance of ADLs  - Assess for home care needs following discharge   - Consider OT consult to assist with ADL evaluation and planning for discharge  - Provide patient education as appropriate  Outcome: Adequate for Discharge    Problem: Prexisting or High Potential for Compromised Skin Integrity  Goal: Skin integrity is maintained or improved  Description: INTERVENTIONS:  - Identify patients at risk for skin breakdown  - Assess and monitor skin integrity  - Assess and monitor nutrition and hydration status  - Monitor labs   - Assess for incontinence   - Turn and reposition patient  - Assist with mobility/ambulation  - Relieve pressure over bony prominences  - Avoid friction and shearing  - Provide appropriate hygiene as needed including keeping skin clean and dry  - Evaluate need for skin moisturizer/barrier cream  - Collaborate with interdisciplinary team   - Patient/family teaching  - Consider wound care consult   Outcome: Adequate for Discharge     Problem: METABOLIC, FLUID AND ELECTROLYTES - ADULT  Goal: Glucose maintained within target range  Description: INTERVENTIONS:  - Monitor Blood Glucose as ordered  - Assess for signs and symptoms of hyperglycemia and hypoglycemia  - Administer ordered medications to maintain glucose  within target range  - Assess nutritional intake and initiate nutrition service referral as needed  Outcome: Adequate for Discharge     Problem: SKIN/TISSUE INTEGRITY - ADULT  Goal: Skin Integrity remains intact(Skin Breakdown Prevention)  Description: Assess:  -Perform Asim assessment every shift  -Clean and moisturize skin every shift and as needed  -Inspect skin when repositioning, toileting, and assisting with ADLS  -Assess under medical devices such as nasal canula  every 2 hours  -Assess extremities for adequate circulation and sensation     Problem: Nutrition/Hydration-ADULT  Goal: Nutrient/Hydration intake appropriate for improving, restoring or maintaining nutritional needs  Description: Monitor and assess patient's nutrition/hydration status for malnutrition. Collaborate with interdisciplinary team and initiate plan and interventions as ordered.  Monitor patient's weight and dietary intake as ordered or per policy. Utilize nutrition screening tool and intervene as necessary. Determine patient's food preferences and provide high-protein, high-caloric foods as appropriate.     INTERVENTIONS:  - Monitor oral intake, urinary output, labs, and treatment plans  - Assess nutrition and hydration status and recommend course of action  - Evaluate amount of meals eaten  - Assist patient with eating if necessary   - Allow adequate time for meals  - Recommend/ encourage appropriate diets, oral nutritional supplements, and vitamin/mineral supplements  - Order, calculate, and assess calorie counts as needed  - Recommend, monitor, and adjust tube feedings and TPN/PPN based on assessed needs  - Assess need for intravenous fluids  - Provide specific nutrition/hydration education as appropriate  - Include patient/family/caregiver in decisions related to nutrition  Outcome: Adequate for Discharge

## 2024-06-14 NOTE — PLAN OF CARE
Problem: Knowledge Deficit  Goal: Patient/family/caregiver demonstrates understanding of disease process, treatment plan, medications, and discharge instructions  Description: Complete learning assessment and assess knowledge base.  Interventions:  - Provide teaching at level of understanding  - Provide teaching via preferred learning methods  Outcome: Progressing     Problem: Prexisting or High Potential for Compromised Skin Integrity  Goal: Skin integrity is maintained or improved  Description: INTERVENTIONS:  - Identify patients at risk for skin breakdown  - Assess and monitor skin integrity  - Assess and monitor nutrition and hydration status  - Monitor labs   - Assess for incontinence   - Turn and reposition patient  - Assist with mobility/ambulation  - Relieve pressure over bony prominences  - Avoid friction and shearing  - Provide appropriate hygiene as needed including keeping skin clean and dry  - Evaluate need for skin moisturizer/barrier cream  - Collaborate with interdisciplinary team   - Patient/family teaching  - Consider wound care consult   Outcome: Progressing     Problem: SAFETY ADULT  Goal: Patient will remain free of falls  Description: INTERVENTIONS:  - Educate patient/family on patient safety including physical limitations  - Instruct patient to call for assistance with activity   - Consult OT/PT to assist with strengthening/mobility   - Keep Call bell within reach  - Keep bed low and locked with side rails adjusted as appropriate  - Keep care items and personal belongings within reach  - Initiate and maintain comfort rounds  - Make Fall Risk Sign visible to staff  - Offer Toileting every 2 Hours, in advance of need  - Initiate/Maintain bed alarm  - Obtain necessary fall risk management equipment  - Apply yellow socks and bracelet for high fall risk patients  - Consider moving patient to room near nurses station  Outcome: Progressing

## 2024-06-15 LAB
BACTERIA BLD CULT: NORMAL
BACTERIA BLD CULT: NORMAL

## 2024-07-11 PROBLEM — R65.20 SEVERE SEPSIS (HCC): Status: RESOLVED | Noted: 2024-06-09 | Resolved: 2024-07-11

## 2024-07-11 PROBLEM — A41.9 SEVERE SEPSIS (HCC): Status: RESOLVED | Noted: 2024-06-09 | Resolved: 2024-07-11

## 2024-07-14 PROBLEM — N39.0 UTI (URINARY TRACT INFECTION): Status: RESOLVED | Noted: 2024-06-09 | Resolved: 2024-07-14

## 2024-07-14 PROBLEM — N12 PYELONEPHRITIS: Status: RESOLVED | Noted: 2024-06-14 | Resolved: 2024-07-14

## 2025-02-26 ENCOUNTER — APPOINTMENT (EMERGENCY)
Dept: RADIOLOGY | Facility: HOSPITAL | Age: OVER 89
DRG: 481 | End: 2025-02-26
Payer: MEDICARE

## 2025-02-26 ENCOUNTER — HOSPITAL ENCOUNTER (INPATIENT)
Facility: HOSPITAL | Age: OVER 89
LOS: 5 days | Discharge: NURSING, REHAB OR LONG-TERM CARE | DRG: 481 | End: 2025-03-03
Attending: EMERGENCY MEDICINE | Admitting: FAMILY MEDICINE
Payer: MEDICARE

## 2025-02-26 DIAGNOSIS — S21.001S WOUND OF RIGHT BREAST, SEQUELA: ICD-10-CM

## 2025-02-26 DIAGNOSIS — Z01.818 PRE-OP EVALUATION: ICD-10-CM
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
## 2025-02-26 DIAGNOSIS — S72.009A HIP FRACTURE (HCC): ICD-10-CM

## 2025-02-26 LAB
ALBUMIN SERPL BCG-MCNC: 3.9 G/DL (ref 3.5–5)
ALP SERPL-CCNC: 87 U/L (ref 34–104)
ALT SERPL W P-5'-P-CCNC: 8 U/L (ref 7–52)
ANION GAP SERPL CALCULATED.3IONS-SCNC: 10 MMOL/L (ref 4–13)
AST SERPL W P-5'-P-CCNC: 13 U/L (ref 13–39)
BASOPHILS # BLD AUTO: 0.05 THOUSANDS/ÂΜL (ref 0–0.1)
BASOPHILS NFR BLD AUTO: 1 % (ref 0–1)
BILIRUB SERPL-MCNC: 0.28 MG/DL (ref 0.2–1)
BUN SERPL-MCNC: 15 MG/DL (ref 5–25)
CALCIUM SERPL-MCNC: 9.6 MG/DL (ref 8.4–10.2)
CHLORIDE SERPL-SCNC: 102 MMOL/L (ref 96–108)
CO2 SERPL-SCNC: 22 MMOL/L (ref 21–32)
CREAT SERPL-MCNC: 1 MG/DL (ref 0.6–1.3)
EOSINOPHIL # BLD AUTO: 0.16 THOUSAND/ÂΜL (ref 0–0.61)
EOSINOPHIL NFR BLD AUTO: 2 % (ref 0–6)
ERYTHROCYTE [DISTWIDTH] IN BLOOD BY AUTOMATED COUNT: 13.4 % (ref 11.6–15.1)
GFR SERPL CREATININE-BSD FRML MDRD: 48 ML/MIN/1.73SQ M
GLUCOSE SERPL-MCNC: 128 MG/DL (ref 65–140)
HCT VFR BLD AUTO: 36.9 % (ref 34.8–46.1)
HGB BLD-MCNC: 11.5 G/DL (ref 11.5–15.4)
IMM GRANULOCYTES # BLD AUTO: 0.05 THOUSAND/UL (ref 0–0.2)
IMM GRANULOCYTES NFR BLD AUTO: 1 % (ref 0–2)
LYMPHOCYTES # BLD AUTO: 1.82 THOUSANDS/ÂΜL (ref 0.6–4.47)
LYMPHOCYTES NFR BLD AUTO: 25 % (ref 14–44)
MCH RBC QN AUTO: 29 PG (ref 26.8–34.3)
MCHC RBC AUTO-ENTMCNC: 31.2 G/DL (ref 31.4–37.4)
MCV RBC AUTO: 93 FL (ref 82–98)
MONOCYTES # BLD AUTO: 0.54 THOUSAND/ÂΜL (ref 0.17–1.22)
MONOCYTES NFR BLD AUTO: 7 % (ref 4–12)
NEUTROPHILS # BLD AUTO: 4.64 THOUSANDS/ÂΜL (ref 1.85–7.62)
NEUTS SEG NFR BLD AUTO: 64 % (ref 43–75)
NRBC BLD AUTO-RTO: 0 /100 WBCS
PLATELET # BLD AUTO: 297 THOUSANDS/UL (ref 149–390)
PMV BLD AUTO: 10 FL (ref 8.9–12.7)
POTASSIUM SERPL-SCNC: 4.2 MMOL/L (ref 3.5–5.3)
PROT SERPL-MCNC: 6.8 G/DL (ref 6.4–8.4)
RBC # BLD AUTO: 3.97 MILLION/UL (ref 3.81–5.12)
SODIUM SERPL-SCNC: 134 MMOL/L (ref 135–147)
WBC # BLD AUTO: 7.26 THOUSAND/UL (ref 4.31–10.16)

## 2025-02-26 PROCEDURE — 85025 COMPLETE CBC W/AUTO DIFF WBC: CPT | Performed by: EMERGENCY MEDICINE

## 2025-02-26 PROCEDURE — 73521 X-RAY EXAM HIPS BI 2 VIEWS: CPT

## 2025-02-26 PROCEDURE — 99285 EMERGENCY DEPT VISIT HI MDM: CPT | Performed by: EMERGENCY MEDICINE

## 2025-02-26 PROCEDURE — 87081 CULTURE SCREEN ONLY: CPT | Performed by: FAMILY MEDICINE

## 2025-02-26 PROCEDURE — 70450 CT HEAD/BRAIN W/O DYE: CPT

## 2025-02-26 PROCEDURE — 96365 THER/PROPH/DIAG IV INF INIT: CPT

## 2025-02-26 PROCEDURE — 36415 COLL VENOUS BLD VENIPUNCTURE: CPT | Performed by: EMERGENCY MEDICINE

## 2025-02-26 PROCEDURE — 99285 EMERGENCY DEPT VISIT HI MDM: CPT

## 2025-02-26 PROCEDURE — 72125 CT NECK SPINE W/O DYE: CPT

## 2025-02-26 PROCEDURE — 83036 HEMOGLOBIN GLYCOSYLATED A1C: CPT | Performed by: FAMILY MEDICINE

## 2025-02-26 PROCEDURE — 99223 1ST HOSP IP/OBS HIGH 75: CPT | Performed by: FAMILY MEDICINE

## 2025-02-26 PROCEDURE — 80053 COMPREHEN METABOLIC PANEL: CPT | Performed by: EMERGENCY MEDICINE

## 2025-02-26 RX ORDER — SACCHAROMYCES BOULARDII 250 MG
250 CAPSULE ORAL 2 TIMES DAILY
Status: DISCONTINUED | OUTPATIENT
Start: 2025-02-27 | End: 2025-03-03 | Stop reason: HOSPADM

## 2025-02-26 RX ORDER — MEMANTINE HYDROCHLORIDE 5 MG/1
5 TABLET ORAL 2 TIMES DAILY
Status: DISCONTINUED | OUTPATIENT
Start: 2025-02-27 | End: 2025-03-03 | Stop reason: HOSPADM

## 2025-02-26 RX ORDER — ACETAMINOPHEN 10 MG/ML
1000 INJECTION, SOLUTION INTRAVENOUS ONCE
Status: COMPLETED | OUTPATIENT
Start: 2025-02-26 | End: 2025-02-26

## 2025-02-26 RX ORDER — INSULIN LISPRO 100 [IU]/ML
1-5 INJECTION, SOLUTION INTRAVENOUS; SUBCUTANEOUS
Status: DISCONTINUED | OUTPATIENT
Start: 2025-02-27 | End: 2025-03-03 | Stop reason: HOSPADM

## 2025-02-26 RX ORDER — TRANEXAMIC ACID 10 MG/ML
1000 INJECTION, SOLUTION INTRAVENOUS ONCE
Status: COMPLETED | OUTPATIENT
Start: 2025-02-26 | End: 2025-02-26

## 2025-02-26 RX ORDER — INSULIN GLARGINE 100 [IU]/ML
15 INJECTION, SOLUTION SUBCUTANEOUS EVERY MORNING
Status: DISCONTINUED | OUTPATIENT
Start: 2025-02-27 | End: 2025-03-01

## 2025-02-26 RX ORDER — LOSARTAN POTASSIUM 50 MG/1
50 TABLET ORAL DAILY
Status: DISCONTINUED | OUTPATIENT
Start: 2025-02-27 | End: 2025-03-03 | Stop reason: HOSPADM

## 2025-02-26 RX ORDER — LANOLIN ALCOHOL/MO/W.PET/CERES
400 CREAM (GRAM) TOPICAL DAILY
Status: DISCONTINUED | OUTPATIENT
Start: 2025-02-27 | End: 2025-03-03 | Stop reason: HOSPADM

## 2025-02-26 RX ORDER — MIRTAZAPINE 15 MG/1
15 TABLET, FILM COATED ORAL
Status: DISCONTINUED | OUTPATIENT
Start: 2025-02-26 | End: 2025-03-03 | Stop reason: HOSPADM

## 2025-02-26 RX ORDER — PANTOPRAZOLE SODIUM 40 MG/1
40 TABLET, DELAYED RELEASE ORAL DAILY
Status: DISCONTINUED | OUTPATIENT
Start: 2025-02-27 | End: 2025-03-03 | Stop reason: HOSPADM

## 2025-02-26 RX ORDER — ACETAMINOPHEN 325 MG/1
975 TABLET ORAL EVERY 8 HOURS SCHEDULED
Status: DISCONTINUED | OUTPATIENT
Start: 2025-02-26 | End: 2025-03-03 | Stop reason: HOSPADM

## 2025-02-26 RX ORDER — PAROXETINE 20 MG/1
40 TABLET, FILM COATED ORAL EVERY MORNING
Status: DISCONTINUED | OUTPATIENT
Start: 2025-02-27 | End: 2025-03-03 | Stop reason: HOSPADM

## 2025-02-26 RX ORDER — DONEPEZIL HYDROCHLORIDE 5 MG/1
5 TABLET, FILM COATED ORAL
Status: DISCONTINUED | OUTPATIENT
Start: 2025-02-27 | End: 2025-03-03 | Stop reason: HOSPADM

## 2025-02-26 RX ORDER — HYDROMORPHONE HCL/PF 1 MG/ML
0.5 SYRINGE (ML) INJECTION ONCE
Refills: 0 | Status: COMPLETED | OUTPATIENT
Start: 2025-02-26 | End: 2025-02-26

## 2025-02-26 RX ORDER — ONDANSETRON 2 MG/ML
4 INJECTION INTRAMUSCULAR; INTRAVENOUS ONCE
Status: COMPLETED | OUTPATIENT
Start: 2025-02-26 | End: 2025-02-26

## 2025-02-26 RX ADMIN — ONDANSETRON 4 MG: 2 INJECTION INTRAMUSCULAR; INTRAVENOUS at 19:40

## 2025-02-26 RX ADMIN — HYDROMORPHONE HYDROCHLORIDE 0.5 MG: 1 INJECTION, SOLUTION INTRAMUSCULAR; INTRAVENOUS; SUBCUTANEOUS at 19:40

## 2025-02-26 RX ADMIN — TRANEXAMIC ACID 1000 MG: 10 INJECTION, SOLUTION INTRAVENOUS at 19:40

## 2025-02-26 RX ADMIN — MIRTAZAPINE 15 MG: 15 TABLET, FILM COATED ORAL at 23:54

## 2025-02-26 RX ADMIN — ACETAMINOPHEN 975 MG: 325 TABLET ORAL at 23:28

## 2025-02-26 RX ADMIN — MELATONIN TAB 3 MG 4.5 MG: 3 TAB at 23:54

## 2025-02-26 RX ADMIN — ACETAMINOPHEN 1000 MG: 10 INJECTION INTRAVENOUS at 19:08

## 2025-02-26 NOTE — ED PROVIDER NOTES
Time reflects when diagnosis was documented in both MDM as applicable and the Disposition within this note       Time User Action Codes Description Comment    2/26/2025  7:32 PM Vandana Basilio Add [W19.XXXA] Fall, initial encounter     2/26/2025  7:32 PM LuzhowardAyeVandana Add [S72.009A] Hip fracture (HCC)           ED Disposition       ED Disposition   Admit    Condition   Stable    Date/Time   Wed Feb 26, 2025  7:32 PM    Comment    .               Assessment & Plan       Medical Decision Making  Patient evaluated with labs imaging discussed the case with the on-call orthopedist Dr. Abbott given TXA n.p.o. after midnight orthopedics will consult in the morning admitted to hospitalist service for further evaluation management pain control to the ED    Problems Addressed:  Fall, initial encounter: acute illness or injury  Hip fracture (HCC): acute illness or injury    Amount and/or Complexity of Data Reviewed  External Data Reviewed: notes.  Labs: ordered. Decision-making details documented in ED Course.  Radiology: ordered and independent interpretation performed. Decision-making details documented in ED Course.     Details: Left femoral neck fracture    Risk  Prescription drug management.  Decision regarding hospitalization.             Medications   acetaminophen (Ofirmev) injection 1,000 mg (0 mg Intravenous Stopped 2/26/25 1941)   HYDROmorphone (DILAUDID) injection 0.5 mg (0.5 mg Intravenous Given 2/26/25 1940)   ondansetron (ZOFRAN) injection 4 mg (4 mg Intravenous Given 2/26/25 1940)   tranexamic acid (CYKLOKAPRON) 1000-0.7 MG/100ML-% injection 1,000 mg (1,000 mg Intravenous New Bag 2/26/25 1940)       ED Risk Strat Scores                            SBIRT 22yo+      Flowsheet Row Most Recent Value   Initial Alcohol Screen: US AUDIT-C     1. How often do you have a drink containing alcohol? 0 Filed at: 02/26/2025 1802   Audit-C Score 0 Filed at: 02/26/2025 1802                            History of Present Illness        Chief Complaint   Patient presents with    Fall     Unwitnessed fall, with head strike. LLE shortened and externally rotated.       Past Medical History:   Diagnosis Date    Anxiety     CKD (chronic kidney disease) stage 3, GFR 30-59 ml/min ([facility])     Dementia ([facility])     Depression     Diabetes mellitus ([facility])     Gait difficulty     GERD (gastroesophageal reflux disease)     Hyperlipidemia     Hypertension     Incontinence of urine     Macula lutea degeneration     Neurocognitive disorder     Osteoarthritis       Past Surgical History:   Procedure Laterality Date    APPENDECTOMY      HYSTERECTOMY      ORIF SHOULDER DISLOCATION W/ HUMERAL FRACTURE        Family History   Problem Relation Age of Onset    Heart disease Mother     No Known Problems Father     No Known Problems Sister     No Known Problems Brother     No Known Problems Maternal Aunt     No Known Problems Maternal Uncle     No Known Problems Paternal Aunt     No Known Problems Paternal Uncle     No Known Problems Maternal Grandmother     No Known Problems Maternal Grandfather     No Known Problems Paternal Grandmother     No Known Problems Paternal Grandfather     ADD / ADHD Neg Hx     Anesthesia problems Neg Hx     Cancer Neg Hx     Clotting disorder Neg Hx     Collagen disease Neg Hx     Diabetes Neg Hx     Dislocations Neg Hx     Learning disabilities Neg Hx     Neurological problems Neg Hx     Osteoporosis Neg Hx     Rheumatologic disease Neg Hx     Scoliosis Neg Hx     Vascular Disease Neg Hx       Social History     Tobacco Use    Smoking status: Never    Smokeless tobacco: Never   Vaping Use    Vaping status: Never Used   Substance Use Topics    Alcohol use: Not Currently    Drug use: Not Currently      E-Cigarette/Vaping    E-Cigarette Use Never User       E-Cigarette/Vaping Substances    Nicotine No     THC No     CBD No     Flavoring No     Other No     Unknown No       I have reviewed and agree with the history as documented.      89-year-old female presents after a fall unwitnessed fall at her care center she slipped and fell left-sided hip pain has bilateral knee pain.  Hit her head did not lose consciousness no nausea vomiting back pain neck pain no other injuries or complaints not on blood thinners.      History provided by:  Patient and EMS personnel   used: No        Review of Systems   Constitutional: Negative.    HENT: Negative.     Eyes: Negative.    Respiratory: Negative.     Cardiovascular: Negative.    Gastrointestinal: Negative.    Endocrine: Negative.    Genitourinary: Negative.    Musculoskeletal:  Positive for arthralgias and myalgias.   Skin: Negative.    Allergic/Immunologic: Negative.    Neurological: Negative.    Hematological: Negative.    Psychiatric/Behavioral: Negative.     All other systems reviewed and are negative.          Objective       ED Triage Vitals [02/26/25 1800]   Temperature Pulse Blood Pressure Respirations SpO2 Patient Position - Orthostatic VS   99 °F (37.2 °C) 68 (!) 177/86 20 96 % Lying      Temp Source Heart Rate Source BP Location FiO2 (%) Pain Score    Oral Monitor Right arm -- --      Vitals      Date and Time Temp Pulse SpO2 Resp BP Pain Score FACES Pain Rating User   02/26/25 1800 99 °F (37.2 °C) 68 96 % 20 177/86 -- -- OE            Physical Exam  Vitals and nursing note reviewed.   Constitutional:       Appearance: Normal appearance.   HENT:      Head: Normocephalic and atraumatic.      Nose: Nose normal.      Mouth/Throat:      Mouth: Mucous membranes are moist.   Eyes:      Extraocular Movements: Extraocular movements intact.      Pupils: Pupils are equal, round, and reactive to light.   Cardiovascular:      Rate and Rhythm: Normal rate and regular rhythm.   Pulmonary:      Effort: Pulmonary effort is normal.      Breath sounds: Normal breath sounds.   Abdominal:      General: Abdomen is flat. Bowel sounds are normal.      Palpations: Abdomen is soft.    Musculoskeletal:         General: Normal range of motion.      Cervical back: Normal range of motion and neck supple.      Comments: Left lower extremity: Externally rotated shortened positive femoral pulses no open wounds noted.  Pelvis is stable.   Skin:     General: Skin is warm.      Capillary Refill: Capillary refill takes less than 2 seconds.   Neurological:      General: No focal deficit present.      Mental Status: She is alert and oriented to person, place, and time. Mental status is at baseline.   Psychiatric:         Mood and Affect: Mood normal.         Thought Content: Thought content normal.         Results Reviewed       Procedure Component Value Units Date/Time    Comprehensive metabolic panel [[id number]]  (Abnormal) Collected: 02/26/25 1821    Lab Status: Final result Specimen: Blood from Arm, Left Updated: 02/26/25 1851     Sodium 134 mmol/L      Potassium 4.2 mmol/L      Chloride 102 mmol/L      CO2 22 mmol/L      ANION GAP 10 mmol/L      BUN 15 mg/dL      Creatinine 1.00 mg/dL      Glucose 128 mg/dL      Calcium 9.6 mg/dL      AST 13 U/L      ALT 8 U/L      Alkaline Phosphatase 87 U/L      Total Protein 6.8 g/dL      Albumin 3.9 g/dL      Total Bilirubin 0.28 mg/dL      eGFR 48 ml/min/1.73sq m     Narrative:      National Kidney Disease Foundation guidelines for Chronic Kidney Disease (CKD):     Stage 1 with normal or high GFR (GFR > 90 mL/min/1.73 square meters)    Stage 2 Mild CKD (GFR = 60-89 mL/min/1.73 square meters)    Stage 3A Moderate CKD (GFR = 45-59 mL/min/1.73 square meters)    Stage 3B Moderate CKD (GFR = 30-44 mL/min/1.73 square meters)    Stage 4 Severe CKD (GFR = 15-29 mL/min/1.73 square meters)    Stage 5 End Stage CKD (GFR <15 mL/min/1.73 square meters)  Note: GFR calculation is accurate only with a steady state creatinine    CBC and differential [[id number]]  (Abnormal) Collected: 02/26/25 1821    Lab Status: Final result Specimen: Blood from Arm, Left Updated: 02/26/25 1827      WBC 7.26 Thousand/uL      RBC 3.97 Million/uL      Hemoglobin 11.5 g/dL      Hematocrit 36.9 %      MCV 93 fL      MCH 29.0 pg      MCHC 31.2 g/dL      RDW 13.4 %      MPV 10.0 fL      Platelets 297 Thousands/uL      nRBC 0 /100 WBCs      Segmented % 64 %      Immature Grans % 1 %      Lymphocytes % 25 %      Monocytes % 7 %      Eosinophils Relative 2 %      Basophils Relative 1 %      Absolute Neutrophils 4.64 Thousands/µL      Absolute Immature Grans 0.05 Thousand/uL      Absolute Lymphocytes 1.82 Thousands/µL      Absolute Monocytes 0.54 Thousand/µL      Eosinophils Absolute 0.16 Thousand/µL      Basophils Absolute 0.05 Thousands/µL             CT head without contrast   Final Interpretation by Gary Anderson MD (02/26 1850)      No acute intracranial abnormality.                  Workstation performed: [id number]         CT spine cervical without contrast   Final Interpretation by Gary Anderson MD (02/26 1853)      No evidence of acute cervical spine fracture or traumatic malalignment.                  Workstation performed: [id number]         XR hips bilateral 2 vw w pelvis if performed    (Results Pending)       Procedures    ED Medication and Procedure Management   Prior to Admission Medications   Prescriptions Last Dose Informant Patient Reported? Taking?   Diclofenac Sodium (VOLTAREN) 1 %   Yes No   Sig: Apply 2 g topically 3 (three) times a day as needed (TO RIGHT KNEE)   Fesoterodine Fumarate ER 8 MG TB24   Yes No   Sig: Take 8 mg by mouth in the morning   Insulin Glargine Solostar (Basaglar KwikPen) 100 UNIT/ML SOPN   Yes No   Sig: Inject 15 Units under the skin in the morning   MAGNESIUM OXIDE 400 PO   Yes No   Sig: Take 400 mg by mouth in the morning   Melatonin 3-10 MG TABS  Care Giver Yes No   Sig: Take 5 mg by mouth daily at bedtime   PARoxetine (PAXIL) 30 mg tablet  Care Giver Yes No   Sig: Take 40 mg by mouth every morning     acetaminophen (TYLENOL) 325 mg tablet   Yes No   Sig: Take  650 mg by mouth 2 (two) times a day   acetaminophen (TYLENOL) 500 mg tablet  Self Yes No   Sig: Take 1,000 mg by mouth every 6 (six) hours as needed for mild pain    cholecalciferol (VITAMIN D3) 400 units tablet   Yes No   Sig: Take 1,000 Units by mouth daily   cycloSPORINE (RESTASIS) 0.05 % ophthalmic emulsion   Yes No   Sig: Administer 1 drop to both eyes 2 (two) times a day.   donepezil (ARICEPT) 5 mg tablet   Yes No   Sig: Take 5 mg by mouth daily at bedtime   loperamide (IMODIUM) 2 mg capsule   Yes No   Sig: Take 2 mg by mouth in the morning   losartan (COZAAR) 50 mg tablet   Yes No   Sig: Take 50 mg by mouth daily.   memantine (NAMENDA) 5 mg tablet   Yes No   Sig: Take 5 mg by mouth 2 (two) times a day   metFORMIN (GLUCOPHAGE) 500 mg tablet  Care Giver Yes No   Sig: Take 500 mg by mouth daily with breakfast   mirtazapine (Remeron) 15 mg tablet   Yes No   Sig: Take 15 mg by mouth daily at bedtime   pantoprazole (PROTONIX) 40 mg tablet   Yes No   Sig: Take 40 mg by mouth daily.   saccharomyces boulardii (FLORASTOR) 250 mg capsule   Yes No   Sig: Take 250 mg by mouth 2 (two) times a day      Facility-Administered Medications: None     Current Discharge Medication List        CONTINUE these medications which have NOT CHANGED    Details   !! acetaminophen (TYLENOL) 325 mg tablet Take 650 mg by mouth 2 (two) times a day      !! acetaminophen (TYLENOL) 500 mg tablet Take 1,000 mg by mouth every 6 (six) hours as needed for mild pain       cholecalciferol (VITAMIN D3) 400 units tablet Take 1,000 Units by mouth daily      cycloSPORINE (RESTASIS) 0.05 % ophthalmic emulsion Administer 1 drop to both eyes 2 (two) times a day.      Diclofenac Sodium (VOLTAREN) 1 % Apply 2 g topically 3 (three) times a day as needed (TO RIGHT KNEE)      donepezil (ARICEPT) 5 mg tablet Take 5 mg by mouth daily at bedtime      Fesoterodine Fumarate ER 8 MG TB24 Take 8 mg by mouth in the morning      Insulin Glargine Solostar (Basaglar KwikPen)  100 UNIT/ML SOPN Inject 15 Units under the skin in the morning      loperamide (IMODIUM) 2 mg capsule Take 2 mg by mouth in the morning      losartan (COZAAR) 50 mg tablet Take 50 mg by mouth daily.      MAGNESIUM OXIDE 400 PO Take 400 mg by mouth in the morning      Melatonin 3-10 MG TABS Take 5 mg by mouth daily at bedtime      memantine (NAMENDA) 5 mg tablet Take 5 mg by mouth 2 (two) times a day      metFORMIN (GLUCOPHAGE) 500 mg tablet Take 500 mg by mouth daily with breakfast      mirtazapine (Remeron) 15 mg tablet Take 15 mg by mouth daily at bedtime      pantoprazole (PROTONIX) 40 mg tablet Take 40 mg by mouth daily.      PARoxetine (PAXIL) 30 mg tablet Take 40 mg by mouth every morning        saccharomyces boulardii (FLORASTOR) 250 mg capsule Take 250 mg by mouth 2 (two) times a day       !! - Potential duplicate medications found. Please discuss with provider.        No discharge procedures on file.  ED SEPSIS DOCUMENTATION   Time reflects when diagnosis was documented in both MDM as applicable and the Disposition within this note       Time User Action Codes Description Comment    2/26/2025  7:32 PM Vandana Basilio [W19.XXXA] Fall, initial encounter     2/26/2025  7:32 PM Vandana Basilio [S72.009A] Hip fracture (HCC)                  Vandana Basilio DO  02/26/25 2193

## 2025-02-27 ENCOUNTER — ANESTHESIA (INPATIENT)
Dept: PERIOP | Facility: HOSPITAL | Age: OVER 89
DRG: 481 | End: 2025-02-27
Payer: MEDICARE

## 2025-02-27 ENCOUNTER — ANESTHESIA EVENT (INPATIENT)
Dept: PERIOP | Facility: HOSPITAL | Age: OVER 89
DRG: 481 | End: 2025-02-27
Payer: MEDICARE

## 2025-02-27 ENCOUNTER — APPOINTMENT (INPATIENT)
Dept: RADIOLOGY | Facility: HOSPITAL | Age: OVER 89
DRG: 481 | End: 2025-02-27
Payer: MEDICARE

## 2025-02-27 ENCOUNTER — APPOINTMENT (INPATIENT)
Dept: NON INVASIVE DIAGNOSTICS | Facility: HOSPITAL | Age: OVER 89
DRG: 481 | End: 2025-02-27
Payer: MEDICARE

## 2025-02-27 PROBLEM — S72.002A CLOSED FRACTURE OF LEFT HIP (HCC): Status: ACTIVE | Noted: 2025-02-26

## 2025-02-27 PROBLEM — Z90.710 HISTORY OF HYSTERECTOMY: Status: ACTIVE | Noted: 2025-02-27

## 2025-02-27 LAB
ANION GAP SERPL CALCULATED.3IONS-SCNC: 10 MMOL/L (ref 4–13)
AORTIC ROOT: 3 CM
AV LVOT PEAK GRADIENT: 7 MMHG
AV PEAK GRADIENT: 11 MMHG
BSA FOR ECHO PROCEDURE: 1.72 M2
BUN SERPL-MCNC: 17 MG/DL (ref 5–25)
CALCIUM SERPL-MCNC: 9 MG/DL (ref 8.4–10.2)
CHLORIDE SERPL-SCNC: 102 MMOL/L (ref 96–108)
CO2 SERPL-SCNC: 21 MMOL/L (ref 21–32)
CREAT SERPL-MCNC: 1.09 MG/DL (ref 0.6–1.3)
DOP CALC LVOT AREA: 2.27 CM2
DOP CALC LVOT DIAMETER: 1.7 CM
E WAVE DECELERATION TIME: 279 MS
E/A RATIO: 0.52
ERYTHROCYTE [DISTWIDTH] IN BLOOD BY AUTOMATED COUNT: 13.6 % (ref 11.6–15.1)
EST. AVERAGE GLUCOSE BLD GHB EST-MCNC: 174 MG/DL
FRACTIONAL SHORTENING: 34 (ref 28–44)
GFR SERPL CREATININE-BSD FRML MDRD: 43 ML/MIN/1.73SQ M
GLUCOSE SERPL-MCNC: 145 MG/DL (ref 65–140)
GLUCOSE SERPL-MCNC: 147 MG/DL (ref 65–140)
GLUCOSE SERPL-MCNC: 151 MG/DL (ref 65–140)
GLUCOSE SERPL-MCNC: 225 MG/DL (ref 65–140)
HBA1C MFR BLD: 7.7 %
HCT VFR BLD AUTO: 32.5 % (ref 34.8–46.1)
HGB BLD-MCNC: 10.3 G/DL (ref 11.5–15.4)
INR PPP: 1.04 (ref 0.85–1.19)
INTERVENTRICULAR SEPTUM IN DIASTOLE (PARASTERNAL SHORT AXIS VIEW): 1.1 CM
INTERVENTRICULAR SEPTUM: 1.1 CM (ref 0.6–1.1)
LAAS-AP2: 15.6 CM2
LAAS-AP4: 19.2 CM2
LEFT ATRIUM SIZE: 2.4 CM
LEFT ATRIUM VOLUME (MOD BIPLANE): 41 ML
LEFT ATRIUM VOLUME INDEX (MOD BIPLANE): 23.8 ML/M2
LEFT INTERNAL DIMENSION IN SYSTOLE: 2.1 CM (ref 2.1–4)
LEFT VENTRICULAR INTERNAL DIMENSION IN DIASTOLE: 3.2 CM (ref 3.5–6)
LEFT VENTRICULAR POSTERIOR WALL IN END DIASTOLE: 0.8 CM
LEFT VENTRICULAR STROKE VOLUME: 29 ML
LV EF US.2D.A4C+ESTIMATED: 67 %
LVSV (TEICH): 29 ML
MAGNESIUM SERPL-MCNC: 1.7 MG/DL (ref 1.9–2.7)
MCH RBC QN AUTO: 29.6 PG (ref 26.8–34.3)
MCHC RBC AUTO-ENTMCNC: 31.7 G/DL (ref 31.4–37.4)
MCV RBC AUTO: 93 FL (ref 82–98)
MV E'TISSUE VEL-SEP: 5 CM/S
MV PEAK A VEL: 1.13 M/S
MV PEAK E VEL: 59 CM/S
MV STENOSIS PRESSURE HALF TIME: 81 MS
MV VALVE AREA P 1/2 METHOD: 2.72
PLATELET # BLD AUTO: 264 THOUSANDS/UL (ref 149–390)
PMV BLD AUTO: 9.9 FL (ref 8.9–12.7)
POTASSIUM SERPL-SCNC: 4.4 MMOL/L (ref 3.5–5.3)
PROTHROMBIN TIME: 14.1 SECONDS (ref 12.3–15)
RBC # BLD AUTO: 3.48 MILLION/UL (ref 3.81–5.12)
RIGHT ATRIUM AREA SYSTOLE A4C: 12 CM2
RIGHT VENTRICLE ID DIMENSION: 3.4 CM
SL CV LEFT ATRIUM LENGTH A2C: 6 CM
SL CV LV EF: 65
SL CV PED ECHO LEFT VENTRICLE DIASTOLIC VOLUME (MOD BIPLANE) 2D: 42 ML
SL CV PED ECHO LEFT VENTRICLE SYSTOLIC VOLUME (MOD BIPLANE) 2D: 14 ML
SODIUM SERPL-SCNC: 133 MMOL/L (ref 135–147)
TR MAX PG: 20 MMHG
TR PEAK VELOCITY: 2.3 M/S
TRICUSPID ANNULAR PLANE SYSTOLIC EXCURSION: 1.2 CM
TRICUSPID VALVE PEAK REGURGITATION VELOCITY: 2.25 M/S
WBC # BLD AUTO: 7.83 THOUSAND/UL (ref 4.31–10.16)

## 2025-02-27 PROCEDURE — 99232 SBSQ HOSP IP/OBS MODERATE 35: CPT | Performed by: FAMILY MEDICINE

## 2025-02-27 PROCEDURE — 82948 REAGENT STRIP/BLOOD GLUCOSE: CPT

## 2025-02-27 PROCEDURE — 99223 1ST HOSP IP/OBS HIGH 75: CPT | Performed by: ORTHOPAEDIC SURGERY

## 2025-02-27 PROCEDURE — C1769 GUIDE WIRE: HCPCS | Performed by: ORTHOPAEDIC SURGERY

## 2025-02-27 PROCEDURE — 93306 TTE W/DOPPLER COMPLETE: CPT | Performed by: INTERNAL MEDICINE

## 2025-02-27 PROCEDURE — 85610 PROTHROMBIN TIME: CPT | Performed by: FAMILY MEDICINE

## 2025-02-27 PROCEDURE — C1713 ANCHOR/SCREW BN/BN,TIS/BN: HCPCS | Performed by: ORTHOPAEDIC SURGERY

## 2025-02-27 PROCEDURE — 80048 BASIC METABOLIC PNL TOTAL CA: CPT | Performed by: FAMILY MEDICINE

## 2025-02-27 PROCEDURE — 85027 COMPLETE CBC AUTOMATED: CPT | Performed by: FAMILY MEDICINE

## 2025-02-27 PROCEDURE — 93005 ELECTROCARDIOGRAM TRACING: CPT

## 2025-02-27 PROCEDURE — 27245 TREAT THIGH FRACTURE: CPT | Performed by: ORTHOPAEDIC SURGERY

## 2025-02-27 PROCEDURE — 93306 TTE W/DOPPLER COMPLETE: CPT

## 2025-02-27 PROCEDURE — 0QS736Z REPOSITION LEFT UPPER FEMUR WITH INTRAMEDULLARY INTERNAL FIXATION DEVICE, PERCUTANEOUS APPROACH: ICD-10-PCS | Performed by: ORTHOPAEDIC SURGERY

## 2025-02-27 PROCEDURE — 99222 1ST HOSP IP/OBS MODERATE 55: CPT | Performed by: INTERNAL MEDICINE

## 2025-02-27 PROCEDURE — 83735 ASSAY OF MAGNESIUM: CPT | Performed by: FAMILY MEDICINE

## 2025-02-27 PROCEDURE — 73552 X-RAY EXAM OF FEMUR 2/>: CPT

## 2025-02-27 PROCEDURE — 27245 TREAT THIGH FRACTURE: CPT | Performed by: PHYSICIAN ASSISTANT

## 2025-02-27 DEVICE — 12MM/125 DEG TI CANN TFNA 170MM - STERILE
Type: IMPLANTABLE DEVICE | Site: HIP | Status: FUNCTIONAL
Brand: TFN-ADVANCE

## 2025-02-27 DEVICE — TFNA FENESTRATED SCREW 95MM - STERILE
Type: IMPLANTABLE DEVICE | Site: HIP | Status: FUNCTIONAL
Brand: TFN-ADVANCE

## 2025-02-27 DEVICE — LOCKING SCREW FOR IM NAIL Ø 5MM/ 34MM/ XL25/ STERILE: Type: IMPLANTABLE DEVICE | Site: HIP | Status: FUNCTIONAL

## 2025-02-27 RX ORDER — OLANZAPINE 10 MG/2ML
2.5 INJECTION, POWDER, FOR SOLUTION INTRAMUSCULAR ONCE
Status: COMPLETED | OUTPATIENT
Start: 2025-02-27 | End: 2025-02-27

## 2025-02-27 RX ORDER — LIDOCAINE HYDROCHLORIDE 10 MG/ML
INJECTION, SOLUTION EPIDURAL; INFILTRATION; INTRACAUDAL; PERINEURAL AS NEEDED
Status: DISCONTINUED | OUTPATIENT
Start: 2025-02-27 | End: 2025-02-27

## 2025-02-27 RX ORDER — SODIUM CHLORIDE 9 MG/ML
INJECTION, SOLUTION INTRAVENOUS CONTINUOUS PRN
Status: DISCONTINUED | OUTPATIENT
Start: 2025-02-27 | End: 2025-02-27

## 2025-02-27 RX ORDER — CEFAZOLIN SODIUM 2 G/50ML
2000 SOLUTION INTRAVENOUS EVERY 8 HOURS
Status: DISCONTINUED | OUTPATIENT
Start: 2025-02-28 | End: 2025-02-27

## 2025-02-27 RX ORDER — PROPOFOL 10 MG/ML
INJECTION, EMULSION INTRAVENOUS AS NEEDED
Status: DISCONTINUED | OUTPATIENT
Start: 2025-02-27 | End: 2025-02-27

## 2025-02-27 RX ORDER — ROCURONIUM BROMIDE 10 MG/ML
INJECTION, SOLUTION INTRAVENOUS AS NEEDED
Status: DISCONTINUED | OUTPATIENT
Start: 2025-02-27 | End: 2025-02-27

## 2025-02-27 RX ORDER — FENTANYL CITRATE 50 UG/ML
INJECTION, SOLUTION INTRAMUSCULAR; INTRAVENOUS AS NEEDED
Status: DISCONTINUED | OUTPATIENT
Start: 2025-02-27 | End: 2025-02-27

## 2025-02-27 RX ORDER — OXYCODONE HYDROCHLORIDE 5 MG/1
5 TABLET ORAL EVERY 6 HOURS PRN
Status: DISCONTINUED | OUTPATIENT
Start: 2025-02-27 | End: 2025-03-03 | Stop reason: HOSPADM

## 2025-02-27 RX ORDER — CEFAZOLIN SODIUM 2 G/50ML
2000 SOLUTION INTRAVENOUS ONCE
Status: DISCONTINUED | OUTPATIENT
Start: 2025-02-27 | End: 2025-02-27 | Stop reason: HOSPADM

## 2025-02-27 RX ORDER — ASPIRIN 81 MG/1
81 TABLET, CHEWABLE ORAL 2 TIMES DAILY
Status: DISCONTINUED | OUTPATIENT
Start: 2025-02-27 | End: 2025-03-03 | Stop reason: HOSPADM

## 2025-02-27 RX ORDER — CEFAZOLIN SODIUM 2 G/50ML
SOLUTION INTRAVENOUS AS NEEDED
Status: DISCONTINUED | OUTPATIENT
Start: 2025-02-27 | End: 2025-02-27

## 2025-02-27 RX ORDER — PROPOFOL 10 MG/ML
INJECTION, EMULSION INTRAVENOUS CONTINUOUS PRN
Status: DISCONTINUED | OUTPATIENT
Start: 2025-02-27 | End: 2025-02-27

## 2025-02-27 RX ORDER — TRANEXAMIC ACID 10 MG/ML
1000 INJECTION, SOLUTION INTRAVENOUS ONCE
Status: DISCONTINUED | OUTPATIENT
Start: 2025-02-27 | End: 2025-02-27 | Stop reason: HOSPADM

## 2025-02-27 RX ORDER — BUPIVACAINE HYDROCHLORIDE 5 MG/ML
INJECTION, SOLUTION EPIDURAL; INTRACAUDAL AS NEEDED
Status: DISCONTINUED | OUTPATIENT
Start: 2025-02-27 | End: 2025-02-27 | Stop reason: HOSPADM

## 2025-02-27 RX ORDER — SODIUM CHLORIDE, SODIUM LACTATE, POTASSIUM CHLORIDE, CALCIUM CHLORIDE 600; 310; 30; 20 MG/100ML; MG/100ML; MG/100ML; MG/100ML
INJECTION, SOLUTION INTRAVENOUS CONTINUOUS PRN
Status: DISCONTINUED | OUTPATIENT
Start: 2025-02-27 | End: 2025-02-27

## 2025-02-27 RX ORDER — MAGNESIUM HYDROXIDE 1200 MG/15ML
LIQUID ORAL AS NEEDED
Status: DISCONTINUED | OUTPATIENT
Start: 2025-02-27 | End: 2025-02-27 | Stop reason: HOSPADM

## 2025-02-27 RX ORDER — CEFAZOLIN SODIUM 1 G/50ML
1000 SOLUTION INTRAVENOUS EVERY 8 HOURS
Status: COMPLETED | OUTPATIENT
Start: 2025-02-28 | End: 2025-02-28

## 2025-02-27 RX ADMIN — Medication 250 MG: at 09:47

## 2025-02-27 RX ADMIN — ACETAMINOPHEN 975 MG: 325 TABLET ORAL at 05:55

## 2025-02-27 RX ADMIN — ACETAMINOPHEN 975 MG: 325 TABLET ORAL at 13:23

## 2025-02-27 RX ADMIN — MEMANTINE 5 MG: 5 TABLET ORAL at 09:47

## 2025-02-27 RX ADMIN — OLANZAPINE 2.5 MG: 10 INJECTION, POWDER, FOR SOLUTION INTRAMUSCULAR at 21:45

## 2025-02-27 RX ADMIN — FENTANYL CITRATE 50 MCG: 50 INJECTION, SOLUTION INTRAMUSCULAR; INTRAVENOUS at 16:09

## 2025-02-27 RX ADMIN — FENTANYL CITRATE 50 MCG: 50 INJECTION, SOLUTION INTRAMUSCULAR; INTRAVENOUS at 16:16

## 2025-02-27 RX ADMIN — PROPOFOL 100 MG: 10 INJECTION, EMULSION INTRAVENOUS at 16:09

## 2025-02-27 RX ADMIN — Medication 400 MG: at 09:47

## 2025-02-27 RX ADMIN — DONEPEZIL HYDROCHLORIDE 5 MG: 5 TABLET ORAL at 21:44

## 2025-02-27 RX ADMIN — MIRTAZAPINE 15 MG: 15 TABLET, FILM COATED ORAL at 21:45

## 2025-02-27 RX ADMIN — ASPIRIN 81 MG CHEWABLE TABLET 81 MG: 81 TABLET CHEWABLE at 21:44

## 2025-02-27 RX ADMIN — PAROXETINE 40 MG: 20 TABLET, FILM COATED ORAL at 09:47

## 2025-02-27 RX ADMIN — CEFAZOLIN SODIUM 1000 MG: 1 SOLUTION INTRAVENOUS at 23:19

## 2025-02-27 RX ADMIN — MELATONIN TAB 3 MG 4.5 MG: 3 TAB at 21:44

## 2025-02-27 RX ADMIN — LIDOCAINE HYDROCHLORIDE 50 MG: 10 INJECTION, SOLUTION EPIDURAL; INFILTRATION; INTRACAUDAL; PERINEURAL at 16:09

## 2025-02-27 RX ADMIN — FENTANYL CITRATE 50 MCG: 50 INJECTION, SOLUTION INTRAMUSCULAR; INTRAVENOUS at 16:48

## 2025-02-27 RX ADMIN — SODIUM CHLORIDE, SODIUM LACTATE, POTASSIUM CHLORIDE, AND CALCIUM CHLORIDE: .6; .31; .03; .02 INJECTION, SOLUTION INTRAVENOUS at 16:00

## 2025-02-27 RX ADMIN — PANTOPRAZOLE SODIUM 40 MG: 40 TABLET, DELAYED RELEASE ORAL at 09:47

## 2025-02-27 RX ADMIN — CEFAZOLIN SODIUM 2000 MG: 2 SOLUTION INTRAVENOUS at 16:07

## 2025-02-27 RX ADMIN — PROPOFOL 90 MCG/KG/MIN: 10 INJECTION, EMULSION INTRAVENOUS at 16:18

## 2025-02-27 RX ADMIN — INSULIN LISPRO 1 UNITS: 100 INJECTION, SOLUTION INTRAVENOUS; SUBCUTANEOUS at 12:00

## 2025-02-27 RX ADMIN — SODIUM CHLORIDE: 9 INJECTION, SOLUTION INTRAVENOUS at 16:14

## 2025-02-27 RX ADMIN — SUGAMMADEX 200 MG: 100 INJECTION, SOLUTION INTRAVENOUS at 17:25

## 2025-02-27 RX ADMIN — ACETAMINOPHEN 975 MG: 325 TABLET ORAL at 21:45

## 2025-02-27 RX ADMIN — Medication 1000 UNITS: at 09:47

## 2025-02-27 RX ADMIN — ROCURONIUM BROMIDE 50 MG: 10 INJECTION, SOLUTION INTRAVENOUS at 16:09

## 2025-02-27 RX ADMIN — FENTANYL CITRATE 50 MCG: 50 INJECTION, SOLUTION INTRAMUSCULAR; INTRAVENOUS at 17:25

## 2025-02-27 RX ADMIN — LOSARTAN POTASSIUM 50 MG: 50 TABLET, FILM COATED ORAL at 09:47

## 2025-02-27 NOTE — CASE MANAGEMENT
Case Management Assessment & Discharge Planning Note    Patient name Sabrina Barclay  Location [address] [place] [address]/[address] [place] [address]-* MRN [id number]  : 3/25/1930 Date 2025       Current Admission Date: 2025  Current Admission Diagnosis:Closed fracture of left hip (HCC)   Patient Active Problem List    Diagnosis Date Noted Date Diagnosed    History of hysterectomy 2025     Closed fracture of left hip (HCC) 2025     GERD (gastroesophageal reflux disease) 06/10/2024     Constipation 06/10/2024     Dementia ([facility])      CKD (chronic kidney disease) stage 3, GFR 30-59 ml/min ([facility])      Wound of right breast 2024     Status post fall 2019     Hyponatremia 2019     Benign essential hypertension 2019     Type 2 diabetes mellitus, with long-term current use of insulin ([facility]) 2019     Depression 2019     Hyperlipidemia 2019       LOS (days): 1  Geometric Mean LOS (GMLOS) (days): 2.8  Days to GMLOS:2.1     OBJECTIVE:    Risk of Unplanned Readmission Score: 14.04      Current admission status: Inpatient     Preferred Pharmacy:   [facility] [address] [facility] [facility]  [address] [place] [address]  Phone: [phone] Fax: [phone]    Primary Care Provider: No primary care provider on file.    Primary Insurance: MEDICARE  Secondary Insurance: [employer]    ASSESSMENT:  Active Health Care Proxies       Miguel Angel Barclay [facility] Agent - Son   Primary Phone: [phone] (Home)  Mobile Phone: [phone]            Readmission Root Cause  30 Day Readmission: No    Patient Information  Admitted from:: Facility ([facility])  Mental Status: Alert  Assessment information provided by:: Other - please comment, Patient ([facility]ra [facility])  Primary Caregiver: Other (Comment)  Caregiver's Name:: Mitra Lopes  Caregiver's Relationship to Patient:: Facility Staff  Caregiver's Telephone Number:: [phone]  Support Systems: Son, Home care staff  [facility]  Residence: [place]  What [facility] do you live in?: [place]  Type of Current Residence: Facility ([facility])  Living Arrangements: Lives in Facility    Activities of Daily Living Prior to Admission  Functional Status: Assistance  Completes ADLs independently?: No  Level of ADL dependence: Assistance  Ambulates independently?: No  Level of ambulatory dependence: Assistance  Does patient use assisted devices?: Yes  Assisted Devices (DME) used: Wheelchair  Does the patient have a history of Short-Term Rehab?: Yes ([facility])     Patient Information Continued  Income Source: SSI/SSD  Does patient have prescription coverage?: Yes  Does patient receive dialysis treatments?: No     Means of Transportation  Means of Transport to Appts:: [facility]    DISCHARGE DETAILS:    Discharge planning discussed with:: Patient, [facility] nurse TRINH  Freedom of Choice: Yes  Comments - Freedom of Choice: Choice is for admission to [facility]. Patient consenting to blanket referrals to local area.  CM contacted family/caregiver?: Yes (Voicemail left for melo Michelle introducing role and requesting callback to discuss discharge plan.)  Were Treatment Team discharge recommendations reviewed with patient/caregiver?: Yes  Did patient/caregiver verbalize understanding of patient care needs?: Yes  Were patient/caregiver advised of the risks associated with not following Treatment Team discharge recommendations?: Yes    Contacts  Patient Contacts: Miguel Angel Barclay (son); Mitra Lopes  Relationship to Patient:: Family  Contact Method: Phone  Phone Number: [phone]; [phone]  Reason/Outcome: Emergency Contact, Continuity of Care, Discharge Planning    Other Referral/Resources/Interventions Provided:  Interventions: Short Term Rehab  Referral Comments: STR referrals sent via [employer]. Responses pending.     Treatment Team Recommendation: Short Term Rehab  Discharge Destination Plan:: Short Term Rehab

## 2025-02-27 NOTE — PROGRESS NOTES
Progress Note - Hospitalist   Name: Sabrina Barclay 89 y.o. female I MRN: [id number]  Unit/Bed#: [address] [place] [address] I Date of Admission: 2/26/2025   Date of Service: 2/27/2025 I Hospital Day: 1     Assessment & Plan  Closed fracture of left hip (HCC)  Patient presented from [facility] after a unwitnessed fall  CT head, CT cervical spine negative for acute pathology  X-ray hips shows left hip fracture but official read is pending  Received TXA in the ER after discussion with Ortho on-call  Keep n.p.o. after midnight for surgical intervention in a.m. per Ortho  Cardiology consulted for preop clearance  Repeat lab work in a.m.  PT/OT once cleared by Ortho  2/27 - Plan for OR this evening with orthopedic surgery. Pain currently well controlled. 2D echo ordered by cardiology. Follow up results.  Benign essential hypertension  Continue Cozaar and monitor blood pressures  Type 2 diabetes mellitus, with long-term current use of insulin ([facility])  Lab Results   Component Value Date    HGBA1C 7.7 (H) 02/26/2025       Recent Labs     02/27/25  0719   POCGLU 145*       On Lantus 15 units in a.m., hold for now as patient will be n.p.o.  Place patient on Accu-Cheks with SSI  Update A1c    Dementia ([facility])  Continue Namenda Aricept  CKD (chronic kidney disease) stage 3, GFR 30-59 ml/min ([facility])  Lab Results   Component Value Date    EGFR 43 02/27/2025    EGFR 48 02/26/2025    EGFR 54 06/13/2024    CREATININE 1.09 02/27/2025    CREATININE 1.00 02/26/2025    CREATININE 0.91 06/13/2024     Creatinine 1.09. Will continue to monitor.  Depression  Continue Paxil, Remeron  VTE Pharmacologic Prophylaxis: VTE Score: 11 Currently on hold in anticipation of orthopedic surgery intervention today.    Mobility:   Basic Mobility Inpatient Raw Score: 6  -HLM Goal: 2: Bed activities/Dependent transfer  -HLM Achieved: 2: Bed activities/Dependent transfer    Patient Centered Rounds: I performed bedside rounds with nursing staff today.     Education and  Discussions with Family / Patient: Attempted to call patient's son Miguel Angel however there was no answer. Voicemail left with request for call back.    Current Length of Stay: 1 day(s)  Current Patient Status: Inpatient   Certification Statement: The patient will continue to require additional inpatient hospital stay due to above.  Discharge Plan: TBD    Code Status: Level 3 - DNAR and DNI    Subjective   Patient was seen and examined at bedside. No acute events overnight. No new complaints. Pain currently well controlled. No chest pain or shortness of breath.    Objective :  Temp:  [97 °F (36.1 °C)-99 °F (37.2 °C)] 97.8 °F (36.6 °C)  HR:  [68-85] 69  BP: (109-177)/(56-86) 152/63  Resp:  [15-20] 18  SpO2:  [96 %-99 %] 98 %  O2 Device: None (Room air)    Body mass index is 25.47 kg/m².     Input and Output Summary (last 24 hours):   No intake or output data in the 24 hours ending 02/27/25 1043    Physical Exam  HENT:      Head: Normocephalic.      Mouth/Throat:      Mouth: Mucous membranes are moist.   Eyes:      Extraocular Movements: Extraocular movements intact.   Cardiovascular:      Rate and Rhythm: Normal rate.   Pulmonary:      Effort: Pulmonary effort is normal.   Abdominal:      Palpations: Abdomen is soft.      Tenderness: There is no abdominal tenderness.   Skin:     General: Skin is warm.   Neurological:      Mental Status: She is alert.   Psychiatric:         Mood and Affect: Mood normal.         Behavior: Behavior normal.       Lines/Drains:  Lines/Drains/Airways       Active Status       Name Placement date Placement time Site Days    External Urinary Catheter 02/27/25  0600  -- less than 1                    Lab Results: I have reviewed the following results:   Results from last 7 days   Lab Units 02/27/25  0635 02/26/25  1821   WBC Thousand/uL 7.83 7.26   HEMOGLOBIN g/dL 10.3* 11.5   HEMATOCRIT % 32.5* 36.9   PLATELETS Thousands/uL 264 297   SEGS PCT %  --  64   LYMPHO PCT %  --  25   MONO PCT %  --  7    EOS PCT %  --  2     Results from last 7 days   Lab Units 02/27/25  0635 02/26/25  1821   SODIUM mmol/L 133* 134*   POTASSIUM mmol/L 4.4 4.2   CHLORIDE mmol/L 102 102   CO2 mmol/L 21 22   BUN mg/dL 17 15   CREATININE mg/dL 1.09 1.00   ANION GAP mmol/L 10 10   CALCIUM mg/dL 9.0 9.6   ALBUMIN g/dL  --  3.9   TOTAL BILIRUBIN mg/dL  --  0.28   ALK PHOS U/L  --  87   ALT U/L  --  8   AST U/L  --  13   GLUCOSE RANDOM mg/dL 147* 128     Results from last 7 days   Lab Units 02/27/25  0635   INR  1.04     Results from last 7 days   Lab Units 02/27/25  0719   POC GLUCOSE mg/dl 145*     Results from last 7 days   Lab Units 02/26/25  1821   HEMOGLOBIN A1C % 7.7*     Last 24 Hours Medication List:     Current Facility-Administered Medications:     acetaminophen (TYLENOL) tablet 975 mg, Q8H JAI    Cholecalciferol (VITAMIN D3) tablet 1,000 Units, Daily    donepezil (ARICEPT) tablet 5 mg, HS    [Held by provider] insulin glargine (LANTUS) subcutaneous injection 15 Units 0.15 mL, QAM    insulin lispro (HumALOG/ADMELOG) 100 units/mL subcutaneous injection 1-5 Units, TID AC **AND** Fingerstick Glucose (POCT), TID AC    losartan (COZAAR) tablet 50 mg, Daily    magnesium Oxide (MAG-OX) tablet 400 mg, Daily    melatonin tablet 4.5 mg, HS    memantine (NAMENDA) tablet 5 mg, BID    mirtazapine (REMERON) tablet 15 mg, HS    pantoprazole (PROTONIX) EC tablet 40 mg, Daily    PARoxetine (PAXIL) tablet 40 mg, QAM    saccharomyces boulardii (FLORASTOR) capsule 250 mg, BID    Administrative Statements   Today, Patient Was Seen By: Gildardo Gunderson MD    **Please Note: This note may have been constructed using a voice recognition system.**

## 2025-02-27 NOTE — ASSESSMENT & PLAN NOTE
Patient presented from [facility] after a unwitnessed fall  CT head, CT cervical spine negative for acute pathology  X-ray hips shows left hip fracture but official read is pending  Received TXA in the ER after discussion with Ortho on-call  Keep n.p.o. after midnight for surgical intervention in a.m. per Ortho  Cardiology consulted for preop clearance  Repeat lab work in a.m.  PT/OT once cleared by Ortho  2/27 - Plan for OR this evening with orthopedic surgery. Pain currently well controlled. 2D echo ordered by cardiology. Follow up results.

## 2025-02-27 NOTE — H&P (VIEW-ONLY)
Consultation - Orthopedics   Name: Sabrina Barclay 89 y.o. female I MRN: [id number]  Unit/Bed#: [address] [address]01 I Date of Admission: 2/26/2025   Date of Service: 2/27/2025 I Hospital Day: 1     Inpatient consult to Orthopedic Surgery  Consult performed by: Viki Carreon PA-C  Consult ordered by: Loan Rogers DO        Physician Requesting Evaluation: Gildardo Gunderson MD   Reason for Evaluation / Principal Problem: left hip pain and inability to bear weight    Assessment & Plan  Closed fracture of left hip (HCC)  Left intertrochanteric fracture s/p unwitnessed fall at her care facility yesterday.   Plan for OR today with Dr. Rosario for left femur IM nail insertion    - Remain NPO    - Consent to be obtained by Dr. Rosario prior to OR    - Case request placed  NWB LLE  Analgesics prn per primary team  DVT ppx with SCDs. Hold chemical DVT ppx until after surgery - can start ASA 81 mg BID.   TXA given in the ED yesterday  Obtain medical clearance from Medicine and Cardiology prior to OR. Continue medical management per primary team  Ortho will follow  Benign essential hypertension    Type 2 diabetes mellitus, with long-term current use of insulin ([facility])  Lab Results   Component Value Date    HGBA1C 7.7 (H) 02/26/2025       Recent Labs     02/27/25  0719   POCGLU 145*       Blood Sugar Average: Last 72 hrs:  (P) 145    Depression    Dementia ([facility])    CKD (chronic kidney disease) stage 3, GFR 30-59 ml/min ([facility])  Lab Results   Component Value Date    EGFR 43 02/27/2025    EGFR 48 02/26/2025    EGFR 54 06/13/2024    CREATININE 1.09 02/27/2025    CREATININE 1.00 02/26/2025    CREATININE 0.91 06/13/2024         History of Present Illness   HPI: Sabrina Barclay is a 89 y.o. year old female who presents with left hip pain and inability to bear weight after an unwitnessed fall at her care facility yesterday. Per ED note, the patient reports hitting her head, but did not lose consciousness and has recollection of the fall.  She is not on blood thinners at baseline Today, the patient localizes her pain to the upper leg and knee. There is no bruising or significant swelling. The patient states her son is in charge of her medical decision making.    Review of Systems   Constitutional:  Positive for activity change. Negative for chills and fever.   Respiratory:  Negative for cough and shortness of breath.    Cardiovascular:  Negative for chest pain and palpitations.   Gastrointestinal:  Negative for nausea and vomiting.   Musculoskeletal:  Positive for arthralgias, gait problem and myalgias. Negative for back pain and joint swelling.   Skin:  Negative for color change, rash and wound.   Neurological:  Positive for weakness. Negative for dizziness and numbness.   Psychiatric/Behavioral:  Negative for agitation, behavioral problems and confusion.    All other systems reviewed and are negative.     Historical Information   Past Medical History:   Diagnosis Date    Anxiety     CKD (chronic kidney disease) stage 3, GFR 30-59 ml/min ([facility])     Dementia (HCC)     Depression     Diabetes mellitus (HCC)     Gait difficulty     GERD (gastroesophageal reflux disease)     Hyperlipidemia     Hypertension     Incontinence of urine     Macula lutea degeneration     Neurocognitive disorder     Osteoarthritis      Past Surgical History:   Procedure Laterality Date    APPENDECTOMY      HYSTERECTOMY      ORIF SHOULDER DISLOCATION W/ HUMERAL FRACTURE       Social History     Tobacco Use    Smoking status: Never    Smokeless tobacco: Never   Vaping Use    Vaping status: Never Used   Substance and Sexual Activity    Alcohol use: Not Currently    Drug use: Not Currently    Sexual activity: Not Currently     E-Cigarette/Vaping    E-Cigarette Use Never User      E-Cigarette/Vaping Substances    Nicotine No     THC No     CBD No     Flavoring No     Other No     Unknown No      Family history non-contributory    Objective :  Temp:  [97 °F (36.1 °C)-99 °F (37.2 °C)]  97.8 °F (36.6 °C)  HR:  [68-85] 69  BP: (109-177)/(56-86) 152/63  Resp:  [15-20] 18  SpO2:  [96 %-99 %] 98 %  O2 Device: None (Room air)  Physical Exam  Vitals and nursing note reviewed.   Constitutional:       General: She is not in acute distress.     Appearance: She is well-developed. She is not diaphoretic.   HENT:      Head: Normocephalic and atraumatic.   Eyes:      Extraocular Movements: Extraocular movements intact.      Conjunctiva/sclera: Conjunctivae normal.   Cardiovascular:      Rate and Rhythm: Normal rate.      Pulses: Normal pulses.   Pulmonary:      Effort: Pulmonary effort is normal. No respiratory distress.      Breath sounds: Normal breath sounds. No wheezing.   Abdominal:      General: Abdomen is flat.      Palpations: Abdomen is soft.   Musculoskeletal:         General: Tenderness present. No swelling.      Cervical back: Normal range of motion and neck supple.      Right lower leg: No edema.      Left lower leg: No edema.   Skin:     General: Skin is warm and dry.      Capillary Refill: Capillary refill takes less than 2 seconds.      Findings: No bruising or erythema.   Neurological:      General: No focal deficit present.      Mental Status: She is alert and oriented to person, place, and time.   Psychiatric:         Mood and Affect: Mood normal.         Behavior: Behavior normal.         Ortho Exam   Musculoskeletal: Left hip  Skin intact. No erythema or ecchymosis.  Hip ROM and strength deferred due to known fracture  Motor intact to +FHL/EHL, +ankle dorsi/plantar flexion  Sensation intact to saphenous, sural, tibial, superficial peroneal nerve, and deep peroneal  2+ DP pulse  Brisk Cap refill  No calf swelling or tenderness to palpation  Musculature soft and compressible. No pain to passive stretch.      Lab Results: I have reviewed the following results:   Recent Labs     02/26/25  1821 02/27/25  0635   WBC 7.26 7.83   HGB 11.5 10.3*   HCT 36.9 32.5*    264   BUN 15 17    CREATININE 1.00 1.09   INR  --  1.04     Blood Culture:   Lab Results   Component Value Date    BLOODCX No Growth After 5 Days. 06/09/2024    BLOODCX No Growth After 5 Days. 06/09/2024     Wound Culture:   Lab Results   Component Value Date    WOUNDCULT 4+ Growth of Corynebacterium striatum group (A) 06/09/2024    WOUNDCULT 2+ Growth of Proteus mirabilis (A) 06/09/2024       Imaging Results Review: I personally reviewed the following image studies in PACS and associated radiology reports: xray(s). My interpretation of the radiology images/reports is: comminuted intertrochanteric left femur fracture with moderate angulation. Hip and knee DJD evident.  Other Study Results Review: No additional pertinent studies reviewed.

## 2025-02-27 NOTE — ANESTHESIA POSTPROCEDURE EVALUATION
Post-Op Assessment Note    CV Status:  Stable    Pain management: adequate       Mental Status:  Alert and awake   Hydration Status:  Euvolemic   PONV Controlled:  Controlled   Airway Patency:  Patent     Post Op Vitals Reviewed: Yes    No anethesia notable event occurred.    Staff: Anesthesiologist           Last Filed PACU Vitals:  Vitals Value Taken Time   Temp     Pulse 97 02/27/25 1800   /63 02/27/25 1800   Resp 20 02/27/25 1800   SpO2 98 % 02/27/25 1745       Modified Tanner:     Vitals Value Taken Time   Activity 2 02/27/25 1745   Respiration 2 02/27/25 1745   Circulation 2 02/27/25 1745   Consciousness 1 02/27/25 1745   Oxygen Saturation 2 02/27/25 1745     Modified Tanner Score: 9

## 2025-02-27 NOTE — ASSESSMENT & PLAN NOTE
Left intertrochanteric fracture s/p unwitnessed fall at her care facility yesterday.   Plan for OR today with Dr. Rosario for left femur IM nail insertion    - Remain NPO    - Consent to be obtained by Dr. Rosario prior to OR    - Case request placed  NWB LLE  Analgesics prn per primary team  DVT ppx with SCDs. Hold chemical DVT ppx until after surgery - can start ASA 81 mg BID.   TXA given in the ED yesterday  Obtain medical clearance from Medicine and Cardiology prior to OR. Continue medical management per primary team  Ortho will follow

## 2025-02-27 NOTE — WOUND OSTOMY CARE
Consult Note - Wound   Sabrina Barclay 89 y.o. female MRN: [id number]  Unit/Bed#: [address] [place] [address] Encounter: [id number]        History and Present Illness:  Patient is a 89 yo female that was admitted to [facility] for treatment of left hip fracture.  Patient has a PMH of dementia and CKD. Incontinent of bowel and bladder.     Wound Care was consulted for right breast wound. Virtual wound care consult completed via discussion with primary RN and review of images in media. Primary Rn reports no additional wounds other than the breast wound that is noted below. Preventative orders placed.      Right Breast Wound: irregular in shape with at least full thickness skin loss. Wound bed with beefy red tissue and yellow slough tissue. Sarah Beth-wound is fragile. At least moderate drainage noted on previous dressing recommend silver alginate and ABD.         Orders listed below and wound care will continue to follow, call or Secure Chat Message with questions.     Skin Care Plan:  1-Right Breast Wound: cleanse with NSS and pat dry. Apply silver alginate (Melgisorb Ag) to the wound bed, cover with an ABD, and secure with minimal tape. Change daily or PRN.   2-Turn/reposition q2h or when medically stable for pressure re-distribution on skin.  3-Elevate heels to offload pressure.  4-Moisturize skin daily with skin nourishing cream  5-Ehob cushion in chair when out of bed.  6-Preventative Hydraguard to bilateral sacro-buttocks and heels BID and PRN.   7-Ambulatory referral to the outpatient wound center placed. Please follow-up at discharge           Mackenzie Nowak RN, BSN, CWOCN

## 2025-02-27 NOTE — ASSESSMENT & PLAN NOTE
"Lab Results   Component Value Date    HGBA1C 6.2 (H) 06/09/2024       No results for input(s): \"POCGLU\" in the last 72 hours.    On Lantus 15 units in a.m., hold for now as patient will be n.p.o.  Place patient on Accu-Cheks with SSI  Update A1c    "

## 2025-02-27 NOTE — DISCHARGE INSTR - OTHER ORDERS
Skin Care Plan:  1-Right Breast Wound: cleanse with NSS and pat dry. Apply silver alginate (Melgisorb Ag) to the wound bed, cover with an ABD, and secure with minimal tape. Change daily or PRN.   2-Turn/reposition q2h or when medically stable for pressure re-distribution on skin.  3-Elevate heels to offload pressure.  4-Moisturize skin daily with skin nourishing cream  5-Ehob cushion in chair when out of bed.  6-Preventative Hydraguard to bilateral sacro-buttocks and heels BID and PRN.   7-Ambulatory referral to the outpatient wound center placed. Please follow-up at discharge       Schedule Follow-up Outpatient Wound Appointment. Ambulatory Referral Placed.   Call Outpatient Wound Center @ [phone]   Option 1: [place], [place], [place], [place]  Option 2: [place], [place]

## 2025-02-27 NOTE — INTERVAL H&P NOTE
H&P reviewed. After examining the patient I find no changes in the patients condition since the H&P had been written.    Vitals:    02/27/25 1035   BP: 152/63   Pulse: 69   Resp:    Temp:    SpO2:        Cardiovascular:      Rate and Rhythm: Normal rate.      Pulses: Normal pulses.   Pulmonary:      Effort: Pulmonary effort is normal. No respiratory distress.      Breath sounds: Normal breath sounds. No wheezing.   Abdominal:      General: Abdomen is flat.      Palpations: Abdomen is soft.   Musculoskeletal:         General: Tenderness present. No swelling.      Cervical back: Normal range of motion and neck supple.      Right lower leg: No edema.      Left lower leg: No edema.

## 2025-02-27 NOTE — PLAN OF CARE
Problem: PAIN - ADULT  Goal: Verbalizes/displays adequate comfort level or baseline comfort level  Description: Interventions:  - Encourage patient to monitor pain and request assistance  - Assess pain using appropriate pain scale  - Administer analgesics based on type and severity of pain and evaluate response  - Implement non-pharmacological measures as appropriate and evaluate response  - Consider cultural and social influences on pain and pain management  - Notify physician/advanced practitioner if interventions unsuccessful or patient reports new pain  Outcome: Progressing     Problem: INFECTION - ADULT  Goal: Absence or prevention of progression during hospitalization  Description: INTERVENTIONS:  - Assess and monitor for signs and symptoms of infection  - Monitor lab/diagnostic results  - Monitor all insertion sites, i.e. indwelling lines, tubes, and drains  - Monitor endotracheal if appropriate and nasal secretions for changes in amount and color  - [place] appropriate cooling/warming therapies per order  - Administer medications as ordered  - Instruct and encourage patient and family to use good hand hygiene technique  - Identify and instruct in appropriate isolation precautions for identified infection/condition  Outcome: Progressing  Goal: Absence of fever/infection during neutropenic period  Description: INTERVENTIONS:  - Monitor WBC    Outcome: Progressing     Problem: SAFETY ADULT  Goal: Patient will remain free of falls  Description: INTERVENTIONS:  - Educate patient/family on patient safety including physical limitations  - Instruct patient to call for assistance with activity   - Consult OT/PT to assist with strengthening/mobility   - Keep Call bell within reach  - Keep bed low and locked with side rails adjusted as appropriate  - Keep care items and personal belongings within reach  - Initiate and maintain comfort rounds  - Make Fall Risk Sign visible to staff  - Apply yellow socks and bracelet  for high fall risk patients  - Consider moving patient to room near nurses station  Outcome: Progressing  Goal: Maintain or return to baseline ADL function  Description: INTERVENTIONS:  -  Assess patient's ability to carry out ADLs; assess patient's baseline for ADL function and identify physical deficits which impact ability to perform ADLs (bathing, care of mouth/teeth, toileting, grooming, dressing, etc.)  - Assess/evaluate cause of self-care deficits   - Assess range of motion  - Assess patient's mobility; develop plan if impaired  - Assess patient's need for assistive devices and provide as appropriate  - Encourage maximum independence but intervene and supervise when necessary  - Involve family in performance of ADLs  - Assess for home care needs following discharge   - Consider OT consult to assist with ADL evaluation and planning for discharge  - Provide patient education as appropriate  Outcome: Progressing  Goal: Maintains/Returns to pre admission functional level  Description: INTERVENTIONS:  - Perform AM-PAC 6 Click Basic Mobility/ Daily Activity assessment daily.  - Set and communicate daily mobility goal to care team and patient/family/caregiver.   - Collaborate with rehabilitation services on mobility goals if consulted  - Out of bed for toileting  - Record patient progress and toleration of activity level   Outcome: Progressing     Problem: DISCHARGE PLANNING  Goal: Discharge to home or other facility with appropriate resources  Description: INTERVENTIONS:  - Identify barriers to discharge w/patient and caregiver  - Arrange for needed discharge resources and transportation as appropriate  - Identify discharge learning needs (meds, wound care, etc.)  - Arrange for interpretive services to assist at discharge as needed  - Refer to Case Management Department for coordinating discharge planning if the patient needs post-hospital services based on physician/advanced practitioner order or complex needs  related to functional status, cognitive ability, or social support system  Outcome: Progressing     Problem: Knowledge Deficit  Goal: Patient/family/caregiver demonstrates understanding of disease process, treatment plan, medications, and discharge instructions  Description: Complete learning assessment and assess knowledge base.  Interventions:  - Provide teaching at level of understanding  - Provide teaching via preferred learning methods  Outcome: Progressing     Problem: Nutrition/Hydration-ADULT  Goal: Nutrient/Hydration intake appropriate for improving, restoring or maintaining nutritional needs  Description: Monitor and assess patient's nutrition/hydration status for malnutrition. Collaborate with interdisciplinary team and initiate plan and interventions as ordered.  Monitor patient's weight and dietary intake as ordered or per policy. Utilize nutrition screening tool and intervene as necessary. Determine patient's food preferences and provide high-protein, high-caloric foods as appropriate.     INTERVENTIONS:  - Monitor oral intake, urinary output, labs, and treatment plans  - Assess nutrition and hydration status and recommend course of action  - Evaluate amount of meals eaten  - Assist patient with eating if necessary   - Allow adequate time for meals  - Recommend/ encourage appropriate diets, oral nutritional supplements, and vitamin/mineral supplements  - Order, calculate, and assess calorie counts as needed  - Recommend, monitor, and adjust tube feedings and TPN/PPN based on assessed needs  - Assess need for intravenous fluids  - Provide specific nutrition/hydration education as appropriate  - Include patient/family/caregiver in decisions related to nutrition  Outcome: Progressing     Problem: Prexisting or High Potential for Compromised Skin Integrity  Goal: Skin integrity is maintained or improved  Description: INTERVENTIONS:  - Identify patients at risk for skin breakdown  - Assess and monitor skin  integrity  - Assess and monitor nutrition and hydration status  - Monitor labs   - Assess for incontinence   - Turn and reposition patient  - Assist with mobility/ambulation  - Relieve pressure over bony prominences  - Avoid friction and shearing  - Provide appropriate hygiene as needed including keeping skin clean and dry  - Evaluate need for skin moisturizer/barrier cream  - Collaborate with interdisciplinary team   - Patient/family teaching  - Consider wound care consult   Outcome: Progressing

## 2025-02-27 NOTE — H&P
"H&P - Hospitalist   Name: Sabrina Barclay 89 y.o. female I MRN: [id number]  Unit/Bed#: [address] [place] [address] I Date of Admission: 2/26/2025   Date of Service: 2/26/2025 I Hospital Day: 0     Assessment & Plan  Closed fracture of left hip (HCC)  Patient presented from [facility] after a unwitnessed fall  CT head, CT cervical spine negative for acute pathology  X-ray hips shows left hip fracture but official read is pending  Received TXA in the ER after discussion with Ortho on-call  Keep n.p.o. after midnight for surgical intervention in a.m. per Ortho  Cardiology consulted for preop clearance  Repeat lab work in a.m.  PT/OT once cleared by Ortho  Benign essential hypertension  Continue Cozaar and monitor blood pressures  Type 2 diabetes mellitus, with long-term current use of insulin ([facility])  Lab Results   Component Value Date    HGBA1C 6.2 (H) 06/09/2024       No results for input(s): \"POCGLU\" in the last 72 hours.    On Lantus 15 units in a.m., hold for now as patient will be n.p.o.  Place patient on Accu-Cheks with SSI  Update A1c    Dementia ([facility])  Continue Namenda Aricept  CKD (chronic kidney disease) stage 3, GFR 30-59 ml/min ([facility])  Lab Results   Component Value Date    EGFR 48 02/26/2025    EGFR 54 06/13/2024    EGFR 45 06/11/2024    CREATININE 1.00 02/26/2025    CREATININE 0.91 06/13/2024    CREATININE 1.05 06/11/2024     Creatinine appears close to baseline.  Repeat lab work in a.m.  Depression  Continue Paxil, Remeron      VTE Pharmacologic Prophylaxis:    None for OR tomorrow  Code Status: Level 3 - DNAR and DNI   Discussion with family: Attempted to update  (son) via phone. Left voicemail.  On home number.  Tried cell number twice but call could not go through    Anticipated Length of Stay: Patient will be admitted on an inpatient basis with an anticipated length of stay of greater than 2 midnights secondary to hip fracture requiring surgical intervention.    History of Present Illness   Chief " Complaint: Fall    Sabrina Barclay is a 89 y.o. female with a PMH of dementia, CKD who presents status post fall at assisted living facility.  Fall was unwitnessed.  Patient is not the greatest historian given her dementia and states that she tried to get something from the closet and next thing she knows she landed on the floor.  States she was unable to get off the floor.  Patient denies any pain at rest but did report left hip pain to the nursing staff with movements.  States she normally uses a wheelchair for ambulation.    Review of Systems   Unable to perform ROS: Dementia       Historical Information   Past Medical History:   Diagnosis Date    Anxiety     CKD (chronic kidney disease) stage 3, GFR 30-59 ml/min ([facility])     Dementia ([facility])     Depression     Diabetes mellitus ([facility])     Gait difficulty     GERD (gastroesophageal reflux disease)     Hyperlipidemia     Hypertension     Incontinence of urine     Macula lutea degeneration     Neurocognitive disorder     Osteoarthritis      Past Surgical History:   Procedure Laterality Date    APPENDECTOMY      HYSTERECTOMY      ORIF SHOULDER DISLOCATION W/ HUMERAL FRACTURE       Social History     Tobacco Use    Smoking status: Never    Smokeless tobacco: Never   Vaping Use    Vaping status: Never Used   Substance and Sexual Activity    Alcohol use: Not Currently    Drug use: Not Currently    Sexual activity: Not Currently     E-Cigarette/Vaping    E-Cigarette Use Never User      E-Cigarette/Vaping Substances    Nicotine No     THC No     CBD No     Flavoring No     Other No     Unknown No      Family History   Problem Relation Age of Onset    Heart disease Mother     No Known Problems Father     No Known Problems Sister     No Known Problems Brother     No Known Problems Maternal Aunt     No Known Problems Maternal Uncle     No Known Problems Paternal Aunt     No Known Problems Paternal Uncle     No Known Problems Maternal Grandmother     No Known Problems Maternal  Grandfather     No Known Problems Paternal Grandmother     No Known Problems Paternal Grandfather     ADD / ADHD Neg Hx     Anesthesia problems Neg Hx     Cancer Neg Hx     Clotting disorder Neg Hx     Collagen disease Neg Hx     Diabetes Neg Hx     Dislocations Neg Hx     Learning disabilities Neg Hx     Neurological problems Neg Hx     Osteoporosis Neg Hx     Rheumatologic disease Neg Hx     Scoliosis Neg Hx     Vascular Disease Neg Hx      Social History:  Marital Status:    Occupation: None  Patient Pre-hospital Living Situation: Assisted Living  Patient Pre-hospital Level of Mobility:  Wheelchair  Patient Pre-hospital Diet Restrictions: None    Meds/Allergies   I have reviewed home medications using recent Epic encounter.  Prior to Admission medications    Medication Sig Start Date End Date Taking? Authorizing Provider   acetaminophen (TYLENOL) 325 mg tablet Take 650 mg by mouth 2 (two) times a day    Historical Provider, MD   acetaminophen (TYLENOL) 500 mg tablet Take 1,000 mg by mouth every 6 (six) hours as needed for mild pain     Historical Provider, MD   cholecalciferol (VITAMIN D3) 400 units tablet Take 1,000 Units by mouth daily    Historical Provider, MD   cycloSPORINE (RESTASIS) 0.05 % ophthalmic emulsion Administer 1 drop to both eyes 2 (two) times a day.    Historical Provider, MD   Diclofenac Sodium (VOLTAREN) 1 % Apply 2 g topically 3 (three) times a day as needed (TO RIGHT KNEE)    Historical Provider, MD   donepezil (ARICEPT) 5 mg tablet Take 5 mg by mouth daily at bedtime    Historical Provider, MD   Fesoterodine Fumarate ER 8 MG TB24 Take 8 mg by mouth in the morning    Historical Provider, MD   Insulin Glargine Solostar (Basaglar KwikPen) 100 UNIT/ML SOPN Inject 15 Units under the skin in the morning    Historical Provider, MD   loperamide (IMODIUM) 2 mg capsule Take 2 mg by mouth in the morning    Historical Provider, MD   losartan (COZAAR) 50 mg tablet Take 50 mg by mouth daily.     Historical Provider, MD   MAGNESIUM OXIDE 400 PO Take 400 mg by mouth in the morning    Historical Provider, MD   Melatonin 3-10 MG TABS Take 5 mg by mouth daily at bedtime    Historical Provider, MD   memantine (NAMENDA) 5 mg tablet Take 5 mg by mouth 2 (two) times a day    Historical Provider, MD   metFORMIN (GLUCOPHAGE) 500 mg tablet Take 500 mg by mouth daily with breakfast    Historical Provider, MD   mirtazapine (Remeron) 15 mg tablet Take 15 mg by mouth daily at bedtime    Historical Provider, MD   pantoprazole (PROTONIX) 40 mg tablet Take 40 mg by mouth daily.    Historical Provider, MD   PARoxetine (PAXIL) 30 mg tablet Take 40 mg by mouth every morning      Historical Provider, MD   saccharomyces boulardii (FLORASTOR) 250 mg capsule Take 250 mg by mouth 2 (two) times a day    Historical Provider, MD     No Known Allergies    Objective :  Temp:  [99 °F (37.2 °C)] 99 °F (37.2 °C)  HR:  [68] 68  BP: (177)/(86) 177/86  Resp:  [20] 20  SpO2:  [96 %] 96 %  O2 Device: None (Room air)    Physical Exam  Constitutional:       General: She is not in acute distress.     Appearance: She is not toxic-appearing.   HENT:      Head: Normocephalic and atraumatic.   Eyes:      General: No scleral icterus.  Cardiovascular:      Rate and Rhythm: Normal rate and regular rhythm.   Pulmonary:      Effort: Pulmonary effort is normal. No respiratory distress.      Breath sounds: Normal breath sounds. No wheezing or rales.   Abdominal:      General: Bowel sounds are normal. There is no distension.      Palpations: Abdomen is soft.      Tenderness: There is no abdominal tenderness.   Musculoskeletal:      Right lower leg: No edema.      Left lower leg: No edema.      Comments: Left lower extremity shortened and externally rotated   Neurological:      Mental Status: She is alert.      Comments: Oriented to self and time         Lines/Drains:            Lab Results: I have reviewed the following results:  Results from last 7 days   Lab  Units 02/26/25  1821   WBC Thousand/uL 7.26   HEMOGLOBIN g/dL 11.5   HEMATOCRIT % 36.9   PLATELETS Thousands/uL 297   SEGS PCT % 64   LYMPHO PCT % 25   MONO PCT % 7   EOS PCT % 2     Results from last 7 days   Lab Units 02/26/25  1821   SODIUM mmol/L 134*   POTASSIUM mmol/L 4.2   CHLORIDE mmol/L 102   CO2 mmol/L 22   BUN mg/dL 15   CREATININE mg/dL 1.00   ANION GAP mmol/L 10   CALCIUM mg/dL 9.6   ALBUMIN g/dL 3.9   TOTAL BILIRUBIN mg/dL 0.28   ALK PHOS U/L 87   ALT U/L 8   AST U/L 13   GLUCOSE RANDOM mg/dL 128             Lab Results   Component Value Date    HGBA1C 6.2 (H) 06/09/2024    HGBA1C 6.6 (H) 05/22/2019           Imaging Results Review: I reviewed radiology reports from this admission including: CT head, CT C-spine, and xray(s).  Other Study Results Review: Other studies reviewed include: Echo    Administrative Statements       ** Please Note: This note has been constructed using a voice recognition system. **

## 2025-02-27 NOTE — ASSESSMENT & PLAN NOTE
Lab Results   Component Value Date    EGFR 48 02/26/2025    EGFR 54 06/13/2024    EGFR 45 06/11/2024    CREATININE 1.00 02/26/2025    CREATININE 0.91 06/13/2024    CREATININE 1.05 06/11/2024     Creatinine appears close to baseline.  Repeat lab work in a.m.

## 2025-02-27 NOTE — ASSESSMENT & PLAN NOTE
Lab Results   Component Value Date    HGBA1C 7.7 (H) 02/26/2025       Recent Labs     02/27/25  0719   POCGLU 145*       Blood Sugar Average: Last 72 hrs:  (P) 145

## 2025-02-27 NOTE — ASSESSMENT & PLAN NOTE
Surgery planned; seeking preop clearance  Echo showing EF 65% w/ normal systolic function and G1DD   Follow-up repeat EKG

## 2025-02-27 NOTE — ASSESSMENT & PLAN NOTE
Lab Results   Component Value Date    EGFR 43 02/27/2025    EGFR 48 02/26/2025    EGFR 54 06/13/2024    CREATININE 1.09 02/27/2025    CREATININE 1.00 02/26/2025    CREATININE 0.91 06/13/2024     Creatinine 1.09. Will continue to monitor.

## 2025-02-27 NOTE — CONSULTS
Consultation - Cardiology   Name: Sabrina Barclay 89 y.o. female I MRN: [id number]  Unit/Bed#: 3 [address] Date of Admission: 2/26/2025   Date of Service: 2/27/2025 I Hospital Day: 1   Inpatient consult to Cardiology  Consult performed by: Ele Mckenzie MD  Consult ordered by: Loan Rogers DO        Physician Requesting Evaluation: Gildardo Gunderson MD   Reason for Evaluation / Principal Problem: preop clearance    Assessment & Plan  Closed fracture of left hip (HCC)  Surgery planned; seeking preop clearance  Echo showing EF 65% w/ normal systolic function and G1DD   Follow-up repeat EKG  Benign essential hypertension  History of  Maintained on losartan 50 mg  Dementia (HCC)  Follow-up with family for additional history      History of Present Illness   Sabrina Barclay is a 89 y.o. female with a PMH of dementia, CKD, HTN, GERD, HLD, who presents after an unwitnessed fall. Patient is a limited historian given advanced dementia. Normally uses a wheelchair for ambulation. She reports that her left hip is hurting her significantly but denies any further symptomology. Patient is able to lie flat in bed with no SOB. Patient reports she is comfortable at rest with pain on movement.     Review of Systems   Constitutional:         ROS limited by dementia     HENT: Negative.     Musculoskeletal:  Positive for arthralgias and gait problem.   Skin: Negative.      Medical History Review: I have reviewed the patient's PMH, PSH, Social History, Family History, Meds, and Allergies     Objective :  Temp:  [97 °F (36.1 °C)-99 °F (37.2 °C)] 97.8 °F (36.6 °C)  HR:  [68-85] 69  BP: (109-177)/(56-86) 152/63  Resp:  [15-20] 18  SpO2:  [96 %-99 %] 98 %  O2 Device: None (Room air)  Orthostatic Blood Pressures      Flowsheet Row Most Recent Value   Blood Pressure 152/63 filed at 02/27/2025 0957   Patient Position - Orthostatic VS Lying filed at 02/27/2025 0944          First Weight: Weight - Scale: 67.3 kg (148 lb 5.9 oz)  (02/26/25 1800)  Vitals:    02/26/25 1800 02/26/25 2230   Weight: 67.3 kg (148 lb 5.9 oz) 67.3 kg (148 lb 5.9 oz)       Physical Exam  Vitals and nursing note reviewed.   Constitutional:       General: She is not in acute distress.     Appearance: She is well-developed. She is not diaphoretic.   HENT:      Head: Normocephalic and atraumatic.   Eyes:      General: No scleral icterus.        Right eye: No discharge.         Left eye: No discharge.      Extraocular Movements: Extraocular movements intact.      Conjunctiva/sclera: Conjunctivae normal.   Cardiovascular:      Rate and Rhythm: Normal rate.      Pulses: Normal pulses.   Pulmonary:      Effort: Pulmonary effort is normal. No respiratory distress.      Breath sounds: Normal breath sounds. No wheezing.   Abdominal:      General: Abdomen is flat. There is no distension.      Palpations: Abdomen is soft.      Tenderness: There is no abdominal tenderness. There is no guarding.   Musculoskeletal:         General: Tenderness present. No swelling.      Cervical back: Normal range of motion and neck supple.      Right lower leg: No edema.      Left lower leg: No edema.   Skin:     General: Skin is warm and dry.      Capillary Refill: Capillary refill takes less than 2 seconds.      Findings: No bruising or erythema.   Neurological:      General: No focal deficit present.      Mental Status: She is alert and oriented to person, place, and time.      Gait: Gait abnormal (wheelchair for ambulation at baseline).   Psychiatric:         Mood and Affect: Mood normal.         Behavior: Behavior normal.         Lab Results: I have reviewed the following results:  Results from last 7 days   Lab Units 02/27/25  0635 02/26/25  1821   WBC Thousand/uL 7.83 7.26   HEMOGLOBIN g/dL 10.3* 11.5   HEMATOCRIT % 32.5* 36.9   PLATELETS Thousands/uL 264 297     Results from last 7 days   Lab Units 02/27/25  0635 02/26/25  1821   POTASSIUM mmol/L 4.4 4.2   CHLORIDE mmol/L 102 102   CO2  mmol/L 21 22   BUN mg/dL 17 15   CREATININE mg/dL 1.09 1.00   CALCIUM mg/dL 9.0 9.6     Results from last 7 days   Lab Units 02/27/25  0635   INR  1.04     Lab Results   Component Value Date    HGBA1C 7.7 (H) 02/26/2025     Lab Results   Component Value Date    TROPONINI <0.02 05/22/2019             VTE Prophylaxis: VTE covered by:    None   Pending Surgery

## 2025-02-27 NOTE — ASSESSMENT & PLAN NOTE
Lab Results   Component Value Date    EGFR 43 02/27/2025    EGFR 48 02/26/2025    EGFR 54 06/13/2024    CREATININE 1.09 02/27/2025    CREATININE 1.00 02/26/2025    CREATININE 0.91 06/13/2024

## 2025-02-27 NOTE — ANESTHESIA PREPROCEDURE EVALUATION
Procedure:  INSERTION NAIL IM FEMUR ANTEGRADE (TROCHANTERIC) (Left: Leg Upper)    Relevant Problems   CARDIO   (+) Benign essential hypertension   (+) Hyperlipidemia      ENDO   (+) Type 2 diabetes mellitus, with long-term current use of insulin (HCC)      GI/HEPATIC   (+) GERD (gastroesophageal reflux disease)      /RENAL   (+) CKD (chronic kidney disease) stage 3, GFR 30-59 ml/min (HCC)      GYN   (+) History of hysterectomy      NEURO/PSYCH   (+) Dementia (HCC)   (+) Depression      Orthopedic/Musculoskeletal   (+) Closed fracture of left hip (HCC)      Other   (+) Status post fall        Physical Exam    Airway    Mallampati score: II  TM Distance: >3 FB  Neck ROM: full     Dental   No notable dental hx     Cardiovascular  Cardiovascular exam normal    Pulmonary  Pulmonary exam normal     Other Findings  post-pubertal.      Anesthesia Plan  ASA Score- 3 Emergent    Anesthesia Type- general with ASA Monitors.         Additional Monitors:     Airway Plan: ETT.    Comment: ETT with TIVA; due to dementia patient's POA (her son, Miguel Angel Barclay) provides consent for procedure [phone].       Plan Factors-    Chart reviewed. EKG reviewed. Imaging results reviewed. Existing labs reviewed.     Patient is not a current smoker.              Induction- intravenous.    Postoperative Plan- Plan for postoperative opioid use.     Perioperative Resuscitation Plan -   The DNR status was discussed with the patient and/or POA, and Level 3 code status (DNR/DNI) will be maintained throughout the perioperative period.     Informed Consent- Anesthetic plan and risks discussed with son.  I personally reviewed this patient with the CRNA. Discussed and agreed on the Anesthesia Plan with the CRNA..      NPO Status:  No vitals data found for the desired time range.

## 2025-02-27 NOTE — ASSESSMENT & PLAN NOTE
Lab Results   Component Value Date    HGBA1C 7.7 (H) 02/26/2025       Recent Labs     02/27/25  0719   POCGLU 145*       On Lantus 15 units in a.m., hold for now as patient will be n.p.o.  Place patient on Accu-Cheks with SSI  Update A1c

## 2025-02-27 NOTE — CASE MANAGEMENT
Case Management Discharge Planning Note    Patient name Sabrina Barclay  Location [address] [address]/[address]-* MRN [id number]  : 3/25/1930 Date 2025       Current Admission Date: 2025  Current Admission Diagnosis:Closed fracture of left hip (HCC)   Patient Active Problem List    Diagnosis Date Noted Date Diagnosed    History of hysterectomy 2025     Closed fracture of left hip (HCC) 2025     GERD (gastroesophageal reflux disease) 06/10/2024     Constipation 06/10/2024     Dementia ([facility])      CKD (chronic kidney disease) stage 3, GFR 30-59 ml/min ([facility])      Wound of right breast 2024     Status post fall 2019     Hyponatremia 2019     Benign essential hypertension 2019     Type 2 diabetes mellitus, with long-term current use of insulin ([facility]) 2019     Depression 2019     Hyperlipidemia 2019       LOS (days): 1  Geometric Mean LOS (GMLOS) (days): 2.8  Days to GMLOS:2     OBJECTIVE:  Risk of Unplanned Readmission Score: 14.04         Current admission status: Inpatient   Preferred Pharmacy:   [email or web address] [address] [address]  Phone: [phone] Fax: [phone]    Primary Care Provider: No primary care provider on file.    Primary Insurance: MEDICARE  Secondary Insurance: [facility]    DISCHARGE DETAILS:    Discharge planning discussed with:: Ramesh Michelle  Freedom of Choice: Yes  Comments - Freedom of Choice: Callback received from ramesh Michelle. He is in agreement with plan for STR upon discharge and blanket referrals being sent. He confirmed accepting STR list can be emailed to him at [email or web address]  CM contacted family/caregiver?: Yes  Were Treatment Team discharge recommendations reviewed with patient/caregiver?: Yes  Did patient/caregiver verbalize understanding of patient care needs?: N/A- going to facility  Were patient/caregiver advised of the risks associated with not  following Treatment Team discharge recommendations?: Yes    Contacts  Patient Contacts: Miguel Angel Barclay (son)  Relationship to Patient:: Family  Contact Method: Phone  Phone Number: [phone]  Reason/Outcome: Emergency Contact, Continuity of Care, Discharge Planning

## 2025-02-27 NOTE — ASSESSMENT & PLAN NOTE
Patient presented from [facility] after a unwitnessed fall  CT head, CT cervical spine negative for acute pathology  X-ray hips shows left hip fracture but official read is pending  Received TXA in the ER after discussion with Ortho on-call  Keep n.p.o. after midnight for surgical intervention in a.m. per Ortho  Cardiology consulted for preop clearance  Repeat lab work in a.m.  PT/OT once cleared by Ortho

## 2025-02-28 PROBLEM — D62 ACUTE BLOOD LOSS ANEMIA: Status: ACTIVE | Noted: 2025-02-28

## 2025-02-28 LAB
ANION GAP SERPL CALCULATED.3IONS-SCNC: 8 MMOL/L (ref 4–13)
BASOPHILS # BLD AUTO: 0.02 THOUSANDS/ÂΜL (ref 0–0.1)
BASOPHILS NFR BLD AUTO: 0 % (ref 0–1)
BUN SERPL-MCNC: 17 MG/DL (ref 5–25)
CALCIUM SERPL-MCNC: 8.1 MG/DL (ref 8.4–10.2)
CHLORIDE SERPL-SCNC: 103 MMOL/L (ref 96–108)
CO2 SERPL-SCNC: 22 MMOL/L (ref 21–32)
CREAT SERPL-MCNC: 1 MG/DL (ref 0.6–1.3)
EOSINOPHIL # BLD AUTO: 0.05 THOUSAND/ÂΜL (ref 0–0.61)
EOSINOPHIL NFR BLD AUTO: 1 % (ref 0–6)
ERYTHROCYTE [DISTWIDTH] IN BLOOD BY AUTOMATED COUNT: 13.8 % (ref 11.6–15.1)
GFR SERPL CREATININE-BSD FRML MDRD: 48 ML/MIN/1.73SQ M
GLUCOSE SERPL-MCNC: 166 MG/DL (ref 65–140)
GLUCOSE SERPL-MCNC: 198 MG/DL (ref 65–140)
GLUCOSE SERPL-MCNC: 216 MG/DL (ref 65–140)
GLUCOSE SERPL-MCNC: 241 MG/DL (ref 65–140)
HCT VFR BLD AUTO: 26 % (ref 34.8–46.1)
HGB BLD-MCNC: 8.1 G/DL (ref 11.5–15.4)
IMM GRANULOCYTES # BLD AUTO: 0.03 THOUSAND/UL (ref 0–0.2)
IMM GRANULOCYTES NFR BLD AUTO: 0 % (ref 0–2)
LYMPHOCYTES # BLD AUTO: 1.68 THOUSANDS/ÂΜL (ref 0.6–4.47)
LYMPHOCYTES NFR BLD AUTO: 23 % (ref 14–44)
MAGNESIUM SERPL-MCNC: 1.6 MG/DL (ref 1.9–2.7)
MCH RBC QN AUTO: 29 PG (ref 26.8–34.3)
MCHC RBC AUTO-ENTMCNC: 31.2 G/DL (ref 31.4–37.4)
MCV RBC AUTO: 93 FL (ref 82–98)
MONOCYTES # BLD AUTO: 0.71 THOUSAND/ÂΜL (ref 0.17–1.22)
MONOCYTES NFR BLD AUTO: 10 % (ref 4–12)
MRSA NOSE QL CULT: NORMAL
NEUTROPHILS # BLD AUTO: 4.77 THOUSANDS/ÂΜL (ref 1.85–7.62)
NEUTS SEG NFR BLD AUTO: 66 % (ref 43–75)
NRBC BLD AUTO-RTO: 0 /100 WBCS
PLATELET # BLD AUTO: 224 THOUSANDS/UL (ref 149–390)
PMV BLD AUTO: 9.9 FL (ref 8.9–12.7)
POTASSIUM SERPL-SCNC: 4.2 MMOL/L (ref 3.5–5.3)
QRS AXIS: 10 DEGREES
QRSD INTERVAL: 84 MS
QT INTERVAL: 398 MS
QTC INTERVAL: 436 MS
RBC # BLD AUTO: 2.79 MILLION/UL (ref 3.81–5.12)
SODIUM SERPL-SCNC: 133 MMOL/L (ref 135–147)
T WAVE AXIS: 100 DEGREES
VENTRICULAR RATE: 72 BPM
WBC # BLD AUTO: 7.26 THOUSAND/UL (ref 4.31–10.16)

## 2025-02-28 PROCEDURE — 97167 OT EVAL HIGH COMPLEX 60 MIN: CPT

## 2025-02-28 PROCEDURE — 83735 ASSAY OF MAGNESIUM: CPT | Performed by: FAMILY MEDICINE

## 2025-02-28 PROCEDURE — 99024 POSTOP FOLLOW-UP VISIT: CPT | Performed by: PHYSICIAN ASSISTANT

## 2025-02-28 PROCEDURE — 97110 THERAPEUTIC EXERCISES: CPT

## 2025-02-28 PROCEDURE — 82948 REAGENT STRIP/BLOOD GLUCOSE: CPT

## 2025-02-28 PROCEDURE — 85025 COMPLETE CBC W/AUTO DIFF WBC: CPT | Performed by: FAMILY MEDICINE

## 2025-02-28 PROCEDURE — 99232 SBSQ HOSP IP/OBS MODERATE 35: CPT | Performed by: FAMILY MEDICINE

## 2025-02-28 PROCEDURE — 97163 PT EVAL HIGH COMPLEX 45 MIN: CPT

## 2025-02-28 PROCEDURE — 80048 BASIC METABOLIC PNL TOTAL CA: CPT | Performed by: FAMILY MEDICINE

## 2025-02-28 PROCEDURE — 99232 SBSQ HOSP IP/OBS MODERATE 35: CPT | Performed by: INTERNAL MEDICINE

## 2025-02-28 PROCEDURE — 93010 ELECTROCARDIOGRAM REPORT: CPT | Performed by: INTERNAL MEDICINE

## 2025-02-28 RX ORDER — MAGNESIUM SULFATE HEPTAHYDRATE 40 MG/ML
2 INJECTION, SOLUTION INTRAVENOUS ONCE
Status: COMPLETED | OUTPATIENT
Start: 2025-02-28 | End: 2025-02-28

## 2025-02-28 RX ADMIN — Medication 400 MG: at 09:07

## 2025-02-28 RX ADMIN — Medication 250 MG: at 09:08

## 2025-02-28 RX ADMIN — ACETAMINOPHEN 975 MG: 325 TABLET ORAL at 17:43

## 2025-02-28 RX ADMIN — PANTOPRAZOLE SODIUM 40 MG: 40 TABLET, DELAYED RELEASE ORAL at 09:08

## 2025-02-28 RX ADMIN — OXYCODONE HYDROCHLORIDE 5 MG: 5 TABLET ORAL at 09:07

## 2025-02-28 RX ADMIN — Medication 1000 UNITS: at 09:08

## 2025-02-28 RX ADMIN — MELATONIN TAB 3 MG 4.5 MG: 3 TAB at 21:28

## 2025-02-28 RX ADMIN — MAGNESIUM SULFATE HEPTAHYDRATE 2 G: 40 INJECTION, SOLUTION INTRAVENOUS at 10:20

## 2025-02-28 RX ADMIN — MEMANTINE 5 MG: 5 TABLET ORAL at 17:43

## 2025-02-28 RX ADMIN — LOSARTAN POTASSIUM 50 MG: 50 TABLET, FILM COATED ORAL at 09:07

## 2025-02-28 RX ADMIN — DONEPEZIL HYDROCHLORIDE 5 MG: 5 TABLET ORAL at 21:28

## 2025-02-28 RX ADMIN — ASPIRIN 81 MG CHEWABLE TABLET 81 MG: 81 TABLET CHEWABLE at 09:08

## 2025-02-28 RX ADMIN — ACETAMINOPHEN 975 MG: 325 TABLET ORAL at 06:30

## 2025-02-28 RX ADMIN — MEMANTINE 5 MG: 5 TABLET ORAL at 09:08

## 2025-02-28 RX ADMIN — MIRTAZAPINE 15 MG: 15 TABLET, FILM COATED ORAL at 21:28

## 2025-02-28 RX ADMIN — Medication 250 MG: at 17:43

## 2025-02-28 RX ADMIN — CEFAZOLIN SODIUM 1000 MG: 1 SOLUTION INTRAVENOUS at 09:06

## 2025-02-28 RX ADMIN — INSULIN LISPRO 1 UNITS: 100 INJECTION, SOLUTION INTRAVENOUS; SUBCUTANEOUS at 17:43

## 2025-02-28 RX ADMIN — ASPIRIN 81 MG CHEWABLE TABLET 81 MG: 81 TABLET CHEWABLE at 21:28

## 2025-02-28 RX ADMIN — INSULIN LISPRO 2 UNITS: 100 INJECTION, SOLUTION INTRAVENOUS; SUBCUTANEOUS at 07:30

## 2025-02-28 RX ADMIN — INSULIN LISPRO 1 UNITS: 100 INJECTION, SOLUTION INTRAVENOUS; SUBCUTANEOUS at 11:55

## 2025-02-28 RX ADMIN — PAROXETINE 40 MG: 20 TABLET, FILM COATED ORAL at 09:08

## 2025-02-28 NOTE — PROGRESS NOTES
Progress Note - Orthopedics   Name: Sabrina Barclay 89 y.o. female I MRN: [id number]  Unit/Bed#: [address] [place] [address] Date of Admission: 2/26/2025   Date of Service: 2/28/2025 I Hospital Day: 2    Assessment & Plan  Closed fracture of left hip (HCC)  POD#1 s/p short IMN left hip  Ancef 1g IV x 2 for 24 hours postop  DVT prophylaxis: Asa 81mg BID/SCD's/Ambulation  PT/OT- WBAT LLE  Analgesia PRN - mabel protocol  Follow up AM labs - 2g drop in hgb, continue to monitor and replete with IV fluids.  No sign of hematoma or drainage on the dressings  Post op medical management per primary team  Dressing- monitor for drainage, Mepilex dsgs may remain in place 7 days  Discharge planning -ongoing    Benign essential hypertension    Type 2 diabetes mellitus, with long-term current use of insulin ([facility])  Lab Results   Component Value Date    HGBA1C 7.7 (H) 02/26/2025       Recent Labs     02/27/25  0719 02/27/25  1133 02/27/25  2042   POCGLU 145* 151* 225*       Blood Sugar Average: Last 72 hrs:  (P) 173.0149176246361585    Depression    Dementia ([facility])    CKD (chronic kidney disease) stage 3, GFR 30-59 ml/min ([facility])  Lab Results   Component Value Date    EGFR 48 02/28/2025    EGFR 43 02/27/2025    EGFR 48 02/26/2025    CREATININE 1.00 02/28/2025    CREATININE 1.09 02/27/2025    CREATININE 1.00 02/26/2025     History of hysterectomy      Medical co-morbidities include type 2 diabetes, hypertension, dementia, and chronic kidney disease which are being managed per primary team.     Orthopedics service will follow.    Subjective   89 y.o.female POD#1 s/p left short cephomedullary nail. No acute events, no new complaints. Pain well controlled. Denies fevers, chills, CP, SOB, N/V, numbness or tingling. Patient reports no issues with urination. Patient states she does not recall having surgery    Objective :  Temp:  [98.1 °F (36.7 °C)-98.9 °F (37.2 °C)] 98.4 °F (36.9 °C)  HR:  [69-97] 86  BP: (100-152)/(50-87) 104/50  Resp:  [18-20] 19  SpO2:  [89  %-100 %] 94 %  O2 Device: None (Room air)  Nasal Cannula O2 Flow Rate (L/min):  [2 L/min] 2 L/min    Physical Exam  Vitals and nursing note reviewed.   Constitutional:       Appearance: She is well-developed.      Comments: Body mass index is 25.4 kg/m².   HENT:      Head: Normocephalic and atraumatic.      Right Ear: External ear normal.      Left Ear: External ear normal.   Eyes:      Extraocular Movements: Extraocular movements intact.      Conjunctiva/sclera: Conjunctivae normal.   Cardiovascular:      Rate and Rhythm: Normal rate.      Pulses: Normal pulses.   Pulmonary:      Effort: Pulmonary effort is normal.   Abdominal:      Palpations: Abdomen is soft.   Musculoskeletal:      Cervical back: Normal range of motion.      Comments: See ortho exam   Skin:     General: Skin is warm and dry.   Neurological:      General: No focal deficit present.      Mental Status: She is alert. Mental status is at baseline.   Psychiatric:         Mood and Affect: Mood normal.         Behavior: Behavior normal.         Thought Content: Thought content normal.         Judgment: Judgment normal.       Musculoskeletal: Left hip  Skin intact . No erythema or ecchymosis. No sign of hematoma  Dressing 3 lateral hip dsgs c/d/i  Motor intact to +FHL/EHL, +ankle dorsi/plantar flexion  Sensation intact to saphenous, sural, tibial, superficial peroneal nerve, and deep peroneal  2+ DP pulse  No calf swelling or tenderness to palpation      Lab Results: I have reviewed the following results:  Lab Results   Component Value Date    WBC 7.26 02/28/2025    HGB 8.1 (L) 02/28/2025    HCT 26.0 (L) 02/28/2025    MCV 93 02/28/2025     02/28/2025     Lab Results   Component Value Date    SODIUM 133 (L) 02/28/2025    K 4.2 02/28/2025     02/28/2025    CO2 22 02/28/2025    AGAP 8 02/28/2025    BUN 17 02/28/2025    CREATININE 1.00 02/28/2025    GLUC 166 (H) 02/28/2025    GLUF 120 (H) 05/22/2019    CALCIUM 8.1 (L) 02/28/2025    AST 13  02/26/2025    ALT 8 02/26/2025    ALKPHOS 87 02/26/2025    TP 6.8 02/26/2025    TBILI 0.28 02/26/2025    EGFR 48 02/28/2025

## 2025-02-28 NOTE — PHYSICAL THERAPY NOTE
"       PHYSICAL THERAPY EVALUATION/TREATMENT     02/28/25 0950   PT Last Visit   PT Visit Date 02/28/25   Note Type   Note type Evaluation   Pain Assessment   Pain Assessment Tool FLACC   Pain Rating: FLACC (Rest) - Face 0   Pain Rating: FLACC (Rest) - Legs 0   Pain Rating: FLACC (Rest) - Activity 0   Pain Rating: FLACC (Rest) - Cry 0   Pain Rating: FLACC (Rest) - Consolability 0   Score: FLACC (Rest) 0   Pain Rating: FLACC (Activity) - Face 1   Pain Rating: FLACC (Activity) - Legs 1   Pain Rating: FLACC (Activity) - Activity 1   Pain Rating: FLACC (Activity) - Cry 1   Pain Rating: FLACC (Activity) - Consolability 1   Score: FLACC (Activity) 5   Restrictions/Precautions   Weight Bearing Precautions Per Order Yes   LLE Weight Bearing Per Order WBAT   Other Precautions Pain;Fall Risk;Bed Alarm;Chair Alarm;Cognitive   Home Living   Type of Home Assisted living  ([facility])   Home Layout One level   Home Equipment Walker;Wheelchair-manual   Prior Function   Level of [place] Needs assistance with ADLs;Needs assistance with functional mobility  (Pt uses a WC for mobility.  Pt reports she also walks with a walker.)   Lives With Facility staff   Receives Help From Personal care attendant   Falls in the last 6 months 1 to 4   Vocational Retired   General   Additional Pertinent History Pt admitted s/p fall.  Pt is POD 1 s/p IM nail for L hip fx.   Cognition   Overall Cognitive Status Impaired   Arousal/Participation Cooperative   Orientation Level Oriented to person;Oriented to place;Disoriented to time;Disoriented to situation   Following Commands Follows one step commands without difficulty   Subjective   Subjective \"I am OK\"   RLE Assessment   RLE Assessment   (ROM WFL, MMT 4/5)   LLE Assessment   LLE Assessment   (PROM limited by pain but WFL, MMT hip 2-/5, knee 2+/5, ankle 3/5)   Bed Mobility   Supine to Sit 2  Maximal assistance   Transfers   Sit to Stand 2  Maximal assistance   Additional items Assist x 2   Stand " to Sit 2  Maximal assistance   Additional items Assist x 2   Stand pivot 2  Maximal assistance   Additional items Assist x 2  (with walker)   Balance   Static Sitting Fair   Static Standing Poor   Activity Tolerance   Activity Tolerance Patient limited by pain;Patient limited by fatigue   Nurse Made Aware Shanti BENITO aware pt is OOB with max assist x 2.   Assessment   Prognosis Good   Problem List Decreased strength;Decreased range of motion;Impaired balance;Decreased mobility;Pain;Decreased cognition   Assessment Pt is 89 y.o. female seen for PT evaluation s/p admit to [facility] on 2/26/2025 w/ Closed fracture of left hip (HCC). PT consulted to assess pt's functional mobility and d/c needs. Order placed for PT eval and tx, w/ WBAT L LE order.  Co-morbidities affecting patient's physical performance include: dementia, depression, DM, fall.  Personal factors affecting patient at time of initial evaluation include: ambulating with assistive device, inability to ambulate household distances, decreased cognition, and positive fall history.         Prior to admission, patient was requiring assist with ADLs and ambulation.  Upon evaluation: Pt transferred bed to chair with a walker with max assist x 2.          Please find objective findings from Physical Therapy assessment regarding body systems outlined above with impairments and limitations including weakness, impaired balance, gait deviations, pain, decreased activity tolerance, decreased functional mobility tolerance, and fall risk.The Barthel Index was used as a functional outcome tool presenting with a score of Barthel Index Score: 25 today indicating marked limitations of functional mobility and ADLS.  Patient's clinical presentation is currently unstable/unpredictable as seen in patient's presentation of changing level of pain, varying levels of cognitive performance, increased fall risk, new onset of impairment of functional mobility, and new onset of  weakness. Pt would benefit from continued Physical Therapy treatment to address deficits as defined above and maximize level of functional mobility.     As demonstrated by objective findings, the assigned level of complexity for this evaluation is high.    The patient's -[facility] Basic Mobility Inpatient Short Form Raw Score is 8. A Raw score of less than or equal to 16 suggests the patient may benefit from discharge to post-acute rehabilitation services. Please also refer to the recommendation of the Physical Therapist for safe discharge planning.   Goals   Patient Goals none stated when asked   STG Expiration Date 03/07/25   Short Term Goal #1 1. improve bed mobility to mod assist,   2. improve transfers to mod assist with a walker,   3. Pt will ambulate x 15 feet with mod assist and a walker   LTG Expiration Date 03/14/25   Long Term Goal #1 1. no falls,   2. min assist bed mobility,   3. min assist transfers with a walker,   4. Improve L LE strength to at least 3+/5   Plan   Treatment/Interventions Therapeutic exercise;Elevations;LE strengthening/ROM;Functional transfer training;ADL retraining;Gait training;Bed mobility;Equipment eval/education;Patient/family training;Spoke to nursing   PT Frequency   (6x/week)   Discharge Recommendation   Rehab Resource Intensity Level, PT II (Moderate Resource Intensity)   AM-PAC Basic Mobility Inpatient   Turning in Flat Bed Without Bedrails 2   Lying on Back to Sitting on Edge of Flat Bed Without Bedrails 2   Moving Bed to Chair 1   Standing Up From Chair Using Arms 1   Walk in Room 1   Climb 3-5 Stairs With Railing 1   Basic Mobility Inpatient Raw Score 8   Turning Head Towards Sound 3   Follow Simple Instructions 3   Low Function Basic Mobility Raw Score  14   Low Function Basic Mobility Standardized Score  22.01   [facility] Highest Level Of Mobility   -HLM Goal 3: Sit at edge of bed   -HL Achieved 4: Move to chair/commode   Barthel Index   Feeding 5   Bathing 0  "  Grooming Score 0   Dressing Score 5   Bladder Score 5   Bowels Score 10   Toilet Use Score 5   Transfers (Bed/Chair) Score 5   Mobility (Level Surface) Score 0   Stairs Score 0   Barthel Index Score 35   Additional Treatment Session   Start Time 0940   End Time 0950   Treatment Assessment S:  \"OK\"   O:  x 10 each ankle pumps, quad set, hip IR/ER AAROM, AAROM hip abd/add L LE.  x 10 each B LE LAQ.   A: Pt tolerated initial activity fairly well POD 1 s/p L hip IMN for fx.   P: Encourage OOB with walker, progress transfers and ambulation as able.    End of Consult   Patient Position at End of Consult All needs within reach;Bed/Chair alarm activated;Bedside chair   Licensure   [place] License Number  Judi Santos PT  [id number]     "

## 2025-02-28 NOTE — ASSESSMENT & PLAN NOTE
Patient presented from [facility] after a unwitnessed fall  CT head, CT cervical spine negative for acute pathology  X-ray hips shows left hip fracture but official read is pending  Received TXA in the ER after discussion with Ortho on-call  Keep n.p.o. after midnight for surgical intervention in a.m. per Ortho  Cardiology consulted for preop clearance  Repeat lab work in a.m.  PT/OT once cleared by Ortho  2/27 - Plan for OR this evening with orthopedic surgery. Pain currently well controlled. 2D echo ordered by cardiology. Follow up results.  2/28 - Patient is s/p short IMN left hip yesterday with orthopedic surgery.  Follow-up with further recommendations from orthopedic surgery.  Physical therapy and Occupational Therapy consultations have been placed.

## 2025-02-28 NOTE — ASSESSMENT & PLAN NOTE
Lab Results   Component Value Date    HGBA1C 7.7 (H) 02/26/2025       Recent Labs     02/27/25  0719 02/27/25  1133 02/27/25 2042 02/28/25  1124   POCGLU 145* 151* 225* 216*       On Lantus 15 units in a.m., hold for now until PO intake improved  Place patient on Accu-Cheks with SSI  Update A1c

## 2025-02-28 NOTE — ASSESSMENT & PLAN NOTE
Lab Results   Component Value Date    HGBA1C 7.7 (H) 02/26/2025       Recent Labs     02/27/25  0719 02/27/25  1133 02/27/25 2042   POCGLU 145* 151* 225*       Blood Sugar Average: Last 72 hrs:  (P) [id number]

## 2025-02-28 NOTE — ASSESSMENT & PLAN NOTE
Lab Results   Component Value Date    EGFR 48 02/28/2025    EGFR 43 02/27/2025    EGFR 48 02/26/2025    CREATININE 1.00 02/28/2025    CREATININE 1.09 02/27/2025    CREATININE 1.00 02/26/2025

## 2025-02-28 NOTE — ASSESSMENT & PLAN NOTE
Hemoglobin 8.1 this morning compared to 10.3 yesterday.  Likely secondary to acute blood loss anemia secondary to orthopedic surgery.  Will continue to monitor.

## 2025-02-28 NOTE — OCCUPATIONAL THERAPY NOTE
OT EVALUATION       02/28/25 1000   OT Last Visit   OT Visit Date 02/28/25   Note Type   Note type Evaluation   Pain Assessment   Pain Assessment Tool FLACC   Pain Location/Orientation Orientation: Left;Location: Hip  (with activity)   Pain Rating: FLACC (Rest) - Face 0   Pain Rating: FLACC (Rest) - Legs 0   Pain Rating: FLACC (Rest) - Activity 0   Pain Rating: FLACC (Rest) - Cry 0   Pain Rating: FLACC (Rest) - Consolability 0   Score: FLACC (Rest) 0   Pain Rating: FLACC (Activity) - Face 1   Pain Rating: FLACC (Activity) - Legs 1   Pain Rating: FLACC (Activity) - Activity 1   Pain Rating: FLACC (Activity) - Cry 1   Pain Rating: FLACC (Activity) - Consolability 1   Score: FLACC (Activity) 5   Restrictions/Precautions   LLE Weight Bearing Per Order WBAT   Other Precautions Chair Alarm;Bed Alarm;Fall Risk;Pain   Home Living   Type of Home Assisted living  ([facility])   Additional Comments Patient s/p IM nail L femur 2/27/25   Prior Function   Level of [place] Needs assistance with ADLs;Needs assistance with IADLS   Lives With Facility staff   Receives Help From Personal care attendant   IADLs Family/Friend/Other provides transportation;Family/Friend/Other provides medication management;Family/Friend/Other provides meals   Falls in the last 6 months 1 to 4   ADL   Eating Assistance 4  Minimal Assistance   Grooming Assistance 4  Minimal Assistance   UB Bathing Assistance 3  Moderate Assistance   LB Bathing Assistance 2  Maximal Assistance   UB Dressing Assistance 3  Moderate Assistance   LB Dressing Assistance 2  Maximal Assistance   Toileting Assistance  2  Maximal Assistance   Transfers   Sit to Stand 2  Maximal assistance   Additional items Assist x 2;Verbal cues   Stand to Sit 2  Maximal assistance   Additional items Assist x 2;Verbal cues   Stand pivot 2  Maximal assistance   Additional items Assist x 2;Verbal cues  (bed to chair with RW)   Balance   Static Sitting Fair   Dynamic Sitting Fair -   Static  Standing Poor -   Dynamic Standing Poor -   Activity Tolerance   Activity Tolerance Patient limited by fatigue;Patient limited by pain   RUE Assessment   RUE Assessment   (shoulder flexion 50 degrees AROM, elbow/ WFL)   LUE Assessment   LUE Assessment   (AROM shoulder flexion 20 degrees, elbow/ WFL)   Cognition   Overall Cognitive Status Impaired   Arousal/Participation Cooperative   Attention Attends with cues to redirect   Orientation Level Oriented to person;Oriented to place;Disoriented to time;Disoriented to situation   Following Commands Follows one step commands with increased time or repetition   Assessment   Limitation Decreased ADL status;Decreased UE strength;Decreased Safe judgement during ADL;Decreased endurance;Decreased UE ROM;Decreased cognition;Decreased high-level ADLs;Decreased self-care trans  (decreased balance and mobility)   Prognosis Good   Assessment Patient evaluated by Occupational Therapy.  Patient admitted with Closed fracture of left hip (HCC).  The patients occupational profile, medical and therapy history includes a extensive additional review of physical, cognitive, or psychosocial history related to current functional performance.  Comorbidities affecting functional mobility and ADLS include: anxiety, arthritis, dementia, depression, diabetes, hypertension, and macular degeneration.  Prior to admission, patient was requiring assist for ADLS, requiring assist for IADLS, and an assisted living resident.  The evaluation identifies the following performance deficits: weakness, decreased ROM, impaired balance, decreased endurance, increased fall risk, new onset of impairment of functional mobility, decreased ADLS, decreased IADLS, pain, decreased activity tolerance, decreased safety awareness, impaired judgement, decreased cognition, and decreased strength, that result in activity limitations and/or participation restrictions. This evaluation requires clinical decision making of  high complexity, because the patient presents with comorbidites that affect occupational performance and required significant modification of tasks or assistance with consideration of multiple treatment options.  The Barthel Index was used as a functional outcome tool presenting with a score of Barthel Index Score: 25, indicating marked limitations of functional mobility and ADLS.  The patient's raw score on the -PAC Daily Activity Inpatient Short Form is 12. A raw score of less than 19 suggests the patient may benefit from discharge to post-acute rehabilitation services. Please refer to the recommendation of the Occupational Therapist for safe discharge planning.  Patient will benefit from skilled Occupational Therapy services to address above deficits and facilitate a safe return to prior level of function.   Goals   Patient Goals unable to state due to cognitive impairment   STG Time Frame   (1-7 days)   Short Term Goal  Goals established to promote Patient Goals: unable to state due to cognitive impairment:  Eating: supervision; Grooming: supervision seated; Bathing: mod assist; Upper Body Dressing mod assist; Lower Body Dressing: mod assist; Toileting: mod assist; Patient will increase stand pivot commode transfer to mod assist of 2 with rolling walker to increase performance and safety with ADLS and functional mobility; Patient will increase standing tolerance to 2 minutes during ADL task to decrease assistance level and decrease fall risk; Patient will increase bed mobility to mod assist in preparation for ADLS and transfers; Patient will increase functional mobility to and from bathroom with rolling walker with mod assist of 2 to increase performance with ADLS and to use a toilet; Patient will tolerate 5 minutes of UE ROM/strengthening to increase general activity tolerance and performance in ADLS/IADLS; Patient will improve functional activity tolerance to 10 minutes of sustained functional tasks to  increase participation in basic self-care and decrease assistance level; Patient will increase dynamic sitting balance to fair to improve the ability to sit at edge of bed or on a chair for ADLS;  Patient will increase dynamic standing balance to poor to improve postural stability and decrease fall risk during standing ADLS and transfers.   LTG Time Frame   (8-14 days)   Long Term Goal Eating: independent; Grooming: independent seated; Bathing: min assist; Upper Body Dressing min assist; Lower Body Dressing: min assist; Toileting: min assist; Patient will increase stand pivot commode transfer to mod assist with rolling walker to increase performance and safety with ADLS and functional mobility; Patient will increase standing tolerance to 4 minutes during ADL task to decrease assistance level and decrease fall risk; Patient will increase bed mobility to min assist in preparation for ADLS and transfers; Patient will increase functional mobility to and from bathroom with rolling walker with mod assist to increase performance with ADLS and to use a toilet; Patient will tolerate 10 minutes of UE ROM/strengthening to increase general activity tolerance and performance in ADLS/IADLS; Patient will improve functional activity tolerance to 20 minutes of sustained functional tasks to increase participation in basic self-care and decrease assistance level; Patient will increase dynamic sitting balance to fair+ to improve the ability to sit at edge of bed or on a chair for ADLS;  Patient will increase dynamic standing balance to poor+ to improve postural stability and decrease fall risk during standing ADLS and transfers.   Pt will score >/= 16/24 on AM-PAC Daily Activity Inpatient scale to promote safe independence with ADLs and functional mobility; Pt will score >/= 55/100 on Barthel Index in order to decrease caregiver assistance needed and increase ability to perform ADLs and functional mobility.   Plan   Treatment  Interventions ADL retraining;Functional transfer training;UE strengthening/ROM;Endurance training;Cognitive reorientation;Equipment evaluation/education;Patient/family training;Activityengagement;Compensatory technique education   Goal Expiration Date 03/14/25   OT Frequency 3-5x/wk   Discharge Recommendation   Rehab Resource Intensity Level, OT II (Moderate Resource Intensity)   AM-PAC Daily Activity Inpatient   Lower Body Dressing 1   Bathing 2   Toileting 1   Upper Body Dressing 2   Grooming 3   Eating 3   Daily Activity Raw Score 12   Daily Activity Standardized Score (Calc for Raw Score >=11) 30.6   Barthel Index   Feeding 5   Bathing 0   Grooming Score 0   Dressing Score 0   Bladder Score 5   Bowels Score 5   Toilet Use Score 5   Transfers (Bed/Chair) Score 5   Mobility (Level Surface) Score 0   Stairs Score 0   Barthel Index Score 25   Licensure   [place] License Number  Giovana Wise MS OTR/L [id number]

## 2025-02-28 NOTE — PROGRESS NOTES
Progress Note - Hospitalist   Name: Sabrina Barclay 89 y.o. female I MRN: [id number]  Unit/Bed#: 2 [place] [address] I Date of Admission: 2/26/2025   Date of Service: 2/28/2025 I Hospital Day: 2     Assessment & Plan  Closed fracture of left hip (HCC)  Patient presented from [facility] after a unwitnessed fall  CT head, CT cervical spine negative for acute pathology  X-ray hips shows left hip fracture but official read is pending  Received TXA in the ER after discussion with Ortho on-call  Keep n.p.o. after midnight for surgical intervention in a.m. per Ortho  Cardiology consulted for preop clearance  Repeat lab work in a.m.  PT/OT once cleared by Ortho  2/27 - Plan for OR this evening with orthopedic surgery. Pain currently well controlled. 2D echo ordered by cardiology. Follow up results.  2/28 - Patient is s/p short IMN left hip yesterday with orthopedic surgery.  Follow-up with further recommendations from orthopedic surgery.  Physical therapy and Occupational Therapy consultations have been placed.  Benign essential hypertension  Continue Cozaar and monitor blood pressures  Type 2 diabetes mellitus, with long-term current use of insulin ([facility])  Lab Results   Component Value Date    HGBA1C 7.7 (H) 02/26/2025       Recent Labs     02/27/25  0719 02/27/25  1133 02/27/25  2042 02/28/25  1124   POCGLU 145* 151* 225* 216*       On Lantus 15 units in a.m., hold for now until PO intake improved  Place patient on Accu-Cheks with SSI  Update A1c    Dementia ([facility])  Continue Namenda Aricept  CKD (chronic kidney disease) stage 3, GFR 30-59 ml/min ([facility])  Lab Results   Component Value Date    EGFR 48 02/28/2025    EGFR 43 02/27/2025    EGFR 48 02/26/2025    CREATININE 1.00 02/28/2025    CREATININE 1.09 02/27/2025    CREATININE 1.00 02/26/2025     Creatinine 1.00. Will continue to monitor.  Depression  Continue Paxil, Remeron  History of hysterectomy    Acute blood loss anemia  Hemoglobin 8.1 this morning compared to 10.3 yesterday.   Likely secondary to acute blood loss anemia secondary to orthopedic surgery.  Will continue to monitor.  VTE Pharmacologic Prophylaxis: VTE Score: 11 Aspirin 81mg twice daily.    Mobility:   Basic Mobility Inpatient Raw Score: 12  -HLM Goal: 4: Move to chair/commode  JH-HLM Achieved: 4: Move to chair/commode    Patient Centered Rounds: I performed bedside rounds with nursing staff today.     Education and Discussions with Family / Patient: Attempted to call patient's son, Miguel Angel. No answer. Voicemail was left.    Current Length of Stay: 2 day(s)  Current Patient Status: Inpatient   Certification Statement: The patient will continue to require additional inpatient hospital stay due to above.  Discharge Plan: TBD. Likely rehab.    Code Status: Level 3 - DNAR and DNI    Subjective   Patient was seen and examined at bedside.  No acute events overnight.  No new complaints.    Objective :  Temp:  [98.1 °F (36.7 °C)-98.9 °F (37.2 °C)] 98.4 °F (36.9 °C)  HR:  [81-97] 86  BP: (100-138)/(50-87) 104/50  Resp:  [18-20] 19  SpO2:  [89 %-100 %] 94 %  O2 Device: None (Room air)  Nasal Cannula O2 Flow Rate (L/min):  [2 L/min] 2 L/min    Body mass index is 25.4 kg/m².     Input and Output Summary (last 24 hours):     Intake/Output Summary (Last 24 hours) at 2/28/2025 1214  Last data filed at 2/27/2025 1818  Gross per 24 hour   Intake 1100 ml   Output --   Net 1100 ml     Physical Exam  HENT:      Head: Normocephalic.      Mouth/Throat:      Mouth: Mucous membranes are moist.   Eyes:      Extraocular Movements: Extraocular movements intact.   Cardiovascular:      Rate and Rhythm: Normal rate.   Pulmonary:      Effort: Pulmonary effort is normal. No respiratory distress.   Abdominal:      Palpations: Abdomen is soft.      Tenderness: There is no abdominal tenderness.   Skin:     General: Skin is warm.   Neurological:      Mental Status: She is alert and oriented to person, place, and time.   Psychiatric:         Mood and Affect: Mood  normal.       Lines/Drains:  Lines/Drains/Airways       Active Status       Name Placement date Placement time Site Days    External Urinary Catheter 02/27/25  0600  -- 1                    Lab Results: I have reviewed the following results:   Results from last 7 days   Lab Units 02/28/25  0631   WBC Thousand/uL 7.26   HEMOGLOBIN g/dL 8.1*   HEMATOCRIT % 26.0*   PLATELETS Thousands/uL 224   SEGS PCT % 66   LYMPHO PCT % 23   MONO PCT % 10   EOS PCT % 1     Results from last 7 days   Lab Units 02/28/25  0631 02/27/25  0635 02/26/25  1821   SODIUM mmol/L 133*   < > 134*   POTASSIUM mmol/L 4.2   < > 4.2   CHLORIDE mmol/L 103   < > 102   CO2 mmol/L 22   < > 22   BUN mg/dL 17   < > 15   CREATININE mg/dL 1.00   < > 1.00   ANION GAP mmol/L 8   < > 10   CALCIUM mg/dL 8.1*   < > 9.6   ALBUMIN g/dL  --   --  3.9   TOTAL BILIRUBIN mg/dL  --   --  0.28   ALK PHOS U/L  --   --  87   ALT U/L  --   --  8   AST U/L  --   --  13   GLUCOSE RANDOM mg/dL 166*   < > 128    < > = values in this interval not displayed.     Results from last 7 days   Lab Units 02/27/25  0635   INR  1.04     Results from last 7 days   Lab Units 02/28/25  1124 02/27/25  2042 02/27/25  1133 02/27/25  0719   POC GLUCOSE mg/dl 216* 225* 151* 145*     Results from last 7 days   Lab Units 02/26/25  1821   HEMOGLOBIN A1C % 7.7*     Last 24 Hours Medication List:     Current Facility-Administered Medications:     acetaminophen (TYLENOL) tablet 975 mg, Q8H JAI    aspirin chewable tablet 81 mg, BID    Cholecalciferol (VITAMIN D3) tablet 1,000 Units, Daily    donepezil (ARICEPT) tablet 5 mg, HS    [Held by provider] insulin glargine (LANTUS) subcutaneous injection 15 Units 0.15 mL, QAM    insulin lispro (HumALOG/ADMELOG) 100 units/mL subcutaneous injection 1-5 Units, TID AC **AND** Fingerstick Glucose (POCT), TID AC    losartan (COZAAR) tablet 50 mg, Daily    magnesium Oxide (MAG-OX) tablet 400 mg, Daily    magnesium sulfate 2 g/50 mL IVPB (premix) 2 g, Once, Last  Rate: 2 g (02/28/25 1020)    melatonin tablet 4.5 mg, HS    memantine (NAMENDA) tablet 5 mg, BID    mirtazapine (REMERON) tablet 15 mg, HS    oxyCODONE (ROXICODONE) IR tablet 5 mg, Q6H PRN    oxyCODONE (ROXICODONE) split tablet 2.5 mg, Q6H PRN    pantoprazole (PROTONIX) EC tablet 40 mg, Daily    PARoxetine (PAXIL) tablet 40 mg, QAM    saccharomyces boulardii (FLORASTOR) capsule 250 mg, BID    Administrative Statements   Today, Patient Was Seen By: Gildardo Gunderson MD    **Please Note: This note may have been constructed using a voice recognition system.**

## 2025-02-28 NOTE — OP NOTE
OPERATIVE REPORT  PATIENT NAME: Sabrina Barclay    :  3/25/1930  MRN: [id number]  Pt Location: [facility] OR ROOM 01    SURGERY DATE: 2025    Surgeons and Role:     * Vaibhav Rosario MD - Primary     * Young Guillen PA-C - Assisting    Preop Diagnosis:  Hip fracture (HCC) [S72.009A]    Post-Op Diagnosis Codes:     * Hip fracture (HCC) [S72.009A]    Procedure(s):  Left - INSERTION NAIL IM FEMUR ANTEGRADE (TROCHANTERIC)    Specimen(s):  * No specimens in log *    Estimated Blood Loss:   Minimal    Drains:  External Urinary Catheter (Active)   Collection Container Canister and suction tubing (For Female) 25   Suction Pressure (mmHg) 80 mmHg 25   Interventions Removed and skin assessed;Pericare performed;Device changed 25   Number of days: 1       Anesthesia Type:   General    Operative Indications:  Hip fracture (HCC) [S72.009A]      Operative Findings:  Intertrochanteric femur fracture      Complications:   None    Procedure and Technique:  89 y.o. female presenting with a Left intertrochanteric femur fracture.  I discussed the diagnosis with her and her family today.  We discussed operative and nonoperative treatment options for this.  Given that the patient is ambulator we discussed that operative invention would give her the best chance of getting back to ambulating and try to get back to her level of functioning.  We did discuss that even with surgery some people lose a level of ambulation.  We discussed the risk benefits and alternatives to surgery today.   The risks of operative intervention were discussed and include but are not limited to: Infection, bleeding, stiffness, loss of range of motion, blood clot, failure of surgery, fracture, delayed union, nonunion, malunion, risk of potential future arthritis, continued problems with swelling, injury to surrounding structures/nerve/artery/vein, recurrence of cysts, failure of hardware, retained hardware and/or foreign body,  symptomatic hardware, and continued instability, pain, dysfunction, or disability despite repair.     Operative Findings:  Comminuted Intertrochanteric Femur Fracture    Complications:   None      Implants:  Synthes TFN Cephalomedullary Nail [id number] 125 degree  Lag Screw 95mm  Distal Interlock Screw 5x34mm    Procedure and Technique:  The patient was met in the preoperative holding area where a surgical site marking was performed with the surgical staff.  They were met by the anesthesia team who came up with a plan for anesthesia.  They was given preoperative TXA, 1 g and appropriate perioperative antibiotics.  General anesthesia was then induced.  They were then placed on the [place] table with well-padded boots and a well-padded perineal post.  All bony prominences were well-padded.  The well leg was dropped about 30 degrees to facilitate x-ray.  A final surgical timeout was then performed identifying the correct patient, correct site and correct surgery.  All were in agreement we decided to proceed.    Fluoroscopic imaging was used to obtain images of the reduction.  Using the [place] table fracture was reduced with traction, adduction and interval internal rotation.  Were happy with our overall reduction.  The patient was prepped and draped in the usual sterile fashion.  An incision was made above the greater trochanter about 3 cm.  Incision made through skin and skin and subcutaneous tissue down to the IT band.  The IT band was split longitudinally.  A starting guidewire was then placed on the medial aspect of the greater trochanter and directly in the center on the lateral view.  This was advanced down the proximal femur.  We were happy with the placement on fluoroscopy and an opening reamer was then used to open the proximal canal.  Following this a guidewire was placed down the femoral canal.  Fluoroscopy was used to make sure this was in a good spot distally in the center of the canal.  Given that there was  excessive bow to the midshaft femur a short cephalomedullary nail was utilized to decrease the risk of anterior perforation.   An 12 mm x 170 mm, 125 degrees was then opened and assembled on the back table.  The nail was then placed using fluoroscopy to  the placement of the nail and maintain our reduction.  Once we were happy with the positioning an incision was made laterally for the lag screw.  Incision was made through skin and subcutaneous tissue and the IT band was cut.  The guide was then placed on the way down to bone.  The guidewire was placed up the femoral neck ensuring that we are in a good position both in the AP and lateral fluoroscopic views making sure to be center center in the head and neck.  Percutaneously a bone hook was used to maintain reduction of the proximal segment.  We then measured the length of the screw and found to be 95 mm of a length.  The lateral cortex was then drilled followed by drilling for the lag screw over the cannulated wire.  We then placed our 95 mm screw with good purchase and maintenance of reduction.  Following this traction was released.  1 distal interlock screw was placed through the jig in the nail.  Final fluoroscopic images were then performed and were happy with our final reduction and placement of hardware.  At this point we proceeded to close.  The wounds were copiously irrigated.  The proximal wound, the IT band was closed with 0 Vicryl suture.  The skin was then closed in a layered fashion with 0 Vicryl, 2-0 Vicryl and staples for the skin.  Percutaneous wounds were closed with 2-0 Vicryl and staples for the skin.  Mepilex dressings were placed over the wounds.  All counts were correct at the end of the case.  She awoke from anesthesia without complication.    They will be at weightbearing as tolerated.  Should be on appropriate DVT prophylaxis, aspirin for at least 30 days postoperatively.  She will be monitored by our team and the medical team  postoperatively.  She will participate in physical therapy postop day 1.  They will follow-up in 2 weeks for staple removal.     I was present for the entire procedure., A qualified resident physician was not available., and A physician assistant was required during the procedure for retraction, tissue handling, dissection and suturing.    Patient Disposition:  PACU              SIGNATURE: Vaibhav Rosario MD  DATE: February 28, 2025  TIME: 3:35 PM

## 2025-02-28 NOTE — ASSESSMENT & PLAN NOTE
POD#1 s/p short IMN left hip  Ancef 1g IV x 2 for 24 hours postop  DVT prophylaxis: Asa 81mg BID/SCD's/Ambulation  PT/OT- WBAT LLE  Analgesia PRN - mabel protocol  Follow up AM labs - 2g drop in hgb, continue to monitor and replete with IV fluids.  No sign of hematoma or drainage on the dressings  Post op medical management per primary team  Dressing- monitor for drainage, Mepilex dsgs may remain in place 7 days  Discharge planning -ongoing

## 2025-02-28 NOTE — PROGRESS NOTES
Progress Note - Cardiology   Name: Sabrina Barclay 89 y.o. female I MRN: [id number]  Unit/Bed#: [address] [place] [address] Date of Admission: 2/26/2025   Date of Service: 2/28/2025 I Hospital Day: 2    Assessment & Plan  Closed fracture of left hip (HCC)  Patient's status post surgery; tolerated well  echo showing EF 65% w/ normal systolic function and G1DD   Benign essential hypertension  History of  Maintained on losartan 50 mg  Dementia (HCC)  Follow-up with family for additional history  Left voicemail for son yesterday    Subjective   Chief Complaint: Unwitnessed fall  Patient seen resting in chair.  Per aide she was able to stand up for a brief minute before having to sit down again.  Patient has no new acute complaints however was a little slow to answer questions when prompted for further patient reported that she would need to urinate.  Encouraged patient to take deep breaths.    Objective :  Temp:  [98.1 °F (36.7 °C)-98.9 °F (37.2 °C)] 98.4 °F (36.9 °C)  HR:  [81-97] 86  BP: (100-138)/(50-87) 104/50  Resp:  [18-20] 19  SpO2:  [89 %-100 %] 94 %  O2 Device: None (Room air)  Nasal Cannula O2 Flow Rate (L/min):  [2 L/min] 2 L/min  Orthostatic Blood Pressures      Flowsheet Row Most Recent Value   Blood Pressure 104/50 filed at 02/28/2025 0948   Patient Position - Orthostatic VS Lying filed at 02/27/2025 0944          First Weight: Weight - Scale: 67.3 kg (148 lb 5.9 oz) (02/26/25 1800)  Vitals:    02/26/25 2230 02/27/25 1035   Weight: 67.3 kg (148 lb 5.9 oz) 67.1 kg (148 lb)     Physical Exam  Vitals and nursing note reviewed.   Constitutional:       General: She is not in acute distress.     Appearance: She is well-developed. She is not diaphoretic.   HENT:      Head: Normocephalic and atraumatic.   Eyes:      General: No scleral icterus.        Right eye: No discharge.         Left eye: No discharge.      Extraocular Movements: Extraocular movements intact.      Conjunctiva/sclera: Conjunctivae normal.   Cardiovascular:       Rate and Rhythm: Normal rate.      Pulses: Normal pulses.   Pulmonary:      Effort: Pulmonary effort is normal. No respiratory distress.      Breath sounds: Normal breath sounds. No wheezing or rales.   Abdominal:      General: Abdomen is flat. Bowel sounds are normal. There is no distension.      Palpations: Abdomen is soft.      Tenderness: There is no abdominal tenderness. There is no guarding.   Musculoskeletal:         General: Tenderness present. No swelling.      Cervical back: Normal range of motion and neck supple.      Right lower leg: No edema.      Left lower leg: No edema.   Skin:     General: Skin is warm and dry.      Capillary Refill: Capillary refill takes less than 2 seconds.      Findings: No bruising or erythema.   Neurological:      General: No focal deficit present.      Mental Status: She is alert and oriented to person, place, and time.      Gait: Gait abnormal (wheelchair for ambulation at baseline).   Psychiatric:         Mood and Affect: Mood normal.         Behavior: Behavior normal.         Lab Results: I have reviewed the following results:  Results from last 7 days   Lab Units 02/28/25  0631 02/27/25  0635 02/26/25  1821   WBC Thousand/uL 7.26 7.83 7.26   HEMOGLOBIN g/dL 8.1* 10.3* 11.5   HEMATOCRIT % 26.0* 32.5* 36.9   PLATELETS Thousands/uL 224 264 297     Results from last 7 days   Lab Units 02/28/25  0631 02/27/25  0635 02/26/25  1821   POTASSIUM mmol/L 4.2 4.4 4.2   CHLORIDE mmol/L 103 102 102   CO2 mmol/L 22 21 22   BUN mg/dL 17 17 15   CREATININE mg/dL 1.00 1.09 1.00   CALCIUM mg/dL 8.1* 9.0 9.6     Results from last 7 days   Lab Units 02/27/25  0635   INR  1.04     Lab Results   Component Value Date    HGBA1C 7.7 (H) 02/26/2025     Lab Results   Component Value Date    TROPONINI <0.02 05/22/2019             VTE Pharmacologic Prophylaxis: VTE covered by:    None     VTE Mechanical Prophylaxis: sequential compression device

## 2025-02-28 NOTE — PLAN OF CARE
Problem: PAIN - ADULT  Goal: Verbalizes/displays adequate comfort level or baseline comfort level  Description: Interventions:  - Encourage patient to monitor pain and request assistance  - Assess pain using appropriate pain scale  - Administer analgesics based on type and severity of pain and evaluate response  - Implement non-pharmacological measures as appropriate and evaluate response  - Consider cultural and social influences on pain and pain management  - Notify physician/advanced practitioner if interventions unsuccessful or patient reports new pain  Outcome: Progressing     Problem: INFECTION - ADULT  Goal: Absence or prevention of progression during hospitalization  Description: INTERVENTIONS:  - Assess and monitor for signs and symptoms of infection  - Monitor lab/diagnostic results  - Monitor all insertion sites, i.e. indwelling lines, tubes, and drains  - Monitor endotracheal if appropriate and nasal secretions for changes in amount and color  - Hasty appropriate cooling/warming therapies per order  - Administer medications as ordered  - Instruct and encourage patient and family to use good hand hygiene technique  - Identify and instruct in appropriate isolation precautions for identified infection/condition  Outcome: Progressing  Goal: Absence of fever/infection during neutropenic period  Description: INTERVENTIONS:  - Monitor WBC    Outcome: Progressing     Problem: SAFETY ADULT  Goal: Patient will remain free of falls  Description: INTERVENTIONS:  - Educate patient/family on patient safety including physical limitations  - Instruct patient to call for assistance with activity   - Consult OT/PT to assist with strengthening/mobility   - Keep Call bell within reach  - Keep bed low and locked with side rails adjusted as appropriate  - Keep care items and personal belongings within reach  - Initiate and maintain comfort rounds  - Make Fall Risk Sign visible to staff  - Offer Toileting every 2 Hours,  in advance of need  - Initiate/Maintain bed alarm  - Obtain necessary fall risk management equipment: call bell   - Apply yellow socks and bracelet for high fall risk patients  - Consider moving patient to room near nurses station  Outcome: Progressing  Goal: Maintain or return to baseline ADL function  Description: INTERVENTIONS:  -  Assess patient's ability to carry out ADLs; assess patient's baseline for ADL function and identify physical deficits which impact ability to perform ADLs (bathing, care of mouth/teeth, toileting, grooming, dressing, etc.)  - Assess/evaluate cause of self-care deficits   - Assess range of motion  - Assess patient's mobility; develop plan if impaired  - Assess patient's need for assistive devices and provide as appropriate  - Encourage maximum independence but intervene and supervise when necessary  - Involve family in performance of ADLs  - Assess for home care needs following discharge   - Consider OT consult to assist with ADL evaluation and planning for discharge  - Provide patient education as appropriate  Outcome: Progressing  Goal: Maintains/Returns to pre admission functional level  Description: INTERVENTIONS:  - Perform AM-PAC 6 Click Basic Mobility/ Daily Activity assessment daily.  - Set and communicate daily mobility goal to care team and patient/family/caregiver.   - Collaborate with rehabilitation services on mobility goals if consulted  - Perform Range of Motion 3 times a day.  - Reposition patient every 3 hours.  - Dangle patient 3 times a day  - Stand patient 3 times a day  - Ambulate patient 3 times a day  - Out of bed to chair 3 times a day   - Out of bed for meals 3 times a day  - Out of bed for toileting  - Record patient progress and toleration of activity level   Outcome: Progressing     Problem: DISCHARGE PLANNING  Goal: Discharge to home or other facility with appropriate resources  Description: INTERVENTIONS:  - Identify barriers to discharge w/patient and  caregiver  - Arrange for needed discharge resources and transportation as appropriate  - Identify discharge learning needs (meds, wound care, etc.)  - Arrange for interpretive services to assist at discharge as needed  - Refer to Case Management Department for coordinating discharge planning if the patient needs post-hospital services based on physician/advanced practitioner order or complex needs related to functional status, cognitive ability, or social support system  Outcome: Progressing     Problem: Knowledge Deficit  Goal: Patient/family/caregiver demonstrates understanding of disease process, treatment plan, medications, and discharge instructions  Description: Complete learning assessment and assess knowledge base.  Interventions:  - Provide teaching at level of understanding  - Provide teaching via preferred learning methods  Outcome: Progressing     Problem: Nutrition/Hydration-ADULT  Goal: Nutrient/Hydration intake appropriate for improving, restoring or maintaining nutritional needs  Description: Monitor and assess patient's nutrition/hydration status for malnutrition. Collaborate with interdisciplinary team and initiate plan and interventions as ordered.  Monitor patient's weight and dietary intake as ordered or per policy. Utilize nutrition screening tool and intervene as necessary. Determine patient's food preferences and provide high-protein, high-caloric foods as appropriate.     INTERVENTIONS:  - Monitor oral intake, urinary output, labs, and treatment plans  - Assess nutrition and hydration status and recommend course of action  - Evaluate amount of meals eaten  - Assist patient with eating if necessary   - Allow adequate time for meals  - Recommend/ encourage appropriate diets, oral nutritional supplements, and vitamin/mineral supplements  - Order, calculate, and assess calorie counts as needed  - Recommend, monitor, and adjust tube feedings and TPN/PPN based on assessed needs  - Assess need for  intravenous fluids  - Provide specific nutrition/hydration education as appropriate  - Include patient/family/caregiver in decisions related to nutrition  Outcome: Progressing     Problem: Prexisting or High Potential for Compromised Skin Integrity  Goal: Skin integrity is maintained or improved  Description: INTERVENTIONS:  - Identify patients at risk for skin breakdown  - Assess and monitor skin integrity  - Assess and monitor nutrition and hydration status  - Monitor labs   - Assess for incontinence   - Turn and reposition patient  - Assist with mobility/ambulation  - Relieve pressure over bony prominences  - Avoid friction and shearing  - Provide appropriate hygiene as needed including keeping skin clean and dry  - Evaluate need for skin moisturizer/barrier cream  - Collaborate with interdisciplinary team   - Patient/family teaching  - Consider wound care consult   Outcome: Progressing     Problem: Potential for Falls  Goal: Patient will remain free of falls  Description: INTERVENTIONS:  - Educate patient/family on patient safety including physical limitations  - Instruct patient to call for assistance with activity   - Consult OT/PT to assist with strengthening/mobility   - Keep Call bell within reach  - Keep bed low and locked with side rails adjusted as appropriate  - Keep care items and personal belongings within reach  - Initiate and maintain comfort rounds  - Make Fall Risk Sign visible to staff  - Offer Toileting every 2 Hours, in advance of need  - Initiate/Maintain bed alarm  - Obtain necessary fall risk management equipment: call bell   - Apply yellow socks and bracelet for high fall risk patients  - Consider moving patient to room near nurses station  Outcome: Progressing

## 2025-02-28 NOTE — PLAN OF CARE
Problem: PHYSICAL THERAPY ADULT  Goal: Performs mobility at highest level of function for planned discharge setting.  See evaluation for individualized goals.  Description: Treatment/Interventions: Therapeutic exercise, Elevations, LE strengthening/ROM, Functional transfer training, ADL retraining, Gait training, Bed mobility, Equipment eval/education, Patient/family training, Spoke to nursing          See flowsheet documentation for full assessment, interventions and recommendations.  Note: Prognosis: Good  Problem List: Decreased strength, Decreased range of motion, Impaired balance, Decreased mobility, Pain, Decreased cognition  Assessment: Pt is 89 y.o. female seen for PT evaluation s/p admit to [facility] on 2/26/2025 w/ Closed fracture of left hip (HCC). PT consulted to assess pt's functional mobility and d/c needs. Order placed for PT eval and tx, w/ WBAT L LE order.  Co-morbidities affecting patient's physical performance include: dementia, depression, DM, fall.  Personal factors affecting patient at time of initial evaluation include: ambulating with assistive device, inability to ambulate household distances, decreased cognition, and positive fall history.     Prior to admission, patient was requiring assist with ADLs and ambulation.  Upon evaluation: Pt transferred bed to chair with a walker with max assist x 2.      Please find objective findings from Physical Therapy assessment regarding body systems outlined above with impairments and limitations including weakness, impaired balance, gait deviations, pain, decreased activity tolerance, decreased functional mobility tolerance, and fall risk.The Barthel Index was used as a functional outcome tool presenting with a score of Barthel Index Score: 25 today indicating marked limitations of functional mobility and ADLS.  Patient's clinical presentation is currently unstable/unpredictable as seen in patient's presentation of changing level of pain,  varying levels of cognitive performance, increased fall risk, new onset of impairment of functional mobility, and new onset of weakness. Pt would benefit from continued Physical Therapy treatment to address deficits as defined above and maximize level of functional mobility.     As demonstrated by objective findings, the assigned level of complexity for this evaluation is high.The patient's AM-[facility] Basic Mobility Inpatient Short Form Raw Score is 8. A Raw score of less than or equal to 16 suggests the patient may benefit from discharge to post-acute rehabilitation services. Please also refer to the recommendation of the Physical Therapist for safe discharge planning.        Rehab Resource Intensity Level, PT: II (Moderate Resource Intensity)    See flowsheet documentation for full assessment.

## 2025-02-28 NOTE — Clinical Note
Goals established to promote Patient Goals: unable to state due to cognitive impairment:  Eating: supervision; Grooming: supervision seated; Bathing: mod assist; Upper Body Dressing mod assist; Lower Body Dressing: mod assist; Toileting: mod assist; Patient will increase stand pivot commode transfer to mod assist of 2 with rolling walker to increase performance and safety with ADLS and functional mobility; Patient will increase standing tolerance to {knumbers:72163} minutes during ADL task to decrease assistance level and decrease fall risk; Patient will increase bed mobility to {ksassistance:20241} in preparation for ADLS and transfers; Patient will increase functional mobility to and from bathroom with {kassistivedevice:57972} {kassistiveindependently:91813} to increase performance with ADLS and to use a toilet; Patient will tolerate {knumbers:00895} minutes of UE ROM/strengthening to increase general activity tolerance and performance in ADLS/IADLS; Patient will improve functional activity tolerance to {knumbers:37204} minutes of sustained functional tasks to increase participation in basic self-care and decrease assistance level;  Patient will be able to to verbalize understanding and perform energy conservation/proper body mechanics during ADLS and functional mobility at least {percentage:41701}% of the time with {kcuein} cueing to decrease signs of fatigue and increase stamina to return to prior level of function; Patient will increase {static/dynamic:20718} sitting balance to {kbalance:53334} to improve the ability to sit at edge of bed or on a chair for ADLS;  Patient will increase {static/dynamic:64639} standing balance to {kbalance:02424} to improve postural stability and decrease fall risk during standing ADLS and transfers.        Eating: {ksassistance:78363}; Grooming: {ksassistance:50985} {sit/stand:88082}; Bathing: {ksassistance:91700}; Upper Body Dressing {ksassistance:01782}; Lower Body  Dressing: {ksassistance:50811}; Toileting: {ksassistance:63806}; Patient will increase {transfers type:45271} {toilet type 1:88803} transfer to {ksassistance:13109} with {kassistivedevice:93603} to increase performance and safety with ADLS and functional mobility; Patient will increase standing tolerance to {knumbers:31300} minutes during ADL task to decrease assistance level and decrease fall risk; Patient will increase bed mobility to {ksassistance:90417} in preparation for ADLS and transfers; Patient will increase functional mobility to and from bathroom with {kassistivedevice:09207} {kassistiveindependently:86764} to increase performance with ADLS and to use a toilet; Patient will tolerate {knumbers:80832} minutes of UE ROM/strengthening to increase general activity tolerance and performance in ADLS/IADLS; Patient will improve functional activity tolerance to {knumbers:30259} minutes of sustained functional tasks to increase participation in basic self-care and decrease assistance level;  Patient will be able to to verbalize understanding and perform energy conservation/proper body mechanics during ADLS and functional mobility at least {percentage:62487}% of the time with {kcuein} cueing to decrease signs of fatigue and increase stamina to return to prior level of function; Patient will increase {static/dynamic:12509} sitting balance to {kbalance:52135} to improve the ability to sit at edge of bed or on a chair for ADLS;  Patient will increase {static/dynamic:92040} standing balance to {kbalance:71855} to improve postural stability and decrease fall risk during standing ADLS and transfers.  Pt will score >/= {ampac numbers:86790}/24 on AM-PAC Daily Activity Inpatient scale to promote safe independence with ADLs and functional mobility; Pt will score >/= {barthel numbers:58169}/100 on Barthel Index in order to decrease caregiver assistance needed and increase ability to perform ADLs and functional  mobility.    Patient will demonstrate compensatory techniques for low vision with minimal verbal cues to increase safety awareness, increase independence with ADLS and decrease fall risk.    Patient will orient self x 4 with minimal verbal cues to increase overall awareness and promote safety with ADL/IADL tasks.

## 2025-02-28 NOTE — ASSESSMENT & PLAN NOTE
Patient's status post surgery; tolerated well  echo showing EF 65% w/ normal systolic function and G1DD

## 2025-02-28 NOTE — ASSESSMENT & PLAN NOTE
Lab Results   Component Value Date    EGFR 48 02/28/2025    EGFR 43 02/27/2025    EGFR 48 02/26/2025    CREATININE 1.00 02/28/2025    CREATININE 1.09 02/27/2025    CREATININE 1.00 02/26/2025     Creatinine 1.00. Will continue to monitor.

## 2025-03-01 LAB
ANION GAP SERPL CALCULATED.3IONS-SCNC: 11 MMOL/L (ref 4–13)
BASOPHILS # BLD AUTO: 0.04 THOUSANDS/ÂΜL (ref 0–0.1)
BASOPHILS NFR BLD AUTO: 1 % (ref 0–1)
BUN SERPL-MCNC: 18 MG/DL (ref 5–25)
CALCIUM SERPL-MCNC: 8.5 MG/DL (ref 8.4–10.2)
CHLORIDE SERPL-SCNC: 99 MMOL/L (ref 96–108)
CO2 SERPL-SCNC: 22 MMOL/L (ref 21–32)
CREAT SERPL-MCNC: 1.08 MG/DL (ref 0.6–1.3)
EOSINOPHIL # BLD AUTO: 0.14 THOUSAND/ÂΜL (ref 0–0.61)
EOSINOPHIL NFR BLD AUTO: 2 % (ref 0–6)
ERYTHROCYTE [DISTWIDTH] IN BLOOD BY AUTOMATED COUNT: 13.8 % (ref 11.6–15.1)
GFR SERPL CREATININE-BSD FRML MDRD: 44 ML/MIN/1.73SQ M
GLUCOSE SERPL-MCNC: 155 MG/DL (ref 65–140)
GLUCOSE SERPL-MCNC: 181 MG/DL (ref 65–140)
GLUCOSE SERPL-MCNC: 182 MG/DL (ref 65–140)
GLUCOSE SERPL-MCNC: 211 MG/DL (ref 65–140)
GLUCOSE SERPL-MCNC: 215 MG/DL (ref 65–140)
HCT VFR BLD AUTO: 26.1 % (ref 34.8–46.1)
HGB BLD-MCNC: 8.1 G/DL (ref 11.5–15.4)
IMM GRANULOCYTES # BLD AUTO: 0.05 THOUSAND/UL (ref 0–0.2)
IMM GRANULOCYTES NFR BLD AUTO: 1 % (ref 0–2)
LYMPHOCYTES # BLD AUTO: 1.12 THOUSANDS/ÂΜL (ref 0.6–4.47)
LYMPHOCYTES NFR BLD AUTO: 13 % (ref 14–44)
MAGNESIUM SERPL-MCNC: 2.1 MG/DL (ref 1.9–2.7)
MCH RBC QN AUTO: 29.2 PG (ref 26.8–34.3)
MCHC RBC AUTO-ENTMCNC: 31 G/DL (ref 31.4–37.4)
MCV RBC AUTO: 94 FL (ref 82–98)
MONOCYTES # BLD AUTO: 0.55 THOUSAND/ÂΜL (ref 0.17–1.22)
MONOCYTES NFR BLD AUTO: 6 % (ref 4–12)
NEUTROPHILS # BLD AUTO: 6.69 THOUSANDS/ÂΜL (ref 1.85–7.62)
NEUTS SEG NFR BLD AUTO: 77 % (ref 43–75)
NRBC BLD AUTO-RTO: 0 /100 WBCS
PLATELET # BLD AUTO: 247 THOUSANDS/UL (ref 149–390)
PMV BLD AUTO: 10.6 FL (ref 8.9–12.7)
POTASSIUM SERPL-SCNC: 3.8 MMOL/L (ref 3.5–5.3)
RBC # BLD AUTO: 2.77 MILLION/UL (ref 3.81–5.12)
SODIUM SERPL-SCNC: 132 MMOL/L (ref 135–147)
WBC # BLD AUTO: 8.59 THOUSAND/UL (ref 4.31–10.16)

## 2025-03-01 PROCEDURE — 82948 REAGENT STRIP/BLOOD GLUCOSE: CPT

## 2025-03-01 PROCEDURE — 99232 SBSQ HOSP IP/OBS MODERATE 35: CPT | Performed by: FAMILY MEDICINE

## 2025-03-01 PROCEDURE — 99024 POSTOP FOLLOW-UP VISIT: CPT | Performed by: PHYSICIAN ASSISTANT

## 2025-03-01 PROCEDURE — 85025 COMPLETE CBC W/AUTO DIFF WBC: CPT | Performed by: FAMILY MEDICINE

## 2025-03-01 PROCEDURE — 83735 ASSAY OF MAGNESIUM: CPT | Performed by: FAMILY MEDICINE

## 2025-03-01 PROCEDURE — 80048 BASIC METABOLIC PNL TOTAL CA: CPT | Performed by: FAMILY MEDICINE

## 2025-03-01 RX ORDER — INSULIN GLARGINE 100 [IU]/ML
10 INJECTION, SOLUTION SUBCUTANEOUS EVERY MORNING
Status: DISCONTINUED | OUTPATIENT
Start: 2025-03-02 | End: 2025-03-03 | Stop reason: HOSPADM

## 2025-03-01 RX ADMIN — MEMANTINE 5 MG: 5 TABLET ORAL at 17:55

## 2025-03-01 RX ADMIN — DONEPEZIL HYDROCHLORIDE 5 MG: 5 TABLET ORAL at 21:37

## 2025-03-01 RX ADMIN — Medication 250 MG: at 10:23

## 2025-03-01 RX ADMIN — INSULIN LISPRO 1 UNITS: 100 INJECTION, SOLUTION INTRAVENOUS; SUBCUTANEOUS at 16:34

## 2025-03-01 RX ADMIN — Medication 400 MG: at 10:23

## 2025-03-01 RX ADMIN — ACETAMINOPHEN 975 MG: 325 TABLET ORAL at 05:14

## 2025-03-01 RX ADMIN — ASPIRIN 81 MG CHEWABLE TABLET 81 MG: 81 TABLET CHEWABLE at 10:23

## 2025-03-01 RX ADMIN — Medication 250 MG: at 17:55

## 2025-03-01 RX ADMIN — PANTOPRAZOLE SODIUM 40 MG: 40 TABLET, DELAYED RELEASE ORAL at 10:23

## 2025-03-01 RX ADMIN — ACETAMINOPHEN 975 MG: 325 TABLET ORAL at 21:37

## 2025-03-01 RX ADMIN — MEMANTINE 5 MG: 5 TABLET ORAL at 10:23

## 2025-03-01 RX ADMIN — PAROXETINE 40 MG: 20 TABLET, FILM COATED ORAL at 10:23

## 2025-03-01 RX ADMIN — ASPIRIN 81 MG CHEWABLE TABLET 81 MG: 81 TABLET CHEWABLE at 21:37

## 2025-03-01 RX ADMIN — INSULIN LISPRO 1 UNITS: 100 INJECTION, SOLUTION INTRAVENOUS; SUBCUTANEOUS at 11:41

## 2025-03-01 RX ADMIN — MELATONIN TAB 3 MG 4.5 MG: 3 TAB at 21:37

## 2025-03-01 RX ADMIN — Medication 1000 UNITS: at 10:23

## 2025-03-01 RX ADMIN — ACETAMINOPHEN 975 MG: 325 TABLET ORAL at 13:29

## 2025-03-01 RX ADMIN — INSULIN LISPRO 1 UNITS: 100 INJECTION, SOLUTION INTRAVENOUS; SUBCUTANEOUS at 07:30

## 2025-03-01 RX ADMIN — MIRTAZAPINE 15 MG: 15 TABLET, FILM COATED ORAL at 21:37

## 2025-03-01 NOTE — PROGRESS NOTES
Progress Note - Orthopedics   Name: Sabrina Braclay 89 y.o. female I MRN: [id number]  Unit/Bed#: [address] [place] [address] Date of Admission: 2/26/2025   Date of Service: 3/1/2025 I Hospital Day: 3    Assessment & Plan  Closed fracture of left hip (HCC)  POD#2 s/p short IMN left hip with Dr. Rosario  Perioperative antibiotics completed  DVT prophylaxis: Asa 81mg BID/SCD's/Ambulation  PT/OT- WBAT LLE  Analgesia PRN - mabel protocol  Follow up AM labs - 2g drop in hgb from preop, but stable since yesterday.  No sign of hematoma or drainage on the dressings  Post op medical management per primary team  Dressing- monitor for drainage, Mepilex dsgs may remain in place 7 days  Discharge planning -discussed with internal medicine.  Patient is stable for discharge from orthopedic perspective.  Plan will be for discharge to a rehab facility tomorrow    Benign essential hypertension    Type 2 diabetes mellitus, with long-term current use of insulin ([facility])  Lab Results   Component Value Date    HGBA1C 7.7 (H) 02/26/2025       Recent Labs     02/28/25  1549 02/28/25  2125 03/01/25  0728 03/01/25  1108   POCGLU 198* 241* 211* 182*       Blood Sugar Average: Last 72 hrs:  (P) 196.125    Depression    Dementia ([facility])    CKD (chronic kidney disease) stage 3, GFR 30-59 ml/min ([facility])  Lab Results   Component Value Date    EGFR 44 03/01/2025    EGFR 48 02/28/2025    EGFR 43 02/27/2025    CREATININE 1.08 03/01/2025    CREATININE 1.00 02/28/2025    CREATININE 1.09 02/27/2025     History of hysterectomy    Acute blood loss anemia      Orthopedics service will follow.    Subjective   89 y.o.female POD#2. No acute events, no new complaints. Pain well controlled. Denies fevers, chills, CP, SOB, N/V, numbness or tingling. Patient reports no issues with urination. Patient is at baseline mentation and confused.  She does not recall having surgery on the left hip.    Objective :  Temp:  [98 °F (36.7 °C)-98.8 °F (37.1 °C)] 98.3 °F (36.8 °C)  HR:  [85-88] 88  BP:  ()/(45-50) 108/47  Resp:  [16-18] 18  SpO2:  [90 %-96 %] 96 %  O2 Device: Nasal cannula  Nasal Cannula O2 Flow Rate (L/min):  [2 L/min] 2 L/min    Physical Exam  Vitals and nursing note reviewed.   Constitutional:       Appearance: Normal appearance. She is well-developed.      Comments: Body mass index is 25.4 kg/m².   HENT:      Head: Normocephalic and atraumatic.      Right Ear: External ear normal.      Left Ear: External ear normal.   Eyes:      Extraocular Movements: Extraocular movements intact.      Conjunctiva/sclera: Conjunctivae normal.   Cardiovascular:      Rate and Rhythm: Normal rate.      Pulses: Normal pulses.   Pulmonary:      Effort: Pulmonary effort is normal.   Abdominal:      Palpations: Abdomen is soft.   Musculoskeletal:      Cervical back: Normal range of motion.      Comments: See ortho exam   Skin:     General: Skin is warm and dry.   Neurological:      General: No focal deficit present.      Mental Status: She is alert and oriented to person, place, and time. Mental status is at baseline.   Psychiatric:         Mood and Affect: Mood normal.         Behavior: Behavior normal.         Thought Content: Thought content normal.         Judgment: Judgment normal.       Musculoskeletal: Left hip  Skin intact . No erythema or ecchymosis.  Mild edema, compartments soft  Dressing lateral hip dsgs c/d/i  Motor intact to +FHL/EHL, +ankle dorsi/plantar flexion  Sensation intact to saphenous, sural, tibial, superficial peroneal nerve, and deep peroneal  2+ DP pulse  No calf swelling or tenderness to palpation      Lab Results: I have reviewed the following results:  Recent Labs     02/27/25  0635 02/28/25  0631 03/01/25  0443   WBC 7.83 7.26 8.59   HGB 10.3* 8.1* 8.1*   HCT 32.5* 26.0* 26.1*    224 247   BUN 17 17 18   CREATININE 1.09 1.00 1.08   INR 1.04  --   --

## 2025-03-01 NOTE — ASSESSMENT & PLAN NOTE
Hemoglobin 8.1 again today, Likely secondary to acute blood loss anemia secondary to orthopedic surgery. No signs of active bleeding.

## 2025-03-01 NOTE — ASSESSMENT & PLAN NOTE
Patient presented from [facility] after a unwitnessed fall  CT head, CT cervical spine negative for acute pathology  X-ray hips shows left hip fracture but official read is pending  Received TXA in the ER after discussion with Ortho on-call  Keep n.p.o. after midnight for surgical intervention in a.m. per Ortho  Cardiology consulted for preop clearance  Repeat lab work in a.m.  PT/OT once cleared by Ortho  2/27 - Plan for OR this evening with orthopedic surgery. Pain currently well controlled. 2D echo ordered by cardiology. Follow up results.  2/28 - Patient is s/p short IMN left hip yesterday with orthopedic surgery.  Follow-up with further recommendations from orthopedic surgery.  Physical therapy and Occupational Therapy consultations have been placed.  3/1 - Pain well controlled. Hemoglobin same today (8.1). Case management working on rehab placement. Plan to discharge to rehab tomorrow.

## 2025-03-01 NOTE — ASSESSMENT & PLAN NOTE
Lab Results   Component Value Date    EGFR 44 03/01/2025    EGFR 48 02/28/2025    EGFR 43 02/27/2025    CREATININE 1.08 03/01/2025    CREATININE 1.00 02/28/2025    CREATININE 1.09 02/27/2025

## 2025-03-01 NOTE — ASSESSMENT & PLAN NOTE
Lab Results   Component Value Date    EGFR 44 03/01/2025    EGFR 48 02/28/2025    EGFR 43 02/27/2025    CREATININE 1.08 03/01/2025    CREATININE 1.00 02/28/2025    CREATININE 1.09 02/27/2025     Creatinine 1.08. Will continue to monitor.

## 2025-03-01 NOTE — ASSESSMENT & PLAN NOTE
POD#2 s/p short IMN left hip with Dr. Rosario  Perioperative antibiotics completed  DVT prophylaxis: Asa 81mg BID/SCD's/Ambulation  PT/OT- WBAT LLE  Analgesia PRN - mabel protocol  Follow up AM labs - 2g drop in hgb from preop, but stable since yesterday.  No sign of hematoma or drainage on the dressings  Post op medical management per primary team  Dressing- monitor for drainage, Mepilex dsgs may remain in place 7 days  Discharge planning -discussed with internal medicine.  Patient is stable for discharge from orthopedic perspective.  Plan will be for discharge to a rehab facility tomorrow

## 2025-03-01 NOTE — CASE MANAGEMENT
Case Management Discharge Planning Note    Patient name Sabrina Barclay  Location [address] [address] [address] MRN [id number]  : 3/25/1930 Date 3/1/2025       Current Admission Date: 2025  Current Admission Diagnosis:Closed fracture of left hip (HCC)   Patient Active Problem List    Diagnosis Date Noted Date Diagnosed    Acute blood loss anemia 2025     History of hysterectomy 2025     Closed fracture of left hip (HCC) 2025     GERD (gastroesophageal reflux disease) 06/10/2024     Constipation 06/10/2024     Dementia ([facility])      CKD (chronic kidney disease) stage 3, GFR 30-59 ml/min ([facility])      Wound of right breast 2024     Status post fall 2019     Hyponatremia 2019     Benign essential hypertension 2019     Type 2 diabetes mellitus, with long-term current use of insulin ([facility]) 2019     Depression 2019     Hyperlipidemia 2019       LOS (days): 3  Geometric Mean LOS (GMLOS) (days): 4.4  Days to GMLOS:1.6     OBJECTIVE:  Risk of Unplanned Readmission Score: 16.18         Current admission status: Inpatient   Preferred Pharmacy:   [facility] [address] [address]  Phone: [phone] Fax: [phone]    Primary Care Provider: No primary care provider on file.    Primary Insurance: MEDICARE  Secondary Insurance: [employer] [facility]    DISCHARGE DETAILS:    Other Referral/Resources/Interventions Provided:  Interventions: Short Term Rehab  Referral Comments: [facility] reserved in aidn per family preference. TOSHIA CM made aware patient likely to be discharged on Sun 3/2. RN CM requested [facility] transportation via [employer] for Sun 3/2 for 1230, pending acceptance. RN NORMA called and spoke with son Miguel Angel to update.     Treatment Team Recommendation: Short Term Rehab  Discharge Destination Plan:: Short Term Rehab  Transport at Discharge : [facility]     Number/Name of Dispatcher:  [employer]  Transported by (Company and Unit #): TBD  ETA of Transport (Date): 03/02/25  ETA of Transport (Time): 1230 (pending acceptance)        IMM Given (Date):: 03/01/25  IMM Given to:: Family (IMM# 2 reviewed with patient's son Miguel Angel over phone. Miguel Angel verbalized understanding and consented. A copy of IMM placed at bedside and a copy placed in scan bin for chart.)

## 2025-03-01 NOTE — PROGRESS NOTES
Progress Note - Hospitalist   Name: Sabrina Barclay 89 y.o. female I MRN: [id number]  Unit/Bed#: 2 [address] Date of Admission: 2/26/2025   Date of Service: 3/1/2025 I Hospital Day: 3     Assessment & Plan  Closed fracture of left hip (HCC)  Patient presented from [facility] after a unwitnessed fall  CT head, CT cervical spine negative for acute pathology  X-ray hips shows left hip fracture but official read is pending  Received TXA in the ER after discussion with Ortho on-call  Keep n.p.o. after midnight for surgical intervention in a.m. per Ortho  Cardiology consulted for preop clearance  Repeat lab work in a.m.  PT/OT once cleared by Ortho  2/27 - Plan for OR this evening with orthopedic surgery. Pain currently well controlled. 2D echo ordered by cardiology. Follow up results.  2/28 - Patient is s/p short IMN left hip yesterday with orthopedic surgery.  Follow-up with further recommendations from orthopedic surgery.  Physical therapy and Occupational Therapy consultations have been placed.  3/1 - Pain well controlled. Hemoglobin same today (8.1). Case management working on rehab placement. Plan to discharge to rehab tomorrow.  Benign essential hypertension  Continue Cozaar and monitor blood pressures  Type 2 diabetes mellitus, with long-term current use of insulin ([facility])  Lab Results   Component Value Date    HGBA1C 7.7 (H) 02/26/2025       Recent Labs     02/28/25  1549 02/28/25  2125 03/01/25  0728 03/01/25  1108   POCGLU 198* 241* 211* 182*       Restart lantus tonight at 10 units. Hemoglobin A1c 7.7.  Continue insulin sliding scale.    Dementia (HCC)  Continue Namenda Aricept  CKD (chronic kidney disease) stage 3, GFR 30-59 ml/min ([facility])  Lab Results   Component Value Date    EGFR 44 03/01/2025    EGFR 48 02/28/2025    EGFR 43 02/27/2025    CREATININE 1.08 03/01/2025    CREATININE 1.00 02/28/2025    CREATININE 1.09 02/27/2025     Creatinine 1.08. Will continue to monitor.  Depression  Continue Paxil,  Remeron  History of hysterectomy    Acute blood loss anemia  Hemoglobin 8.1 again today, Likely secondary to acute blood loss anemia secondary to orthopedic surgery. No signs of active bleeding.  VTE Pharmacologic Prophylaxis: VTE Score: 11 Aspirin 81mg twice daily.    Mobility:   Basic Mobility Inpatient Raw Score: 12  -HL Goal: 4: Move to chair/commode  -HLM Achieved: 4: Move to chair/commode    Patient Centered Rounds: I performed bedside rounds with nursing staff today.     Education and Discussions with Family / Patient: Attempted to call patient's son, Miguel Angel. No answer again today. Voicemail was left.    Current Length of Stay: 3 day(s)  Current Patient Status: Inpatient   Certification Statement: The patient will continue to require additional inpatient hospital stay due to above.  Discharge Plan: TBD. Likely rehab.    Code Status: Level 3 - DNAR and DNI    Subjective   Patient was seen and examined at bedside.  No acute events overnight.  No new complaints.    Objective :  Temp:  [98 °F (36.7 °C)-98.8 °F (37.1 °C)] 98.3 °F (36.8 °C)  HR:  [85-88] 88  BP: ()/(45-50) 108/47  Resp:  [16-18] 18  SpO2:  [90 %-96 %] 96 %  O2 Device: Nasal cannula  Nasal Cannula O2 Flow Rate (L/min):  [2 L/min] 2 L/min    Body mass index is 25.4 kg/m².     Input and Output Summary (last 24 hours):     Intake/Output Summary (Last 24 hours) at 3/1/2025 1422  Last data filed at 3/1/2025 1042  Gross per 24 hour   Intake 500 ml   Output --   Net 500 ml     Physical Exam  HENT:      Head: Normocephalic.      Mouth/Throat:      Mouth: Mucous membranes are moist.   Eyes:      Extraocular Movements: Extraocular movements intact.   Cardiovascular:      Rate and Rhythm: Normal rate.   Pulmonary:      Effort: Pulmonary effort is normal. No respiratory distress.   Abdominal:      Palpations: Abdomen is soft.      Tenderness: There is no abdominal tenderness.   Skin:     General: Skin is warm.   Neurological:      Mental Status: She is  alert and oriented to person, place, and time.   Psychiatric:         Mood and Affect: Mood normal.       Lines/Drains:  Lines/Drains/Airways       Active Status       Name Placement date Placement time Site Days    External Urinary Catheter 02/27/25  0600  -- 2                    Lab Results: I have reviewed the following results:   Results from last 7 days   Lab Units 03/01/25  0443   WBC Thousand/uL 8.59   HEMOGLOBIN g/dL 8.1*   HEMATOCRIT % 26.1*   PLATELETS Thousands/uL 247   SEGS PCT % 77*   LYMPHO PCT % 13*   MONO PCT % 6   EOS PCT % 2     Results from last 7 days   Lab Units 03/01/25  0443 02/27/25  0635 02/26/25  1821   SODIUM mmol/L 132*   < > 134*   POTASSIUM mmol/L 3.8   < > 4.2   CHLORIDE mmol/L 99   < > 102   CO2 mmol/L 22   < > 22   BUN mg/dL 18   < > 15   CREATININE mg/dL 1.08   < > 1.00   ANION GAP mmol/L 11   < > 10   CALCIUM mg/dL 8.5   < > 9.6   ALBUMIN g/dL  --   --  3.9   TOTAL BILIRUBIN mg/dL  --   --  0.28   ALK PHOS U/L  --   --  87   ALT U/L  --   --  8   AST U/L  --   --  13   GLUCOSE RANDOM mg/dL 215*   < > 128    < > = values in this interval not displayed.     Results from last 7 days   Lab Units 02/27/25  0635   INR  1.04     Results from last 7 days   Lab Units 03/01/25  1108 03/01/25  0728 02/28/25  2125 02/28/25  1549 02/28/25  1124 02/27/25  2042 02/27/25  1133 02/27/25  0719   POC GLUCOSE mg/dl 182* 211* 241* 198* 216* 225* 151* 145*     Results from last 7 days   Lab Units 02/26/25  1821   HEMOGLOBIN A1C % 7.7*     Last 24 Hours Medication List:     Current Facility-Administered Medications:     acetaminophen (TYLENOL) tablet 975 mg, Q8H JAI    aspirin chewable tablet 81 mg, BID    Cholecalciferol (VITAMIN D3) tablet 1,000 Units, Daily    donepezil (ARICEPT) tablet 5 mg, HS    [Held by provider] insulin glargine (LANTUS) subcutaneous injection 10 Units 0.1 mL, QAM    insulin lispro (HumALOG/ADMELOG) 100 units/mL subcutaneous injection 1-5 Units, TID AC **AND** Fingerstick Glucose  (POCT), TID AC    losartan (COZAAR) tablet 50 mg, Daily    magnesium Oxide (MAG-OX) tablet 400 mg, Daily    melatonin tablet 4.5 mg, HS    memantine (NAMENDA) tablet 5 mg, BID    mirtazapine (REMERON) tablet 15 mg, HS    oxyCODONE (ROXICODONE) IR tablet 5 mg, Q6H PRN    oxyCODONE (ROXICODONE) split tablet 2.5 mg, Q6H PRN    pantoprazole (PROTONIX) EC tablet 40 mg, Daily    PARoxetine (PAXIL) tablet 40 mg, QAM    saccharomyces boulardii (FLORASTOR) capsule 250 mg, BID    Administrative Statements   Today, Patient Was Seen By: Gildardo Gunderson MD    **Please Note: This note may have been constructed using a voice recognition system.**

## 2025-03-01 NOTE — ASSESSMENT & PLAN NOTE
Lab Results   Component Value Date    HGBA1C 7.7 (H) 02/26/2025       Recent Labs     02/28/25  1549 02/28/25  2125 03/01/25  0728 03/01/25  1108   POCGLU 198* 241* 211* 182*       Restart lantus tonight at 10 units. Hemoglobin A1c 7.7.  Continue insulin sliding scale.

## 2025-03-01 NOTE — PLAN OF CARE
Problem: PAIN - ADULT  Goal: Verbalizes/displays adequate comfort level or baseline comfort level  Description: Interventions:  - Encourage patient to monitor pain and request assistance  - Assess pain using appropriate pain scale  - Administer analgesics based on type and severity of pain and evaluate response  - Implement non-pharmacological measures as appropriate and evaluate response  - Consider cultural and social influences on pain and pain management  - Notify physician/advanced practitioner if interventions unsuccessful or patient reports new pain  Outcome: Progressing     Problem: INFECTION - ADULT  Goal: Absence or prevention of progression during hospitalization  Description: INTERVENTIONS:  - Assess and monitor for signs and symptoms of infection  - Monitor lab/diagnostic results  - Monitor all insertion sites, i.e. indwelling lines, tubes, and drains  - Monitor endotracheal if appropriate and nasal secretions for changes in amount and color  - [place] appropriate cooling/warming therapies per order  - Administer medications as ordered  - Instruct and encourage patient and family to use good hand hygiene technique  - Identify and instruct in appropriate isolation precautions for identified infection/condition  Outcome: Progressing  Goal: Absence of fever/infection during neutropenic period  Description: INTERVENTIONS:  - Monitor WBC    Outcome: Progressing     Problem: SAFETY ADULT  Goal: Patient will remain free of falls  Description: INTERVENTIONS:  - Educate patient/family on patient safety including physical limitations  - Instruct patient to call for assistance with activity   - Consult OT/PT to assist with strengthening/mobility   - Keep Call bell within reach  - Keep bed low and locked with side rails adjusted as appropriate  - Keep care items and personal belongings within reach  - Initiate and maintain comfort rounds  - Make Fall Risk Sign visible to staff  - Offer Toileting every 2 Hours,  in advance of need  - Initiate/Maintain bed alarm  - Obtain necessary fall risk management equipment: call bell   - Apply yellow socks and bracelet for high fall risk patients  - Consider moving patient to room near nurses station  Outcome: Progressing  Goal: Maintain or return to baseline ADL function  Description: INTERVENTIONS:  -  Assess patient's ability to carry out ADLs; assess patient's baseline for ADL function and identify physical deficits which impact ability to perform ADLs (bathing, care of mouth/teeth, toileting, grooming, dressing, etc.)  - Assess/evaluate cause of self-care deficits   - Assess range of motion  - Assess patient's mobility; develop plan if impaired  - Assess patient's need for assistive devices and provide as appropriate  - Encourage maximum independence but intervene and supervise when necessary  - Involve family in performance of ADLs  - Assess for home care needs following discharge   - Consider OT consult to assist with ADL evaluation and planning for discharge  - Provide patient education as appropriate  Outcome: Progressing  Goal: Maintains/Returns to pre admission functional level  Description: INTERVENTIONS:  - Perform AM-PAC 6 Click Basic Mobility/ Daily Activity assessment daily.  - Set and communicate daily mobility goal to care team and patient/family/caregiver.   - Collaborate with rehabilitation services on mobility goals if consulted  - Perform Range of Motion 3 times a day.  - Reposition patient every 3 hours.  - Dangle patient 3 times a day  - Stand patient 3 times a day  - Ambulate patient 3 times a day  - Out of bed to chair 3 times a day   - Out of bed for meals 3 times a day  - Out of bed for toileting  - Record patient progress and toleration of activity level   Outcome: Progressing     Problem: DISCHARGE PLANNING  Goal: Discharge to home or other facility with appropriate resources  Description: INTERVENTIONS:  - Identify barriers to discharge w/patient and  caregiver  - Arrange for needed discharge resources and transportation as appropriate  - Identify discharge learning needs (meds, wound care, etc.)  - Arrange for interpretive services to assist at discharge as needed  - Refer to Case Management Department for coordinating discharge planning if the patient needs post-hospital services based on physician/advanced practitioner order or complex needs related to functional status, cognitive ability, or social support system  Outcome: Progressing     Problem: Knowledge Deficit  Goal: Patient/family/caregiver demonstrates understanding of disease process, treatment plan, medications, and discharge instructions  Description: Complete learning assessment and assess knowledge base.  Interventions:  - Provide teaching at level of understanding  - Provide teaching via preferred learning methods  Outcome: Progressing     Problem: Nutrition/Hydration-ADULT  Goal: Nutrient/Hydration intake appropriate for improving, restoring or maintaining nutritional needs  Description: Monitor and assess patient's nutrition/hydration status for malnutrition. Collaborate with interdisciplinary team and initiate plan and interventions as ordered.  Monitor patient's weight and dietary intake as ordered or per policy. Utilize nutrition screening tool and intervene as necessary. Determine patient's food preferences and provide high-protein, high-caloric foods as appropriate.     INTERVENTIONS:  - Monitor oral intake, urinary output, labs, and treatment plans  - Assess nutrition and hydration status and recommend course of action  - Evaluate amount of meals eaten  - Assist patient with eating if necessary   - Allow adequate time for meals  - Recommend/ encourage appropriate diets, oral nutritional supplements, and vitamin/mineral supplements  - Order, calculate, and assess calorie counts as needed  - Recommend, monitor, and adjust tube feedings and TPN/PPN based on assessed needs  - Assess need for  intravenous fluids  - Provide specific nutrition/hydration education as appropriate  - Include patient/family/caregiver in decisions related to nutrition  Outcome: Progressing     Problem: Prexisting or High Potential for Compromised Skin Integrity  Goal: Skin integrity is maintained or improved  Description: INTERVENTIONS:  - Identify patients at risk for skin breakdown  - Assess and monitor skin integrity  - Assess and monitor nutrition and hydration status  - Monitor labs   - Assess for incontinence   - Turn and reposition patient  - Assist with mobility/ambulation  - Relieve pressure over bony prominences  - Avoid friction and shearing  - Provide appropriate hygiene as needed including keeping skin clean and dry  - Evaluate need for skin moisturizer/barrier cream  - Collaborate with interdisciplinary team   - Patient/family teaching  - Consider wound care consult   Outcome: Progressing     Problem: Potential for Falls  Goal: Patient will remain free of falls  Description: INTERVENTIONS:  - Educate patient/family on patient safety including physical limitations  - Instruct patient to call for assistance with activity   - Consult OT/PT to assist with strengthening/mobility   - Keep Call bell within reach  - Keep bed low and locked with side rails adjusted as appropriate  - Keep care items and personal belongings within reach  - Initiate and maintain comfort rounds  - Make Fall Risk Sign visible to staff  - Offer Toileting every 2 Hours, in advance of need  - Initiate/Maintain bed alarm  - Obtain necessary fall risk management equipment: call bell   - Apply yellow socks and bracelet for high fall risk patients  - Consider moving patient to room near nurses station  Outcome: Progressing

## 2025-03-01 NOTE — CASE MANAGEMENT
Case Management Discharge Planning Note    Patient name Sabrina Barclay  Location [address] [place] [address]/[address] [place] [address] MRN [id number]  : 3/25/1930 Date 3/1/2025       Current Admission Date: 2025  Current Admission Diagnosis:Closed fracture of left hip (HCC)   Patient Active Problem List    Diagnosis Date Noted Date Diagnosed    Acute blood loss anemia 2025     History of hysterectomy 2025     Closed fracture of left hip (HCC) 2025     GERD (gastroesophageal reflux disease) 06/10/2024     Constipation 06/10/2024     Dementia ([facility])      CKD (chronic kidney disease) stage 3, GFR 30-59 ml/min ([facility])      Wound of right breast 2024     Status post fall 2019     Hyponatremia 2019     Benign essential hypertension 2019     Type 2 diabetes mellitus, with long-term current use of insulin ([facility]) 2019     Depression 2019     Hyperlipidemia 2019       LOS (days): 3  Geometric Mean LOS (GMLOS) (days): 4.4  Days to GMLOS:1.5     OBJECTIVE:  Risk of Unplanned Readmission Score: 16.05         Current admission status: Inpatient   Preferred Pharmacy:   [facility] [address] [address]  Phone: [phone] Fax: [phone]    Primary Care Provider: No primary care provider on file.    Primary Insurance: MEDICARE  Secondary Insurance: [employer] [place] [employer] [facility]    DISCHARGE DETAILS:    Other Referral/Resources/Interventions Provided:  Referral Comments: BLS  via [facility] confirmed for 1230 on Sun 3/2. PCS form printed and placed in label book for chart. Message sent in aidin to [email or web address] to  update. Attending physician and JIGNA Moser updated.

## 2025-03-01 NOTE — ASSESSMENT & PLAN NOTE
Lab Results   Component Value Date    HGBA1C 7.7 (H) 02/26/2025       Recent Labs     02/28/25  1549 02/28/25  2125 03/01/25  0728 03/01/25  1108   POCGLU 198* 241* 211* 182*       Blood Sugar Average: Last 72 hrs:  (P) 196.125

## 2025-03-02 LAB
ANION GAP SERPL CALCULATED.3IONS-SCNC: 7 MMOL/L (ref 4–13)
BASOPHILS # BLD AUTO: 0.03 THOUSANDS/ÂΜL (ref 0–0.1)
BASOPHILS NFR BLD AUTO: 1 % (ref 0–1)
BUN SERPL-MCNC: 17 MG/DL (ref 5–25)
CALCIUM SERPL-MCNC: 8.3 MG/DL (ref 8.4–10.2)
CHLORIDE SERPL-SCNC: 102 MMOL/L (ref 96–108)
CO2 SERPL-SCNC: 21 MMOL/L (ref 21–32)
CREAT SERPL-MCNC: 1 MG/DL (ref 0.6–1.3)
EOSINOPHIL # BLD AUTO: 0.27 THOUSAND/ÂΜL (ref 0–0.61)
EOSINOPHIL NFR BLD AUTO: 4 % (ref 0–6)
ERYTHROCYTE [DISTWIDTH] IN BLOOD BY AUTOMATED COUNT: 13.7 % (ref 11.6–15.1)
GFR SERPL CREATININE-BSD FRML MDRD: 48 ML/MIN/1.73SQ M
GLUCOSE SERPL-MCNC: 166 MG/DL (ref 65–140)
GLUCOSE SERPL-MCNC: 167 MG/DL (ref 65–140)
GLUCOSE SERPL-MCNC: 167 MG/DL (ref 65–140)
GLUCOSE SERPL-MCNC: 176 MG/DL (ref 65–140)
GLUCOSE SERPL-MCNC: 200 MG/DL (ref 65–140)
HCT VFR BLD AUTO: 22.9 % (ref 34.8–46.1)
HCT VFR BLD AUTO: 23.3 % (ref 34.8–46.1)
HGB BLD-MCNC: 7.1 G/DL (ref 11.5–15.4)
HGB BLD-MCNC: 7.3 G/DL (ref 11.5–15.4)
IMM GRANULOCYTES # BLD AUTO: 0.03 THOUSAND/UL (ref 0–0.2)
IMM GRANULOCYTES NFR BLD AUTO: 1 % (ref 0–2)
LYMPHOCYTES # BLD AUTO: 1.35 THOUSANDS/ÂΜL (ref 0.6–4.47)
LYMPHOCYTES NFR BLD AUTO: 21 % (ref 14–44)
MAGNESIUM SERPL-MCNC: 2 MG/DL (ref 1.9–2.7)
MCH RBC QN AUTO: 29.2 PG (ref 26.8–34.3)
MCHC RBC AUTO-ENTMCNC: 31.3 G/DL (ref 31.4–37.4)
MCV RBC AUTO: 93 FL (ref 82–98)
MONOCYTES # BLD AUTO: 0.49 THOUSAND/ÂΜL (ref 0.17–1.22)
MONOCYTES NFR BLD AUTO: 8 % (ref 4–12)
NEUTROPHILS # BLD AUTO: 4.34 THOUSANDS/ÂΜL (ref 1.85–7.62)
NEUTS SEG NFR BLD AUTO: 65 % (ref 43–75)
NRBC BLD AUTO-RTO: 0 /100 WBCS
PLATELET # BLD AUTO: 223 THOUSANDS/UL (ref 149–390)
PMV BLD AUTO: 9.7 FL (ref 8.9–12.7)
POTASSIUM SERPL-SCNC: 4.3 MMOL/L (ref 3.5–5.3)
RBC # BLD AUTO: 2.5 MILLION/UL (ref 3.81–5.12)
SODIUM SERPL-SCNC: 130 MMOL/L (ref 135–147)
WBC # BLD AUTO: 6.51 THOUSAND/UL (ref 4.31–10.16)

## 2025-03-02 PROCEDURE — 99232 SBSQ HOSP IP/OBS MODERATE 35: CPT | Performed by: FAMILY MEDICINE

## 2025-03-02 PROCEDURE — 85018 HEMOGLOBIN: CPT | Performed by: FAMILY MEDICINE

## 2025-03-02 PROCEDURE — 85025 COMPLETE CBC W/AUTO DIFF WBC: CPT | Performed by: FAMILY MEDICINE

## 2025-03-02 PROCEDURE — 80048 BASIC METABOLIC PNL TOTAL CA: CPT | Performed by: FAMILY MEDICINE

## 2025-03-02 PROCEDURE — 82948 REAGENT STRIP/BLOOD GLUCOSE: CPT

## 2025-03-02 PROCEDURE — 99024 POSTOP FOLLOW-UP VISIT: CPT | Performed by: PHYSICIAN ASSISTANT

## 2025-03-02 PROCEDURE — 83735 ASSAY OF MAGNESIUM: CPT | Performed by: FAMILY MEDICINE

## 2025-03-02 PROCEDURE — 85014 HEMATOCRIT: CPT | Performed by: FAMILY MEDICINE

## 2025-03-02 RX ORDER — SODIUM CHLORIDE 9 MG/ML
50 INJECTION, SOLUTION INTRAVENOUS CONTINUOUS
Status: DISCONTINUED | OUTPATIENT
Start: 2025-03-02 | End: 2025-03-03

## 2025-03-02 RX ADMIN — MELATONIN TAB 3 MG 4.5 MG: 3 TAB at 22:41

## 2025-03-02 RX ADMIN — PAROXETINE 40 MG: 20 TABLET, FILM COATED ORAL at 10:03

## 2025-03-02 RX ADMIN — MIRTAZAPINE 15 MG: 15 TABLET, FILM COATED ORAL at 22:41

## 2025-03-02 RX ADMIN — INSULIN LISPRO 1 UNITS: 100 INJECTION, SOLUTION INTRAVENOUS; SUBCUTANEOUS at 17:29

## 2025-03-02 RX ADMIN — SODIUM CHLORIDE 50 ML/HR: 0.9 INJECTION, SOLUTION INTRAVENOUS at 10:02

## 2025-03-02 RX ADMIN — Medication 250 MG: at 10:03

## 2025-03-02 RX ADMIN — ACETAMINOPHEN 975 MG: 325 TABLET ORAL at 22:41

## 2025-03-02 RX ADMIN — PANTOPRAZOLE SODIUM 40 MG: 40 TABLET, DELAYED RELEASE ORAL at 10:03

## 2025-03-02 RX ADMIN — ASPIRIN 81 MG CHEWABLE TABLET 81 MG: 81 TABLET CHEWABLE at 22:41

## 2025-03-02 RX ADMIN — IRON SUCROSE 200 MG: 20 INJECTION, SOLUTION INTRAVENOUS at 13:25

## 2025-03-02 RX ADMIN — LOSARTAN POTASSIUM 50 MG: 50 TABLET, FILM COATED ORAL at 10:13

## 2025-03-02 RX ADMIN — INSULIN LISPRO 1 UNITS: 100 INJECTION, SOLUTION INTRAVENOUS; SUBCUTANEOUS at 11:46

## 2025-03-02 RX ADMIN — Medication 400 MG: at 10:03

## 2025-03-02 RX ADMIN — DONEPEZIL HYDROCHLORIDE 5 MG: 5 TABLET ORAL at 22:41

## 2025-03-02 RX ADMIN — MEMANTINE 5 MG: 5 TABLET ORAL at 17:29

## 2025-03-02 RX ADMIN — MEMANTINE 5 MG: 5 TABLET ORAL at 10:03

## 2025-03-02 RX ADMIN — Medication 250 MG: at 17:29

## 2025-03-02 RX ADMIN — INSULIN LISPRO 1 UNITS: 100 INJECTION, SOLUTION INTRAVENOUS; SUBCUTANEOUS at 10:03

## 2025-03-02 RX ADMIN — ACETAMINOPHEN 975 MG: 325 TABLET ORAL at 14:27

## 2025-03-02 RX ADMIN — Medication 1000 UNITS: at 10:03

## 2025-03-02 RX ADMIN — ASPIRIN 81 MG CHEWABLE TABLET 81 MG: 81 TABLET CHEWABLE at 10:03

## 2025-03-02 NOTE — ASSESSMENT & PLAN NOTE
Sodium 130 this morning, corrected to 132 with glucose level of 176   Gentle IV fluid hydration ordered.

## 2025-03-02 NOTE — ASSESSMENT & PLAN NOTE
Hemoglobin 7.3 as noted above. Surgical dressing is dry. No evidence of acute bleeding. Repeat hemoglobin level this afternoon. Will administer IV iron.

## 2025-03-02 NOTE — CASE MANAGEMENT
Case Management Discharge Planning Note    Patient name Sabrina Barclay  Location [address] [place] [address]/[address] [place] [address] MRN [id number]  : 3/25/1930 Date 3/2/2025       Current Admission Date: 2025  Current Admission Diagnosis:Closed fracture of left hip (HCC)   Patient Active Problem List    Diagnosis Date Noted Date Diagnosed    Acute blood loss anemia 2025     History of hysterectomy 2025     Closed fracture of left hip (HCC) 2025     GERD (gastroesophageal reflux disease) 06/10/2024     Constipation 06/10/2024     Dementia ([facility])      CKD (chronic kidney disease) stage 3, GFR 30-59 ml/min ([facility])      Wound of right breast 2024     Status post fall 2019     Hyponatremia 2019     Benign essential hypertension 2019     Type 2 diabetes mellitus, with long-term current use of insulin ([facility]) 2019     Depression 2019     Hyperlipidemia 2019       LOS (days): 4  Geometric Mean LOS (GMLOS) (days): 4.4  Days to GMLOS:0.8     OBJECTIVE:  Risk of Unplanned Readmission Score: 18.6         Current admission status: Inpatient   Preferred Pharmacy:   [facility] [address] [address]  Phone: [phone] Fax: [phone]    Primary Care Provider: No primary care provider on file.    Primary Insurance: MEDICARE  Secondary Insurance: [facility]    DISCHARGE DETAILS:    Other Referral/Resources/Interventions Provided:  Referral Comments: BLS transport canceled for today as patient not medically stable for discharge. [email or web address] updated via aidin and attending physician updated son via phone.

## 2025-03-02 NOTE — ASSESSMENT & PLAN NOTE
POD#3 s/p short IMN left hip with Dr. Rosario  Perioperative antibiotics completed  DVT prophylaxis: Asa 81mg BID/SCD's/Ambulation  PT/OT- WBAT LLE  Analgesia PRN - mabel protocol  Follow up AM labs - 3g drop in hgb from preop, and 1g drop since yesterday.  No sign of hematoma in thigh or drainage on the dressings; agree with recheck of hemoglobin this afternoon and if stable, will be appropriate for discharge  Post op medical management per primary team  Dressing- monitor for drainage, Mepilex dsgs may remain in place 7 days  Discharge planning -discussed with internal medicine.  Patient is stable for discharge from orthopedic perspective.  Plan will be for discharge to a rehab facility. Follow up as an outpatient with Dr. Rosario 2 weeks post-op

## 2025-03-02 NOTE — ASSESSMENT & PLAN NOTE
Patient presented from [facility] after a unwitnessed fall  CT head, CT cervical spine negative for acute pathology  X-ray hips shows left hip fracture but official read is pending  Received TXA in the ER after discussion with Ortho on-call  Keep n.p.o. after midnight for surgical intervention in a.m. per Ortho  Cardiology consulted for preop clearance  Repeat lab work in a.m.  PT/OT once cleared by Ortho  2/27 - Plan for OR this evening with orthopedic surgery. Pain currently well controlled. 2D echo ordered by cardiology. Follow up results.  2/28 - Patient is s/p short IMN left hip yesterday with orthopedic surgery.  Follow-up with further recommendations from orthopedic surgery.  Physical therapy and Occupational Therapy consultations have been placed.  3/1 - Pain well controlled. Hemoglobin same today (8.1). Case management working on rehab placement. Plan to discharge to rehab tomorrow.  3/2 - Hemoglobin level 7.3 this morning. Surgical dressing is dry. No evidence of acute bleeding. Repeat hemoglobin level this afternoon. Will administer IV iron.

## 2025-03-02 NOTE — ASSESSMENT & PLAN NOTE
Lab Results   Component Value Date    EGFR 48 03/02/2025    EGFR 44 03/01/2025    EGFR 48 02/28/2025    CREATININE 1.00 03/02/2025    CREATININE 1.08 03/01/2025    CREATININE 1.00 02/28/2025

## 2025-03-02 NOTE — ASSESSMENT & PLAN NOTE
Lab Results   Component Value Date    HGBA1C 7.7 (H) 02/26/2025       Recent Labs     03/01/25  1625 03/01/25  2105 03/02/25  0728 03/02/25  1114   POCGLU 155* 181* 166* 167*       Blood Sugar Average: Last 72 hrs:  (P) 186.5

## 2025-03-02 NOTE — PROGRESS NOTES
Progress Note - Orthopedics   Name: Sabrina Barclay 89 y.o. female I MRN: [id number]  Unit/Bed#: [address] [place] [address] Date of Admission: 2/26/2025   Date of Service: 3/2/2025 I Hospital Day: 4    Assessment & Plan  Closed fracture of left hip (HCC)  POD#3 s/p short IMN left hip with Dr. Rosario  Perioperative antibiotics completed  DVT prophylaxis: Asa 81mg BID/SCD's/Ambulation  PT/OT- WBAT LLE  Analgesia PRN - mabel protocol  Follow up AM labs - 3g drop in hgb from preop, and 1g drop since yesterday.  No sign of hematoma in thigh or drainage on the dressings; agree with recheck of hemoglobin this afternoon and if stable, will be appropriate for discharge  Post op medical management per primary team  Dressing- monitor for drainage, Mepilex dsgs may remain in place 7 days  Discharge planning -discussed with internal medicine.  Patient is stable for discharge from orthopedic perspective.  Plan will be for discharge to a rehab facility. Follow up as an outpatient with Dr. Rosario 2 weeks post-op    Benign essential hypertension    Type 2 diabetes mellitus, with long-term current use of insulin ([facility])  Lab Results   Component Value Date    HGBA1C 7.7 (H) 02/26/2025       Recent Labs     03/01/25  1625 03/01/25  2105 03/02/25  0728 03/02/25  1114   POCGLU 155* 181* 166* 167*       Blood Sugar Average: Last 72 hrs:  (P) 186.5    Depression    Dementia ([facility])    CKD (chronic kidney disease) stage 3, GFR 30-59 ml/min ([facility])  Lab Results   Component Value Date    EGFR 48 03/02/2025    EGFR 44 03/01/2025    EGFR 48 02/28/2025    CREATININE 1.00 03/02/2025    CREATININE 1.08 03/01/2025    CREATININE 1.00 02/28/2025     History of hysterectomy    Acute blood loss anemia      Medical co-morbidities include HTN, DM2, dementia, CKD stage 3, which are being managed per primary team. Ok for discharge from Orthopedics service perspective.    Subjective   89 y.o.female POD#3. No acute events, no new complaints. Pain well controlled. Denies  fevers, chills, CP, SOB, N/V, numbness or tingling. Patient reports no issues with urination or bowel movements. Patient does not remember having surgery    Objective :  Temp:  [98 °F (36.7 °C)-98.4 °F (36.9 °C)] 98.4 °F (36.9 °C)  HR:  [77-91] 77  BP: (102-144)/(48-71) 144/71  Resp:  [17-20] 18  SpO2:  [88 %-93 %] 92 %    Physical Exam  Vitals and nursing note reviewed.   Constitutional:       Appearance: Normal appearance. She is well-developed.      Comments: Body mass index is 25.4 kg/m².   HENT:      Head: Normocephalic and atraumatic.      Right Ear: External ear normal.      Left Ear: External ear normal.   Eyes:      Extraocular Movements: Extraocular movements intact.      Conjunctiva/sclera: Conjunctivae normal.   Cardiovascular:      Rate and Rhythm: Normal rate.      Pulses: Normal pulses.   Pulmonary:      Effort: Pulmonary effort is normal.   Abdominal:      Palpations: Abdomen is soft.   Musculoskeletal:      Cervical back: Normal range of motion.      Comments: See ortho exam   Skin:     General: Skin is warm and dry.   Neurological:      General: No focal deficit present.      Mental Status: She is alert and oriented to person, place, and time. Mental status is at baseline.   Psychiatric:         Mood and Affect: Mood normal.         Behavior: Behavior normal.         Thought Content: Thought content normal.         Judgment: Judgment normal.       Musculoskeletal: Left hip  Skin intact . No erythema or ecchymosis.  No edema or significant hematoma left hip  Dressings lateral hip mepilex dsgs c/d/i  Motor intact to +FHL/EHL, +ankle dorsi/plantar flexion  Sensation intact to saphenous, sural, tibial, superficial peroneal nerve, and deep peroneal  2+ DP pulse  No calf swelling or tenderness to palpation      Lab Results: I have reviewed the following results:  Recent Labs     02/28/25  0631 03/01/25  0443 03/02/25  0627   WBC 7.26 8.59 6.51   HGB 8.1* 8.1* 7.3*   HCT 26.0* 26.1* 23.3*    247  223   BUN 17 18 17   CREATININE 1.00 1.08 1.00

## 2025-03-02 NOTE — PLAN OF CARE
Problem: PAIN - ADULT  Goal: Verbalizes/displays adequate comfort level or baseline comfort level  Description: Interventions:  - Encourage patient to monitor pain and request assistance  - Assess pain using appropriate pain scale  - Administer analgesics based on type and severity of pain and evaluate response  - Implement non-pharmacological measures as appropriate and evaluate response  - Consider cultural and social influences on pain and pain management  - Notify physician/advanced practitioner if interventions unsuccessful or patient reports new pain  Outcome: Progressing     Problem: INFECTION - ADULT  Goal: Absence or prevention of progression during hospitalization  Description: INTERVENTIONS:  - Assess and monitor for signs and symptoms of infection  - Monitor lab/diagnostic results  - Monitor all insertion sites, i.e. indwelling lines, tubes, and drains  - Monitor endotracheal if appropriate and nasal secretions for changes in amount and color  - [place] appropriate cooling/warming therapies per order  - Administer medications as ordered  - Instruct and encourage patient and family to use good hand hygiene technique  - Identify and instruct in appropriate isolation precautions for identified infection/condition  Outcome: Progressing  Goal: Absence of fever/infection during neutropenic period  Description: INTERVENTIONS:  - Monitor WBC    Outcome: Progressing     Problem: SAFETY ADULT  Goal: Patient will remain free of falls  Description: INTERVENTIONS:  - Educate patient/family on patient safety including physical limitations  - Instruct patient to call for assistance with activity   - Consult OT/PT to assist with strengthening/mobility   - Keep Call bell within reach  - Keep bed low and locked with side rails adjusted as appropriate  - Keep care items and personal belongings within reach  - Initiate and maintain comfort rounds  - Make Fall Risk Sign visible to staff  - Offer Toileting every 2 Hours,  in advance of need  - Initiate/Maintain bed alarm  - Obtain necessary fall risk management equipment: call bell   - Apply yellow socks and bracelet for high fall risk patients  - Consider moving patient to room near nurses station  Outcome: Progressing  Goal: Maintain or return to baseline ADL function  Description: INTERVENTIONS:  -  Assess patient's ability to carry out ADLs; assess patient's baseline for ADL function and identify physical deficits which impact ability to perform ADLs (bathing, care of mouth/teeth, toileting, grooming, dressing, etc.)  - Assess/evaluate cause of self-care deficits   - Assess range of motion  - Assess patient's mobility; develop plan if impaired  - Assess patient's need for assistive devices and provide as appropriate  - Encourage maximum independence but intervene and supervise when necessary  - Involve family in performance of ADLs  - Assess for home care needs following discharge   - Consider OT consult to assist with ADL evaluation and planning for discharge  - Provide patient education as appropriate  Outcome: Progressing  Goal: Maintains/Returns to pre admission functional level  Description: INTERVENTIONS:  - Perform AM-PAC 6 Click Basic Mobility/ Daily Activity assessment daily.  - Set and communicate daily mobility goal to care team and patient/family/caregiver.   - Collaborate with rehabilitation services on mobility goals if consulted  - Perform Range of Motion 3 times a day.  - Reposition patient every 3 hours.  - Dangle patient 3 times a day  - Stand patient 3 times a day  - Ambulate patient 3 times a day  - Out of bed to chair 3 times a day   - Out of bed for meals 3 times a day  - Out of bed for toileting  - Record patient progress and toleration of activity level   Outcome: Progressing     Problem: DISCHARGE PLANNING  Goal: Discharge to home or other facility with appropriate resources  Description: INTERVENTIONS:  - Identify barriers to discharge w/patient and  caregiver  - Arrange for needed discharge resources and transportation as appropriate  - Identify discharge learning needs (meds, wound care, etc.)  - Arrange for interpretive services to assist at discharge as needed  - Refer to Case Management Department for coordinating discharge planning if the patient needs post-hospital services based on physician/advanced practitioner order or complex needs related to functional status, cognitive ability, or social support system  Outcome: Progressing     Problem: Knowledge Deficit  Goal: Patient/family/caregiver demonstrates understanding of disease process, treatment plan, medications, and discharge instructions  Description: Complete learning assessment and assess knowledge base.  Interventions:  - Provide teaching at level of understanding  - Provide teaching via preferred learning methods  Outcome: Progressing     Problem: Nutrition/Hydration-ADULT  Goal: Nutrient/Hydration intake appropriate for improving, restoring or maintaining nutritional needs  Description: Monitor and assess patient's nutrition/hydration status for malnutrition. Collaborate with interdisciplinary team and initiate plan and interventions as ordered.  Monitor patient's weight and dietary intake as ordered or per policy. Utilize nutrition screening tool and intervene as necessary. Determine patient's food preferences and provide high-protein, high-caloric foods as appropriate.     INTERVENTIONS:  - Monitor oral intake, urinary output, labs, and treatment plans  - Assess nutrition and hydration status and recommend course of action  - Evaluate amount of meals eaten  - Assist patient with eating if necessary   - Allow adequate time for meals  - Recommend/ encourage appropriate diets, oral nutritional supplements, and vitamin/mineral supplements  - Order, calculate, and assess calorie counts as needed  - Recommend, monitor, and adjust tube feedings and TPN/PPN based on assessed needs  - Assess need for  intravenous fluids  - Provide specific nutrition/hydration education as appropriate  - Include patient/family/caregiver in decisions related to nutrition  Outcome: Progressing     Problem: Prexisting or High Potential for Compromised Skin Integrity  Goal: Skin integrity is maintained or improved  Description: INTERVENTIONS:  - Identify patients at risk for skin breakdown  - Assess and monitor skin integrity  - Assess and monitor nutrition and hydration status  - Monitor labs   - Assess for incontinence   - Turn and reposition patient  - Assist with mobility/ambulation  - Relieve pressure over bony prominences  - Avoid friction and shearing  - Provide appropriate hygiene as needed including keeping skin clean and dry  - Evaluate need for skin moisturizer/barrier cream  - Collaborate with interdisciplinary team   - Patient/family teaching  - Consider wound care consult   Outcome: Progressing     Problem: Potential for Falls  Goal: Patient will remain free of falls  Description: INTERVENTIONS:  - Educate patient/family on patient safety including physical limitations  - Instruct patient to call for assistance with activity   - Consult OT/PT to assist with strengthening/mobility   - Keep Call bell within reach  - Keep bed low and locked with side rails adjusted as appropriate  - Keep care items and personal belongings within reach  - Initiate and maintain comfort rounds  - Make Fall Risk Sign visible to staff  - Offer Toileting every 2 Hours, in advance of need  - Initiate/Maintain bed alarm  - Obtain necessary fall risk management equipment: call bell   - Apply yellow socks and bracelet for high fall risk patients  - Consider moving patient to room near nurses station  Outcome: Progressing

## 2025-03-02 NOTE — ASSESSMENT & PLAN NOTE
Lab Results   Component Value Date    HGBA1C 7.7 (H) 02/26/2025       Recent Labs     03/01/25  1625 03/01/25  2105 03/02/25  0728 03/02/25  1114   POCGLU 155* 181* 166* 167*       Continue lantus 10 units. Hemoglobin A1c 7.7.  Continue insulin sliding scale.

## 2025-03-02 NOTE — ASSESSMENT & PLAN NOTE
Lab Results   Component Value Date    EGFR 48 03/02/2025    EGFR 44 03/01/2025    EGFR 48 02/28/2025    CREATININE 1.00 03/02/2025    CREATININE 1.08 03/01/2025    CREATININE 1.00 02/28/2025     Creatinine 1.00. Will continue to monitor.

## 2025-03-02 NOTE — PLAN OF CARE
Problem: PAIN - ADULT  Goal: Verbalizes/displays adequate comfort level or baseline comfort level  Description: Interventions:  - Encourage patient to monitor pain and request assistance  - Assess pain using appropriate pain scale  - Administer analgesics based on type and severity of pain and evaluate response  - Implement non-pharmacological measures as appropriate and evaluate response  - Consider cultural and social influences on pain and pain management  - Notify physician/advanced practitioner if interventions unsuccessful or patient reports new pain  Outcome: Progressing     Problem: INFECTION - ADULT  Goal: Absence or prevention of progression during hospitalization  Description: INTERVENTIONS:  - Assess and monitor for signs and symptoms of infection  - Monitor lab/diagnostic results  - Monitor all insertion sites, i.e. indwelling lines, tubes, and drains  - Monitor endotracheal if appropriate and nasal secretions for changes in amount and color  - [place] appropriate cooling/warming therapies per order  - Administer medications as ordered  - Instruct and encourage patient and family to use good hand hygiene technique  - Identify and instruct in appropriate isolation precautions for identified infection/condition  Outcome: Progressing  Goal: Absence of fever/infection during neutropenic period  Description: INTERVENTIONS:  - Monitor WBC    Outcome: Progressing

## 2025-03-02 NOTE — PROGRESS NOTES
Progress Note - Hospitalist   Name: Sabrina Barclay 89 y.o. female I MRN: [id number]  Unit/Bed#: 2 [address] Date of Admission: 2/26/2025   Date of Service: 3/2/2025 I Hospital Day: 4     Assessment & Plan  Closed fracture of left hip (HCC)  Patient presented from [facility] after a unwitnessed fall  CT head, CT cervical spine negative for acute pathology  X-ray hips shows left hip fracture but official read is pending  Received TXA in the ER after discussion with Ortho on-call  Keep n.p.o. after midnight for surgical intervention in a.m. per Ortho  Cardiology consulted for preop clearance  Repeat lab work in a.m.  PT/OT once cleared by Ortho  2/27 - Plan for OR this evening with orthopedic surgery. Pain currently well controlled. 2D echo ordered by cardiology. Follow up results.  2/28 - Patient is s/p short IMN left hip yesterday with orthopedic surgery.  Follow-up with further recommendations from orthopedic surgery.  Physical therapy and Occupational Therapy consultations have been placed.  3/1 - Pain well controlled. Hemoglobin same today (8.1). Case management working on rehab placement. Plan to discharge to rehab tomorrow.  3/2 - Hemoglobin level 7.3 this morning. Surgical dressing is dry. No evidence of acute bleeding. Repeat hemoglobin level this afternoon. Will administer IV iron.  Benign essential hypertension  Continue Cozaar and monitor blood pressures  Type 2 diabetes mellitus, with long-term current use of insulin ([facility])  Lab Results   Component Value Date    HGBA1C 7.7 (H) 02/26/2025       Recent Labs     03/01/25  1625 03/01/25  2105 03/02/25  0728 03/02/25  1114   POCGLU 155* 181* 166* 167*       Continue lantus 10 units. Hemoglobin A1c 7.7.  Continue insulin sliding scale.    Dementia ([facility])  Continue Namenda Aricept  CKD (chronic kidney disease) stage 3, GFR 30-59 ml/min ([facility])  Lab Results   Component Value Date    EGFR 48 03/02/2025    EGFR 44 03/01/2025    EGFR 48 02/28/2025    CREATININE 1.00  03/02/2025    CREATININE 1.08 03/01/2025    CREATININE 1.00 02/28/2025     Creatinine 1.00. Will continue to monitor.  Depression  Continue Paxil, Remeron  History of hysterectomy    Acute blood loss anemia  Hemoglobin 7.3 as noted above. Surgical dressing is dry. No evidence of acute bleeding. Repeat hemoglobin level this afternoon. Will administer IV iron.  Hyponatremia  Sodium 130 this morning, corrected to 132 with glucose level of 176   Gentle IV fluid hydration ordered.  VTE Pharmacologic Prophylaxis: VTE Score: 11 Aspirin 81mg twice daily.    Mobility:   Basic Mobility Inpatient Raw Score: 12  -HLM Goal: 4: Move to chair/commode  -HLM Achieved: 4: Move to chair/commode    Patient Centered Rounds: I performed bedside rounds with nursing staff today.     Education and Discussions with Family / Patient: Discussed with patient's son (Miguel Angel) on the phone.    Current Length of Stay: 4 day(s)  Current Patient Status: Inpatient   Certification Statement: The patient will continue to require additional inpatient hospital stay due to above.  Discharge Plan: TBD. Likely rehab.    Code Status: Level 3 - DNAR and DNI    Subjective   Patient was seen and examined at bedside. No acute events overnight. No new complaints.    Objective :  Temp:  [98 °F (36.7 °C)-98.4 °F (36.9 °C)] 98.4 °F (36.9 °C)  HR:  [77-91] 77  BP: (102-144)/(48-71) 144/71  Resp:  [17-20] 18  SpO2:  [88 %-93 %] 92 %    Body mass index is 25.4 kg/m².     Input and Output Summary (last 24 hours):   No intake or output data in the 24 hours ending 03/02/25 1202    Physical Exam  HENT:      Head: Normocephalic.      Mouth/Throat:      Mouth: Mucous membranes are moist.   Eyes:      Extraocular Movements: Extraocular movements intact.   Cardiovascular:      Rate and Rhythm: Normal rate.   Pulmonary:      Effort: Pulmonary effort is normal. No respiratory distress.   Abdominal:      Palpations: Abdomen is soft.      Tenderness: There is no abdominal  tenderness.   Skin:     General: Skin is warm.   Neurological:      Mental Status: She is alert and oriented to person, place, and time.   Psychiatric:         Mood and Affect: Mood normal.       Lines/Drains:  Lines/Drains/Airways       Active Status       Name Placement date Placement time Site Days    External Urinary Catheter 02/27/25  0600  -- 3                    Lab Results: I have reviewed the following results:   Results from last 7 days   Lab Units 03/02/25  0627   WBC Thousand/uL 6.51   HEMOGLOBIN g/dL 7.3*   HEMATOCRIT % 23.3*   PLATELETS Thousands/uL 223   SEGS PCT % 65   LYMPHO PCT % 21   MONO PCT % 8   EOS PCT % 4     Results from last 7 days   Lab Units 03/02/25  0627 02/27/25  0635 02/26/25  1821   SODIUM mmol/L 130*   < > 134*   POTASSIUM mmol/L 4.3   < > 4.2   CHLORIDE mmol/L 102   < > 102   CO2 mmol/L 21   < > 22   BUN mg/dL 17   < > 15   CREATININE mg/dL 1.00   < > 1.00   ANION GAP mmol/L 7   < > 10   CALCIUM mg/dL 8.3*   < > 9.6   ALBUMIN g/dL  --   --  3.9   TOTAL BILIRUBIN mg/dL  --   --  0.28   ALK PHOS U/L  --   --  87   ALT U/L  --   --  8   AST U/L  --   --  13   GLUCOSE RANDOM mg/dL 176*   < > 128    < > = values in this interval not displayed.     Results from last 7 days   Lab Units 02/27/25  0635   INR  1.04     Results from last 7 days   Lab Units 03/02/25  1114 03/02/25  0728 03/01/25  2105 03/01/25  1625 03/01/25  1108 03/01/25  0728 02/28/25  2125 02/28/25  1549 02/28/25  1124 02/27/25  2042 02/27/25  1133 02/27/25  0719   POC GLUCOSE mg/dl 167* 166* 181* 155* 182* 211* 241* 198* 216* 225* 151* 145*     Results from last 7 days   Lab Units 02/26/25  1821   HEMOGLOBIN A1C % 7.7*     Last 24 Hours Medication List:     Current Facility-Administered Medications:     acetaminophen (TYLENOL) tablet 975 mg, Q8H JAI    aspirin chewable tablet 81 mg, BID    Cholecalciferol (VITAMIN D3) tablet 1,000 Units, Daily    donepezil (ARICEPT) tablet 5 mg, HS    [Held by provider] insulin glargine  (LANTUS) subcutaneous injection 10 Units 0.1 mL, QAM    insulin lispro (HumALOG/ADMELOG) 100 units/mL subcutaneous injection 1-5 Units, TID AC **AND** Fingerstick Glucose (POCT), TID AC    losartan (COZAAR) tablet 50 mg, Daily    magnesium Oxide (MAG-OX) tablet 400 mg, Daily    melatonin tablet 4.5 mg, HS    memantine (NAMENDA) tablet 5 mg, BID    mirtazapine (REMERON) tablet 15 mg, HS    oxyCODONE (ROXICODONE) IR tablet 5 mg, Q6H PRN    oxyCODONE (ROXICODONE) split tablet 2.5 mg, Q6H PRN    pantoprazole (PROTONIX) EC tablet 40 mg, Daily    PARoxetine (PAXIL) tablet 40 mg, QAM    saccharomyces boulardii (FLORASTOR) capsule 250 mg, BID    sodium chloride 0.9 % infusion, Continuous, Last Rate: 50 mL/hr (03/02/25 1002)    Administrative Statements   Today, Patient Was Seen By: Gildardo Gunderson MD    **Please Note: This note may have been constructed using a voice recognition system.**

## 2025-03-03 VITALS
RESPIRATION RATE: 17 BRPM | SYSTOLIC BLOOD PRESSURE: 132 MMHG | OXYGEN SATURATION: 94 % | HEART RATE: 78 BPM | BODY MASS INDEX: 25.27 KG/M2 | TEMPERATURE: 97.6 F | DIASTOLIC BLOOD PRESSURE: 62 MMHG | HEIGHT: 64 IN | WEIGHT: 148 LBS

## 2025-03-03 LAB
ANION GAP SERPL CALCULATED.3IONS-SCNC: 7 MMOL/L (ref 4–13)
BASOPHILS # BLD AUTO: 0.03 THOUSANDS/ÂΜL (ref 0–0.1)
BASOPHILS NFR BLD AUTO: 1 % (ref 0–1)
BUN SERPL-MCNC: 16 MG/DL (ref 5–25)
CALCIUM SERPL-MCNC: 8.5 MG/DL (ref 8.4–10.2)
CHLORIDE SERPL-SCNC: 105 MMOL/L (ref 96–108)
CO2 SERPL-SCNC: 22 MMOL/L (ref 21–32)
CREAT SERPL-MCNC: 0.88 MG/DL (ref 0.6–1.3)
EOSINOPHIL # BLD AUTO: 0.34 THOUSAND/ÂΜL (ref 0–0.61)
EOSINOPHIL NFR BLD AUTO: 6 % (ref 0–6)
ERYTHROCYTE [DISTWIDTH] IN BLOOD BY AUTOMATED COUNT: 13.9 % (ref 11.6–15.1)
GFR SERPL CREATININE-BSD FRML MDRD: 56 ML/MIN/1.73SQ M
GLUCOSE SERPL-MCNC: 158 MG/DL (ref 65–140)
GLUCOSE SERPL-MCNC: 161 MG/DL (ref 65–140)
GLUCOSE SERPL-MCNC: 180 MG/DL (ref 65–140)
GLUCOSE SERPL-MCNC: 189 MG/DL (ref 65–140)
HCT VFR BLD AUTO: 23.3 % (ref 34.8–46.1)
HGB BLD-MCNC: 7.2 G/DL (ref 11.5–15.4)
IMM GRANULOCYTES # BLD AUTO: 0.03 THOUSAND/UL (ref 0–0.2)
IMM GRANULOCYTES NFR BLD AUTO: 1 % (ref 0–2)
LYMPHOCYTES # BLD AUTO: 1.65 THOUSANDS/ÂΜL (ref 0.6–4.47)
LYMPHOCYTES NFR BLD AUTO: 28 % (ref 14–44)
MAGNESIUM SERPL-MCNC: 1.9 MG/DL (ref 1.9–2.7)
MCH RBC QN AUTO: 29.3 PG (ref 26.8–34.3)
MCHC RBC AUTO-ENTMCNC: 30.9 G/DL (ref 31.4–37.4)
MCV RBC AUTO: 95 FL (ref 82–98)
MONOCYTES # BLD AUTO: 0.51 THOUSAND/ÂΜL (ref 0.17–1.22)
MONOCYTES NFR BLD AUTO: 9 % (ref 4–12)
NEUTROPHILS # BLD AUTO: 3.45 THOUSANDS/ÂΜL (ref 1.85–7.62)
NEUTS SEG NFR BLD AUTO: 55 % (ref 43–75)
NRBC BLD AUTO-RTO: 0 /100 WBCS
PLATELET # BLD AUTO: 265 THOUSANDS/UL (ref 149–390)
PMV BLD AUTO: 10.8 FL (ref 8.9–12.7)
POTASSIUM SERPL-SCNC: 4.1 MMOL/L (ref 3.5–5.3)
RBC # BLD AUTO: 2.46 MILLION/UL (ref 3.81–5.12)
SODIUM SERPL-SCNC: 134 MMOL/L (ref 135–147)
WBC # BLD AUTO: 6.01 THOUSAND/UL (ref 4.31–10.16)

## 2025-03-03 PROCEDURE — 97110 THERAPEUTIC EXERCISES: CPT

## 2025-03-03 PROCEDURE — 80048 BASIC METABOLIC PNL TOTAL CA: CPT | Performed by: FAMILY MEDICINE

## 2025-03-03 PROCEDURE — 97530 THERAPEUTIC ACTIVITIES: CPT

## 2025-03-03 PROCEDURE — 83735 ASSAY OF MAGNESIUM: CPT | Performed by: FAMILY MEDICINE

## 2025-03-03 PROCEDURE — 99238 HOSP IP/OBS DSCHRG MGMT 30/<: CPT | Performed by: FAMILY MEDICINE

## 2025-03-03 PROCEDURE — 82948 REAGENT STRIP/BLOOD GLUCOSE: CPT

## 2025-03-03 PROCEDURE — 85025 COMPLETE CBC W/AUTO DIFF WBC: CPT | Performed by: FAMILY MEDICINE

## 2025-03-03 RX ORDER — ACETAMINOPHEN 325 MG/1
975 TABLET ORAL EVERY 8 HOURS SCHEDULED
Start: 2025-03-03 | End: 2025-03-08

## 2025-03-03 RX ORDER — INSULIN LISPRO 100 [IU]/ML
1-5 INJECTION, SOLUTION INTRAVENOUS; SUBCUTANEOUS
Start: 2025-03-03

## 2025-03-03 RX ORDER — ASPIRIN 81 MG/1
81 TABLET, CHEWABLE ORAL 2 TIMES DAILY
Start: 2025-03-03

## 2025-03-03 RX ORDER — FERROUS SULFATE 324(65)MG
324 TABLET, DELAYED RELEASE (ENTERIC COATED) ORAL
Start: 2025-03-03

## 2025-03-03 RX ADMIN — Medication 1000 UNITS: at 09:58

## 2025-03-03 RX ADMIN — Medication 250 MG: at 17:38

## 2025-03-03 RX ADMIN — ACETAMINOPHEN 975 MG: 325 TABLET ORAL at 15:00

## 2025-03-03 RX ADMIN — LOSARTAN POTASSIUM 50 MG: 50 TABLET, FILM COATED ORAL at 09:58

## 2025-03-03 RX ADMIN — IRON SUCROSE 200 MG: 20 INJECTION, SOLUTION INTRAVENOUS at 09:58

## 2025-03-03 RX ADMIN — Medication 250 MG: at 09:58

## 2025-03-03 RX ADMIN — ASPIRIN 81 MG CHEWABLE TABLET 81 MG: 81 TABLET CHEWABLE at 09:58

## 2025-03-03 RX ADMIN — MEMANTINE 5 MG: 5 TABLET ORAL at 09:58

## 2025-03-03 RX ADMIN — INSULIN LISPRO 1 UNITS: 100 INJECTION, SOLUTION INTRAVENOUS; SUBCUTANEOUS at 12:34

## 2025-03-03 RX ADMIN — Medication 400 MG: at 09:58

## 2025-03-03 RX ADMIN — PANTOPRAZOLE SODIUM 40 MG: 40 TABLET, DELAYED RELEASE ORAL at 09:58

## 2025-03-03 RX ADMIN — INSULIN LISPRO 1 UNITS: 100 INJECTION, SOLUTION INTRAVENOUS; SUBCUTANEOUS at 09:59

## 2025-03-03 RX ADMIN — PAROXETINE 40 MG: 20 TABLET, FILM COATED ORAL at 09:58

## 2025-03-03 RX ADMIN — INSULIN GLARGINE 10 UNITS: 100 INJECTION, SOLUTION SUBCUTANEOUS at 09:58

## 2025-03-03 RX ADMIN — MEMANTINE 5 MG: 5 TABLET ORAL at 17:38

## 2025-03-03 RX ADMIN — INSULIN LISPRO 1 UNITS: 100 INJECTION, SOLUTION INTRAVENOUS; SUBCUTANEOUS at 17:38

## 2025-03-03 NOTE — ASSESSMENT & PLAN NOTE
Lab Results   Component Value Date    HGBA1C 7.7 (H) 02/26/2025       Recent Labs     03/02/25  2216 03/03/25  0725 03/03/25  1119 03/03/25  1551   POCGLU 167* 161* 189* 180*       Continue lantus 10 units. Hemoglobin A1c 7.7.  Continue insulin sliding scale.    - Resume home medications on discharge

## 2025-03-03 NOTE — PLAN OF CARE
Problem: PAIN - ADULT  Goal: Verbalizes/displays adequate comfort level or baseline comfort level  Description: Interventions:  - Encourage patient to monitor pain and request assistance  - Assess pain using appropriate pain scale  - Administer analgesics based on type and severity of pain and evaluate response  - Implement non-pharmacological measures as appropriate and evaluate response  - Consider cultural and social influences on pain and pain management  - Notify physician/advanced practitioner if interventions unsuccessful or patient reports new pain  Outcome: Progressing     Problem: INFECTION - ADULT  Goal: Absence or prevention of progression during hospitalization  Description: INTERVENTIONS:  - Assess and monitor for signs and symptoms of infection  - Monitor lab/diagnostic results  - Monitor all insertion sites, i.e. indwelling lines, tubes, and drains  - Monitor endotracheal if appropriate and nasal secretions for changes in amount and color  - [place] appropriate cooling/warming therapies per order  - Administer medications as ordered  - Instruct and encourage patient and family to use good hand hygiene technique  - Identify and instruct in appropriate isolation precautions for identified infection/condition  Outcome: Progressing  Goal: Absence of fever/infection during neutropenic period  Description: INTERVENTIONS:  - Monitor WBC    Outcome: Progressing     Problem: SAFETY ADULT  Goal: Patient will remain free of falls  Description: INTERVENTIONS:  - Educate patient/family on patient safety including physical limitations  - Instruct patient to call for assistance with activity   - Consult OT/PT to assist with strengthening/mobility   - Keep Call bell within reach  - Keep bed low and locked with side rails adjusted as appropriate  - Keep care items and personal belongings within reach  - Initiate and maintain comfort rounds  - Make Fall Risk Sign visible to staff  - Offer Toileting every 2 Hours,  in advance of need  - Initiate/Maintain bed/chair alarm  - Obtain necessary fall risk management equipment: bed/chair alarm  - Apply yellow socks and bracelet for high fall risk patients  - Consider moving patient to room near nurses station  Outcome: Progressing  Goal: Maintain or return to baseline ADL function  Description: INTERVENTIONS:  -  Assess patient's ability to carry out ADLs; assess patient's baseline for ADL function and identify physical deficits which impact ability to perform ADLs (bathing, care of mouth/teeth, toileting, grooming, dressing, etc.)  - Assess/evaluate cause of self-care deficits   - Assess range of motion  - Assess patient's mobility; develop plan if impaired  - Assess patient's need for assistive devices and provide as appropriate  - Encourage maximum independence but intervene and supervise when necessary  - Involve family in performance of ADLs  - Assess for home care needs following discharge   - Consider OT consult to assist with ADL evaluation and planning for discharge  - Provide patient education as appropriate  Outcome: Progressing  Goal: Maintains/Returns to pre admission functional level  Description: INTERVENTIONS:  - Perform AM-PAC 6 Click Basic Mobility/ Daily Activity assessment daily.  - Set and communicate daily mobility goal to care team and patient/family/caregiver.   - Collaborate with rehabilitation services on mobility goals if consulted  - Perform Range of Motion 3 times a day.  - Reposition patient every 3 hours.  - Dangle patient 3 times a day  - Stand patient 3 times a day  - Ambulate patient 3 times a day  - Out of bed to chair 3 times a day   - Out of bed for meals 3 times a day  - Out of bed for toileting  - Record patient progress and toleration of activity level   Outcome: Progressing     Problem: DISCHARGE PLANNING  Goal: Discharge to home or other facility with appropriate resources  Description: INTERVENTIONS:  - Identify barriers to discharge  w/patient and caregiver  - Arrange for needed discharge resources and transportation as appropriate  - Identify discharge learning needs (meds, wound care, etc.)  - Arrange for interpretive services to assist at discharge as needed  - Refer to Case Management Department for coordinating discharge planning if the patient needs post-hospital services based on physician/advanced practitioner order or complex needs related to functional status, cognitive ability, or social support system  Outcome: Progressing     Problem: Knowledge Deficit  Goal: Patient/family/caregiver demonstrates understanding of disease process, treatment plan, medications, and discharge instructions  Description: Complete learning assessment and assess knowledge base.  Interventions:  - Provide teaching at level of understanding  - Provide teaching via preferred learning methods  Outcome: Progressing     Problem: Nutrition/Hydration-ADULT  Goal: Nutrient/Hydration intake appropriate for improving, restoring or maintaining nutritional needs  Description: Monitor and assess patient's nutrition/hydration status for malnutrition. Collaborate with interdisciplinary team and initiate plan and interventions as ordered.  Monitor patient's weight and dietary intake as ordered or per policy. Utilize nutrition screening tool and intervene as necessary. Determine patient's food preferences and provide high-protein, high-caloric foods as appropriate.     INTERVENTIONS:  - Monitor oral intake, urinary output, labs, and treatment plans  - Assess nutrition and hydration status and recommend course of action  - Evaluate amount of meals eaten  - Assist patient with eating if necessary   - Allow adequate time for meals  - Recommend/ encourage appropriate diets, oral nutritional supplements, and vitamin/mineral supplements  - Order, calculate, and assess calorie counts as needed  - Recommend, monitor, and adjust tube feedings and TPN/PPN based on assessed needs  -  Assess need for intravenous fluids  - Provide specific nutrition/hydration education as appropriate  - Include patient/family/caregiver in decisions related to nutrition  Outcome: Progressing     Problem: Prexisting or High Potential for Compromised Skin Integrity  Goal: Skin integrity is maintained or improved  Description: INTERVENTIONS:  - Identify patients at risk for skin breakdown  - Assess and monitor skin integrity  - Assess and monitor nutrition and hydration status  - Monitor labs   - Assess for incontinence   - Turn and reposition patient  - Assist with mobility/ambulation  - Relieve pressure over bony prominences  - Avoid friction and shearing  - Provide appropriate hygiene as needed including keeping skin clean and dry  - Evaluate need for skin moisturizer/barrier cream  - Collaborate with interdisciplinary team   - Patient/family teaching  - Consider wound care consult   Outcome: Progressing     Problem: Potential for Falls  Goal: Patient will remain free of falls  Description: INTERVENTIONS:  - Educate patient/family on patient safety including physical limitations  - Instruct patient to call for assistance with activity   - Consult OT/PT to assist with strengthening/mobility   - Keep Call bell within reach  - Keep bed low and locked with side rails adjusted as appropriate  - Keep care items and personal belongings within reach  - Initiate and maintain comfort rounds  - Make Fall Risk Sign visible to staff  - Offer Toileting every 2 Hours, in advance of need  - Initiate/Maintain bed/chair alarm  - Obtain necessary fall risk management equipment: bed/chair alarm  - Apply yellow socks and bracelet for high fall risk patients  - Consider moving patient to room near nurses station  Outcome: Progressing

## 2025-03-03 NOTE — DISCHARGE INSTR - AVS FIRST PAGE
Please continue taking all medications as prescribed. Repeat CBC in a few days. Please follow up with your primary medical doctor within 1 week of discharge. Please follow up with Dr. Rosario orthopedic surgery within 2 weeks of discharge. Please return to the nearest emergency department if you develop any signs or symptoms of worsening disease or infection, including but not limited to chest pain, shortness of breath, abdominal pain, lightheadedness, dizziness, palpitations, fevers or chills.

## 2025-03-03 NOTE — CASE MANAGEMENT
Case Management Discharge Planning Note    Patient name Sabrina Barclay  Location [facility] MRN [id number]  : 3/25/1930 Date 3/3/2025       Current Admission Date: 2025  Current Admission Diagnosis:Closed fracture of left hip (HCC)   Patient Active Problem List    Diagnosis Date Noted Date Diagnosed    Acute blood loss anemia 2025     History of hysterectomy 2025     Closed fracture of left hip (HCC) 2025     GERD (gastroesophageal reflux disease) 06/10/2024     Constipation 06/10/2024     Dementia ([facility])      CKD (chronic kidney disease) stage 3, GFR 30-59 ml/min ([facility])      Wound of right breast 2024     Status post fall 2019     Hyponatremia 2019     Benign essential hypertension 2019     Type 2 diabetes mellitus, with long-term current use of insulin ([facility]) 2019     Depression 2019     Hyperlipidemia 2019       LOS (days): 5  Geometric Mean LOS (GMLOS) (days): 4.4  Days to GMLOS:-0.3     OBJECTIVE:  Risk of Unplanned Readmission Score: 16.66         Current admission status: Inpatient   Preferred Pharmacy:   [facility] [address] [address]  Phone: [phone] Fax: [phone]    Primary Care Provider: No primary care provider on file.    Primary Insurance: MEDICARE  Secondary Insurance: [facility]    DISCHARGE DETAILS:    Other Referral/Resources/Interventions Provided:  Interventions: Transportation  Referral Comments: RN CM made aware patient cleared for discharge today. BLS requested for 1400 via [facility] and  via [facility] confirmed for 1630. All related parties made aware.     Treatment Team Recommendation: Short Term Rehab  Discharge Destination Plan:: Short Term Rehab  Transport at Discharge : BLS Ambulance     Number/Name of Dispatcher: [facility]  Transported by (Company and Unit #): [facility] [phone]  ETA of Transport (Date): 25  ETA of  Transport (Time): 5658

## 2025-03-03 NOTE — ASSESSMENT & PLAN NOTE
Patient presented from [facility] after a unwitnessed fall  CT head, CT cervical spine negative for acute pathology  X-ray hips shows left hip fracture but official read is pending  Received TXA in the ER after discussion with Ortho on-call  Keep n.p.o. after midnight for surgical intervention in a.m. per Ortho  Cardiology consulted for preop clearance  Repeat lab work in a.m.  PT/OT once cleared by Ortho  2/27 - Plan for OR this evening with orthopedic surgery. Pain currently well controlled. 2D echo ordered by cardiology. Follow up results.  2/28 - Patient is s/p short IMN left hip yesterday with orthopedic surgery.  Follow-up with further recommendations from orthopedic surgery.  Physical therapy and Occupational Therapy consultations have been placed.  3/1 - Pain well controlled. Hemoglobin same today (8.1). Case management working on rehab placement. Plan to discharge to rehab tomorrow.  3/2 - Hemoglobin level 7.3 this morning. Surgical dressing is dry. No evidence of acute bleeding. Repeat hemoglobin level this afternoon. Will administer IV iron.  3/3 - Hemoglobin 7.2 this morning. Surgical dressing remains dry. No evidence of bleeding. Discussed with orthopedic surgery and patient is cleared from their end for discharge with outpatient follow up.

## 2025-03-03 NOTE — ASSESSMENT & PLAN NOTE
Hemoglobin 7.2 as noted above. Surgical dressing remains dry. No evidence of acute bleeding. No evidence of bleeding. Discussed with orthopedic surgery and patient is cleared from their end for discharge with outpatient follow up. Will continue IV iron while inpatient and transition to PO iron on discharge.

## 2025-03-03 NOTE — ASSESSMENT & PLAN NOTE
Lab Results   Component Value Date    EGFR 56 03/03/2025    EGFR 48 03/02/2025    EGFR 44 03/01/2025    CREATININE 0.88 03/03/2025    CREATININE 1.00 03/02/2025    CREATININE 1.08 03/01/2025     Creatinine 0.88. Will continue to monitor.

## 2025-03-03 NOTE — DISCHARGE SUMMARY
Discharge Summary - Hospitalist   Name: Sabrina Barclay 89 y.o. female I MRN: [id number]  Unit/Bed#: 2 [address] Date of Admission: 2/26/2025   Date of Service: 3/3/2025 I Hospital Day: 5     Assessment & Plan  Closed fracture of left hip (HCC)  Patient presented from [facility] after a unwitnessed fall  CT head, CT cervical spine negative for acute pathology  X-ray hips shows left hip fracture but official read is pending  Received TXA in the ER after discussion with Ortho on-call  Keep n.p.o. after midnight for surgical intervention in a.m. per Ortho  Cardiology consulted for preop clearance  Repeat lab work in a.m.  PT/OT once cleared by Ortho  2/27 - Plan for OR this evening with orthopedic surgery. Pain currently well controlled. 2D echo ordered by cardiology. Follow up results.  2/28 - Patient is s/p short IMN left hip yesterday with orthopedic surgery.  Follow-up with further recommendations from orthopedic surgery.  Physical therapy and Occupational Therapy consultations have been placed.  3/1 - Pain well controlled. Hemoglobin same today (8.1). Case management working on rehab placement. Plan to discharge to rehab tomorrow.  3/2 - Hemoglobin level 7.3 this morning. Surgical dressing is dry. No evidence of acute bleeding. Repeat hemoglobin level this afternoon. Will administer IV iron.  3/3 - Hemoglobin 7.2 this morning. Surgical dressing remains dry. No evidence of bleeding. Discussed with orthopedic surgery and patient is cleared from their end for discharge with outpatient follow up.  Benign essential hypertension  Continue Cozaar and monitor blood pressures  Type 2 diabetes mellitus, with long-term current use of insulin ([facility])  Lab Results   Component Value Date    HGBA1C 7.7 (H) 02/26/2025       Recent Labs     03/02/25  2216 03/03/25  0725 03/03/25  1119 03/03/25  1551   POCGLU 167* 161* 189* 180*       Continue lantus 10 units. Hemoglobin A1c 7.7.  Continue insulin sliding scale.    - Resume home  medications on discharge    Dementia ([facility])  Continue Namenda Aricept  CKD (chronic kidney disease) stage 3, GFR 30-59 ml/min ([facility])  Lab Results   Component Value Date    EGFR 56 03/03/2025    EGFR 48 03/02/2025    EGFR 44 03/01/2025    CREATININE 0.88 03/03/2025    CREATININE 1.00 03/02/2025    CREATININE 1.08 03/01/2025     Creatinine 0.88. Will continue to monitor.  Depression  Continue Paxil, Remeron  History of hysterectomy    Acute blood loss anemia  Hemoglobin 7.2 as noted above. Surgical dressing remains dry. No evidence of acute bleeding. No evidence of bleeding. Discussed with orthopedic surgery and patient is cleared from their end for discharge with outpatient follow up. Will continue IV iron while inpatient and transition to PO iron on discharge.  Hyponatremia  Sodium improved to 134 today.     Medical Problems       Resolved Problems  Date Reviewed: 2/26/2025   None       Discharging Physician / Practitioner: Gildardo Gunderson MD  PCP: No primary care provider on file.  Admission Date:   Admission Orders (From admission, onward)       Ordered        02/26/25 1931  INPATIENT ADMISSION  Once                          Discharge Date: 03/03/25    Consultations During Hospital Stay:  Orthopedic surgery    Reason for Admission: 89 y.o. female with a PMH of dementia, CKD who presents status post fall at assisted living facility.  Fall was unwitnessed.  Patient is not the greatest historian given her dementia and states that she tried to get something from the closet and next thing she knows she landed on the floor.  States she was unable to get off the floor.  Patient denies any pain at rest but did report left hip pain to the nursing staff with movements.  States she normally uses a wheelchair for ambulation.     Hospital Course: Patient was seen and examined in the emergency department and was admitted to the hospital for further evaluation and management.  Patient presenting after a fall found to have a  "left-sided hip fracture.  Patient went to the operating room with orthopedic surgery and is s/p short IMN left hip.  Patient did have an element of acute blood loss anemia after the procedure related to the surgery.  Hemoglobin level has since stabilized.  Patient did receive a few doses of IV iron.  No evidence of bleeding.  No blood in the stool reported.  Patient was evaluated by physical therapy and Occupational Therapy with recommendations for inpatient rehab which has been set up at this time.  Patient will need follow-up with orthopedic surgery after discharge.  At the time of discharge patient does not appear to be in any acute distress or discomfort and currently denies any acute complaints or concerns including chest pain or shortness of breath.    Please see above list of diagnoses and related plan for additional information.     Condition at Discharge: stable    Discharge Day Visit / Exam:   Vitals: Blood Pressure: 132/62 (03/03/25 1553)  Pulse: 78 (03/03/25 1553)  Temperature: 97.6 °F (36.4 °C) (03/03/25 0748)  Temp Source: Temporal (03/01/25 2221)  Respirations: 17 (03/03/25 1553)  Height: 5' 4\" (162.6 cm) (02/27/25 1035)  Weight - Scale: 67.1 kg (148 lb) (02/27/25 1035)  SpO2: 94 % (03/03/25 1553)  Physical Exam  HENT:      Head: Normocephalic.      Mouth/Throat:      Mouth: Mucous membranes are moist.   Eyes:      Extraocular Movements: Extraocular movements intact.   Cardiovascular:      Rate and Rhythm: Normal rate.   Pulmonary:      Effort: Pulmonary effort is normal.   Abdominal:      Palpations: Abdomen is soft.      Tenderness: There is no abdominal tenderness.   Skin:     General: Skin is warm.   Neurological:      Mental Status: She is alert.   Psychiatric:         Mood and Affect: Mood normal.        Discussion with Family: Call placed to patient's son (Miguel Angel). No answer. Voicemail was left.    Discharge instructions/Information to patient and family:   See after visit summary for information " provided to patient and family.      Provisions for Follow-Up Care:  See after visit summary for information related to follow-up care and any pertinent home health orders.      Mobility at time of Discharge:   Basic Mobility Inpatient Raw Score: 9  -HLM Goal: 3: Sit at edge of bed  -HLM Achieved: 5: Stand (1 or more minutes)     Disposition:   Rehab.    Discharge Medications:  See after visit summary for reconciled discharge medications provided to patient and/or family.      Administrative Statements   Discharge Statement:  I have spent a total time of 35 minutes in caring for this patient on the day of the visit/encounter. >30 minutes of time was spent on: Diagnostic results, Prognosis, Risks and benefits of tx options, Instructions for management, Patient and family education, Importance of tx compliance, Risk factor reductions, Impressions, Counseling / Coordination of care, Documenting in the medical record, Reviewing / ordering tests, medicine, procedures and Communicating with other healthcare professionals.    **Please Note: This note may have been constructed using a voice recognition system**

## 2025-03-03 NOTE — PLAN OF CARE
Problem: PAIN - ADULT  Goal: Verbalizes/displays adequate comfort level or baseline comfort level  Description: Interventions:  - Encourage patient to monitor pain and request assistance  - Assess pain using appropriate pain scale  - Administer analgesics based on type and severity of pain and evaluate response  - Implement non-pharmacological measures as appropriate and evaluate response  - Consider cultural and social influences on pain and pain management  - Notify physician/advanced practitioner if interventions unsuccessful or patient reports new pain  3/3/2025 1608 by Ioana Guzman RN  Outcome: Completed  3/3/2025 0730 by Ioana Guzman RN  Outcome: Progressing     Problem: INFECTION - ADULT  Goal: Absence or prevention of progression during hospitalization  Description: INTERVENTIONS:  - Assess and monitor for signs and symptoms of infection  - Monitor lab/diagnostic results  - Monitor all insertion sites, i.e. indwelling lines, tubes, and drains  - Monitor endotracheal if appropriate and nasal secretions for changes in amount and color  - [place] appropriate cooling/warming therapies per order  - Administer medications as ordered  - Instruct and encourage patient and family to use good hand hygiene technique  - Identify and instruct in appropriate isolation precautions for identified infection/condition  3/3/2025 1608 by Ioana Guzman RN  Outcome: Completed  3/3/2025 0730 by Ioana Guzman RN  Outcome: Progressing  Goal: Absence of fever/infection during neutropenic period  Description: INTERVENTIONS:  - Monitor WBC    3/3/2025 1608 by Ioana Guzman RN  Outcome: Completed  3/3/2025 0730 by Ioana Guzman RN  Outcome: Progressing     Problem: SAFETY ADULT  Goal: Patient will remain free of falls  Description: INTERVENTIONS:  - Educate patient/family on patient safety including physical limitations  - Instruct patient to call for assistance with activity   - Consult OT/PT to  assist with strengthening/mobility   - Keep Call bell within reach  - Keep bed low and locked with side rails adjusted as appropriate  - Keep care items and personal belongings within reach  - Initiate and maintain comfort rounds  - Make Fall Risk Sign visible to staff  - Offer Toileting every 2 Hours, in advance of need  - Initiate/Maintain bed/chair alarm  - Obtain necessary fall risk management equipment: bed/chair alarm  - Apply yellow socks and bracelet for high fall risk patients  - Consider moving patient to room near nurses station  3/3/2025 1608 by Ioana Guzman RN  Outcome: Completed  3/3/2025 0730 by Ioana Guzman RN  Outcome: Progressing  Goal: Maintain or return to baseline ADL function  Description: INTERVENTIONS:  -  Assess patient's ability to carry out ADLs; assess patient's baseline for ADL function and identify physical deficits which impact ability to perform ADLs (bathing, care of mouth/teeth, toileting, grooming, dressing, etc.)  - Assess/evaluate cause of self-care deficits   - Assess range of motion  - Assess patient's mobility; develop plan if impaired  - Assess patient's need for assistive devices and provide as appropriate  - Encourage maximum independence but intervene and supervise when necessary  - Involve family in performance of ADLs  - Assess for home care needs following discharge   - Consider OT consult to assist with ADL evaluation and planning for discharge  - Provide patient education as appropriate  3/3/2025 1608 by Ioana Guzman RN  Outcome: Completed  3/3/2025 0730 by Ioana Guzman RN  Outcome: Progressing  Goal: Maintains/Returns to pre admission functional level  Description: INTERVENTIONS:  - Perform AM-PAC 6 Click Basic Mobility/ Daily Activity assessment daily.  - Set and communicate daily mobility goal to care team and patient/family/caregiver.   - Collaborate with rehabilitation services on mobility goals if consulted  - Perform Range of Motion 3  times a day.  - Reposition patient every 3 hours.  - Dangle patient 3 times a day  - Stand patient 3 times a day  - Ambulate patient 3 times a day  - Out of bed to chair 3 times a day   - Out of bed for meals 3 times a day  - Out of bed for toileting  - Record patient progress and toleration of activity level   3/3/2025 1608 by Ioana Guzman RN  Outcome: Completed  3/3/2025 0730 by Ioana Guzman RN  Outcome: Progressing     Problem: DISCHARGE PLANNING  Goal: Discharge to home or other facility with appropriate resources  Description: INTERVENTIONS:  - Identify barriers to discharge w/patient and caregiver  - Arrange for needed discharge resources and transportation as appropriate  - Identify discharge learning needs (meds, wound care, etc.)  - Arrange for interpretive services to assist at discharge as needed  - Refer to Case Management Department for coordinating discharge planning if the patient needs post-hospital services based on physician/advanced practitioner order or complex needs related to functional status, cognitive ability, or social support system  3/3/2025 1608 by Ioana Guzman RN  Outcome: Completed  3/3/2025 0730 by Ioana Guzman RN  Outcome: Progressing     Problem: Knowledge Deficit  Goal: Patient/family/caregiver demonstrates understanding of disease process, treatment plan, medications, and discharge instructions  Description: Complete learning assessment and assess knowledge base.  Interventions:  - Provide teaching at level of understanding  - Provide teaching via preferred learning methods  3/3/2025 1608 by Ioana Guzman RN  Outcome: Completed  3/3/2025 0730 by Ioana Guzman RN  Outcome: Progressing     Problem: Nutrition/Hydration-ADULT  Goal: Nutrient/Hydration intake appropriate for improving, restoring or maintaining nutritional needs  Description: Monitor and assess patient's nutrition/hydration status for malnutrition. Collaborate with interdisciplinary  team and initiate plan and interventions as ordered.  Monitor patient's weight and dietary intake as ordered or per policy. Utilize nutrition screening tool and intervene as necessary. Determine patient's food preferences and provide high-protein, high-caloric foods as appropriate.     INTERVENTIONS:  - Monitor oral intake, urinary output, labs, and treatment plans  - Assess nutrition and hydration status and recommend course of action  - Evaluate amount of meals eaten  - Assist patient with eating if necessary   - Allow adequate time for meals  - Recommend/ encourage appropriate diets, oral nutritional supplements, and vitamin/mineral supplements  - Order, calculate, and assess calorie counts as needed  - Recommend, monitor, and adjust tube feedings and TPN/PPN based on assessed needs  - Assess need for intravenous fluids  - Provide specific nutrition/hydration education as appropriate  - Include patient/family/caregiver in decisions related to nutrition  3/3/2025 1608 by Ioana Guzman RN  Outcome: Completed  3/3/2025 0730 by Ioana Guzman RN  Outcome: Progressing     Problem: Prexisting or High Potential for Compromised Skin Integrity  Goal: Skin integrity is maintained or improved  Description: INTERVENTIONS:  - Identify patients at risk for skin breakdown  - Assess and monitor skin integrity  - Assess and monitor nutrition and hydration status  - Monitor labs   - Assess for incontinence   - Turn and reposition patient  - Assist with mobility/ambulation  - Relieve pressure over bony prominences  - Avoid friction and shearing  - Provide appropriate hygiene as needed including keeping skin clean and dry  - Evaluate need for skin moisturizer/barrier cream  - Collaborate with interdisciplinary team   - Patient/family teaching  - Consider wound care consult   3/3/2025 1608 by Ioana Guzman RN  Outcome: Completed  3/3/2025 0730 by Ioana Guzman RN  Outcome: Progressing     Problem: Potential for  Falls  Goal: Patient will remain free of falls  Description: INTERVENTIONS:  - Educate patient/family on patient safety including physical limitations  - Instruct patient to call for assistance with activity   - Consult OT/PT to assist with strengthening/mobility   - Keep Call bell within reach  - Keep bed low and locked with side rails adjusted as appropriate  - Keep care items and personal belongings within reach  - Initiate and maintain comfort rounds  - Make Fall Risk Sign visible to staff  - Offer Toileting every 2 Hours, in advance of need  - Initiate/Maintain bed/chair alarm  - Obtain necessary fall risk management equipment: bed/chair alarm  - Apply yellow socks and bracelet for high fall risk patients  - Consider moving patient to room near nurses station  3/3/2025 1608 by Ioana Guzman RN  Outcome: Completed  3/3/2025 0730 by Ioana Guzman, RN  Outcome: Progressing

## 2025-03-03 NOTE — PHYSICAL THERAPY NOTE
"   PT TREATMENT     03/03/25 1055   PT Last Visit   PT Visit Date 03/03/25   Note Type   Note Type Treatment   Pain Assessment   Pain Assessment Tool 0-10   Pain Score No Pain   Restrictions/Precautions   Weight Bearing Precautions Per Order Yes   LLE Weight Bearing Per Order WBAT   Other Precautions Pain;Fall Risk;Chair Alarm;Bed Alarm;Cognitive   General   Chart Reviewed Yes   Additional Pertinent History hgb 7.1 today, pt getting iron.  Pt OOB in chair upon arrival to pt's room.   Cognition   Overall Cognitive Status Impaired   Arousal/Participation Cooperative   Following Commands Follows one step commands without difficulty   Subjective   Subjective \"I broke my hip?\"   Transfers   Sit to Stand 2  Maximal assistance   Additional items Verbal cues;Increased time required  (tactile cues, assist to place feet, UE support on the walker)   Stand to Sit 2  Maximal assistance   Balance   Static Sitting Fair   Static Standing Poor   Activity Tolerance   Activity Tolerance Patient limited by fatigue   Exercises    x 10 each hip flexion seated (AAROM L), LAQ, ankle pumps, hip IR/ER, hip abd/add in sitting.    sit to stand from chair x 3 trials with max assist and UEsupport on a walker.  Pt stood x 20 seconds each trial.   Assessment   Prognosis Good   Problem List Decreased strength;Impaired balance;Decreased mobility;Pain;Decreased cognition   Assessment Pt is cooperative but remains very weak POD 4 s/p L hip fx with IMN.  Pt was able to stand x 3 trials with max assist but unable to take any steps due to weakness.  Plan to continue working on transfers/leg strength with progression to walking as able.    The patient's AM-PAC Basic Mobility Inpatient Short Form Raw Score is 9. A Raw score of less than or equal to 16 suggests the patient may benefit from discharge to post-acute rehabilitation services. Please also refer to the recommendation of the Physical Therapist for safe discharge planning.         Plan "   Treatment/Interventions LE strengthening/ROM;Functional transfer training;ADL retraining;Therapeutic exercise;Bed mobility;Gait training;Equipment eval/education;Cognitive reorientation   Progress Progressing toward goals   PT Frequency   (6x/w)   Discharge Recommendation   Rehab Resource Intensity Level, PT II (Moderate Resource Intensity)   AM-PAC Basic Mobility Inpatient   Turning in Flat Bed Without Bedrails 2   Lying on Back to Sitting on Edge of Flat Bed Without Bedrails 2   Moving Bed to Chair 1   Standing Up From Chair Using Arms 2   Walk in Room 1   Climb 3-5 Stairs With Railing 1   Basic Mobility Inpatient Raw Score 9   Turning Head Towards Sound 3   Follow Simple Instructions 3   Low Function Basic Mobility Raw Score  15   Low Function Basic Mobility Standardized Score  23.9   [facility] Highest Level Of Mobility   -HL Goal 3: Sit at edge of bed   -HLM Achieved 5: Stand (1 or more minutes)   End of Consult   Patient Position at End of Consult All needs within reach;Bed/Chair alarm activated;Bedside chair   Licensure   [place] License Number  Judi Santos PT [id number]

## 2025-03-03 NOTE — PLAN OF CARE
Problem: PAIN - ADULT  Goal: Verbalizes/displays adequate comfort level or baseline comfort level  Description: Interventions:  - Encourage patient to monitor pain and request assistance  - Assess pain using appropriate pain scale  - Administer analgesics based on type and severity of pain and evaluate response  - Implement non-pharmacological measures as appropriate and evaluate response  - Consider cultural and social influences on pain and pain management  - Notify physician/advanced practitioner if interventions unsuccessful or patient reports new pain  Outcome: Progressing     Problem: INFECTION - ADULT  Goal: Absence or prevention of progression during hospitalization  Description: INTERVENTIONS:  - Assess and monitor for signs and symptoms of infection  - Monitor lab/diagnostic results  - Monitor all insertion sites, i.e. indwelling lines, tubes, and drains  - Monitor endotracheal if appropriate and nasal secretions for changes in amount and color  - [place] appropriate cooling/warming therapies per order  - Administer medications as ordered  - Instruct and encourage patient and family to use good hand hygiene technique  - Identify and instruct in appropriate isolation precautions for identified infection/condition  Outcome: Progressing  Goal: Absence of fever/infection during neutropenic period  Description: INTERVENTIONS:  - Monitor WBC    Outcome: Progressing     Problem: SAFETY ADULT  Goal: Patient will remain free of falls  Description: INTERVENTIONS:  - Educate patient/family on patient safety including physical limitations  - Instruct patient to call for assistance with activity   - Consult OT/PT to assist with strengthening/mobility   - Keep Call bell within reach  - Keep bed low and locked with side rails adjusted as appropriate  - Keep care items and personal belongings within reach  - Initiate and maintain comfort rounds  - Make Fall Risk Sign visible to staff  - Offer Toileting every 2 Hours,  in advance of need  - Initiate/Maintain bed alarm  - Obtain necessary fall risk management equipment: walker  - Apply yellow socks and bracelet for high fall risk patients  - Consider moving patient to room near nurses station  Outcome: Progressing  Goal: Maintain or return to baseline ADL function  Description: INTERVENTIONS:  -  Assess patient's ability to carry out ADLs; assess patient's baseline for ADL function and identify physical deficits which impact ability to perform ADLs (bathing, care of mouth/teeth, toileting, grooming, dressing, etc.)  - Assess/evaluate cause of self-care deficits   - Assess range of motion  - Assess patient's mobility; develop plan if impaired  - Assess patient's need for assistive devices and provide as appropriate  - Encourage maximum independence but intervene and supervise when necessary  - Involve family in performance of ADLs  - Assess for home care needs following discharge   - Consider OT consult to assist with ADL evaluation and planning for discharge  - Provide patient education as appropriate  Outcome: Progressing  Goal: Maintains/Returns to pre admission functional level  Description: INTERVENTIONS:  - Perform AM-PAC 6 Click Basic Mobility/ Daily Activity assessment daily.  - Set and communicate daily mobility goal to care team and patient/family/caregiver.   - Collaborate with rehabilitation services on mobility goals if consulted  - Perform Range of Motion 3 times a day.  - Reposition patient every 2 hours.  - Dangle patient 3 times a day  - Stand patient 3 times a day  - Ambulate patient 3 times a day  - Out of bed to chair 3 times a day   - Out of bed for meals 3 times a day  - Out of bed for toileting  - Record patient progress and toleration of activity level   Outcome: Progressing     Problem: DISCHARGE PLANNING  Goal: Discharge to home or other facility with appropriate resources  Description: INTERVENTIONS:  - Identify barriers to discharge w/patient and  caregiver  - Arrange for needed discharge resources and transportation as appropriate  - Identify discharge learning needs (meds, wound care, etc.)  - Arrange for interpretive services to assist at discharge as needed  - Refer to Case Management Department for coordinating discharge planning if the patient needs post-hospital services based on physician/advanced practitioner order or complex needs related to functional status, cognitive ability, or social support system  Outcome: Progressing     Problem: Knowledge Deficit  Goal: Patient/family/caregiver demonstrates understanding of disease process, treatment plan, medications, and discharge instructions  Description: Complete learning assessment and assess knowledge base.  Interventions:  - Provide teaching at level of understanding  - Provide teaching via preferred learning methods  Outcome: Progressing     Problem: Nutrition/Hydration-ADULT  Goal: Nutrient/Hydration intake appropriate for improving, restoring or maintaining nutritional needs  Description: Monitor and assess patient's nutrition/hydration status for malnutrition. Collaborate with interdisciplinary team and initiate plan and interventions as ordered.  Monitor patient's weight and dietary intake as ordered or per policy. Utilize nutrition screening tool and intervene as necessary. Determine patient's food preferences and provide high-protein, high-caloric foods as appropriate.     INTERVENTIONS:  - Monitor oral intake, urinary output, labs, and treatment plans  - Assess nutrition and hydration status and recommend course of action  - Evaluate amount of meals eaten  - Assist patient with eating if necessary   - Allow adequate time for meals  - Recommend/ encourage appropriate diets, oral nutritional supplements, and vitamin/mineral supplements  - Order, calculate, and assess calorie counts as needed  - Recommend, monitor, and adjust tube feedings and TPN/PPN based on assessed needs  - Assess need for  intravenous fluids  - Provide specific nutrition/hydration education as appropriate  - Include patient/family/caregiver in decisions related to nutrition  Outcome: Progressing     Problem: Prexisting or High Potential for Compromised Skin Integrity  Goal: Skin integrity is maintained or improved  Description: INTERVENTIONS:  - Identify patients at risk for skin breakdown  - Assess and monitor skin integrity  - Assess and monitor nutrition and hydration status  - Monitor labs   - Assess for incontinence   - Turn and reposition patient  - Assist with mobility/ambulation  - Relieve pressure over bony prominences  - Avoid friction and shearing  - Provide appropriate hygiene as needed including keeping skin clean and dry  - Evaluate need for skin moisturizer/barrier cream  - Collaborate with interdisciplinary team   - Patient/family teaching  - Consider wound care consult   Outcome: Progressing     Problem: Potential for Falls  Goal: Patient will remain free of falls  Description: INTERVENTIONS:  - Educate patient/family on patient safety including physical limitations  - Instruct patient to call for assistance with activity   - Consult OT/PT to assist with strengthening/mobility   - Keep Call bell within reach  - Keep bed low and locked with side rails adjusted as appropriate  - Keep care items and personal belongings within reach  - Initiate and maintain comfort rounds  - Make Fall Risk Sign visible to staff  - Offer Toileting every 2 Hours, in advance of need  - Initiate/Maintain bed alarm  - Obtain necessary fall risk management equipment: walker  - Apply yellow socks and bracelet for high fall risk patients  - Consider moving patient to room near nurses station  Outcome: Progressing

## 2025-03-03 NOTE — [place] UNIVERSAL TRANSFER FORM
"[place] UNIVERSAL TRANSFER FORM  (ALL ITEMS MUST BE COMPLETED)    1. TRANSFER FROM: [facility]      TRANSFER TO: [facility]    2. DATE OF TRANSFER: 3/3/2025                        TIME OF TRANSFER: 1630    3. PATIENT NAME: Sabrina Barclay,        YOB: 1930                             GENDER: female    4. LANGUAGE:   English    5. PHYSICIAN NAME:  Gildardo Gunderson MD                   PHONE: [phone]    6. CODE STATUS: Level 3 - DNAR and DNI        Out of Hospital DNR Attached: No    7. :                                      :  Extended Emergency Contact Information  Primary Emergency Contact: Miguel Angel Barclay  Address: [address] [place] [address]  Home Phone: [phone]  Mobile Phone: [phone]  Relation: Son           Health Care Representative/Proxy:  No           Legal Guardian:  No             NAME OF:           HEALTH CARE REPRESENTATIVE/PROXY:                                         OR           LEGAL GUARDIAN, IF NOT :                                               PHONE:  (Day)           (Night)                        (Cell)    8. REASON FOR TRANSFER: (Must include brief medical history and recent changes in physical function or cognition.) Needs physical therapy and strengthening            V/S: /62   Pulse 78   Temp 97.6 °F (36.4 °C)   Resp 17   Ht 5' 4\" (1.626 m)   Wt 67.1 kg (148 lb)   SpO2 94%   BMI 25.40 kg/m²           PAIN: None    9. PRIMARY DIAGNOSIS: Closed fracture of left hip (HCC)      Secondary Diagnosis:         Pacemaker: No      Internal Defib: No          Mental Health Diagnosis (if Applicable):    10. RESTRAINTS: No     11. RESPIRATORY NEEDS: None    12. ISOLATION/PRECAUTION: None    13. ALLERGY: Patient has no known allergies.    14. SENSORY:       Vision Good, Hearing Good , and Speech Clear    15. SKIN CONDITION: No Wounds    16. " DIET: Regular    17. IV ACCESS: None    18. PERSONAL ITEMS SENT WITH PATIENT: None    19. ATTACHED DOCUMENTS: MUST ATTACH CURRENT MEDICATION INFORMATION Face Sheet, MAR, and Medication Reconciliation    20. AT RISK ALERTS:Falls and Pressure Ulcer        HARM TO: N/A    21. WEIGHT BEARING STATUS:         Left Leg: Limited        Right Leg: Limited    22. MENTAL STATUS:Alert, Oriented, and Forgetful    23. FUNCTION:        Walk: With Help        Transfer: With Help        Toilet: With Help        Feed: Self    24. IMMUNIZATIONS/SCREENING:     There is no immunization history on file for this patient.    25. BOWEL: Incontinent     26. BLADDER: Incontinent    27. SENDING FACILITY CONTACT: Ioana Guzman                  Title: RN        Unit: [address] [place] [address]        Phone: [phone]          REC'G FACILITY CONTACT (if known):        Title:        Unit:         Phone:         FORM PREFILLED BY (if applicable)       Title:       Unit:        Phone:         FORM COMPLETED BY Ioana Guzman RN      Title: [facility]      Phone: [phone]

## 2025-03-18 ENCOUNTER — TELEPHONE (OUTPATIENT)
Dept: OBGYN CLINIC | Facility: HOSPITAL | Age: OVER 89
End: 2025-03-18

## 2025-03-18 ENCOUNTER — OFFICE VISIT (OUTPATIENT)
Dept: OBGYN CLINIC | Facility: CLINIC | Age: OVER 89
End: 2025-03-18

## 2025-03-18 ENCOUNTER — APPOINTMENT (OUTPATIENT)
Dept: RADIOLOGY | Facility: CLINIC | Age: OVER 89
End: 2025-03-18
Payer: MEDICARE

## 2025-03-18 VITALS — HEIGHT: 64 IN | BODY MASS INDEX: 25.27 KG/M2 | WEIGHT: 148 LBS

## 2025-03-18 DIAGNOSIS — Z09 S/P ORTHOPEDIC SURGERY, FOLLOW-UP EXAM: ICD-10-CM

## 2025-03-18 DIAGNOSIS — S72.142D CLOSED DISPLACED INTERTROCHANTERIC FRACTURE OF LEFT FEMUR WITH ROUTINE HEALING, SUBSEQUENT ENCOUNTER: Primary | ICD-10-CM

## 2025-03-18 PROCEDURE — 99024 POSTOP FOLLOW-UP VISIT: CPT | Performed by: ORTHOPAEDIC SURGERY

## 2025-03-18 PROCEDURE — 73552 X-RAY EXAM OF FEMUR 2/>: CPT

## 2025-03-18 RX ORDER — POLYETHYLENE GLYCOL 3350 17 G/17G
17 POWDER, FOR SOLUTION ORAL DAILY
COMMUNITY

## 2025-03-18 RX ORDER — TRAMADOL HYDROCHLORIDE 50 MG/1
25 TABLET ORAL EVERY 8 HOURS PRN
COMMUNITY
Start: 2024-12-26

## 2025-03-18 RX ORDER — PAROXETINE 40 MG/1
40 TABLET, FILM COATED ORAL DAILY
COMMUNITY
Start: 2025-02-24

## 2025-03-18 NOTE — TELEPHONE ENCOUNTER
Caller: [facility]    Doctor: Dr. Vaibhav Rosario    Reason for call: Patient came back with steri Strips which is fine, but proximal area to the Hip has 13 staples still there. Wondering what the recommendations are for this? They have instructions for the steri-strips and not submerging in water but not about these other staples that are in?    Call back#: [phone]

## 2025-03-18 NOTE — PROGRESS NOTES
Patient Name: Sabrina Barclay      : 3/25/1930       MRN: [id number]   Encounter Provider: Vaibhav Rosario MD   Encounter Date: 25  Encounter department: [facility] [place]         Assessment & Plan  Closed displaced intertrochanteric fracture of left femur with routine healing, subsequent encounter  Sabrina is now nearly 3 weeks s/p left femur IM nail insertion. We reviewed the procedure in detail. We also reviewed updated left hip x-rays today, which demonstrate appropriate positioning of hardware without evidence of complications. I believe the patient is progressing appropriately and I am pleased with her clinical presentation today. She will remain weight bearing as tolerated with use of assistive devices at all times. Steri strips can be removed in 5 days. Avoid submersion of the incisions for at least 4 weeks after surgery. Use OTC medications and ice/heat as needed for pain control. Continue ASA 81 mg BID for 30 days after surgery. Follow up in 4 weeks with repeat left femur x-rays upon arrival.             _____________________________________________________  CHIEF COMPLAINT:  Chief Complaint   Patient presents with   • Left Leg - Post-op         SUBJECTIVE:  Patient is a 89 y.o. year old female who presents for follow up now 2.5 weeks s/p Insertion Nail Im Femur Antegrade (trochanteric) - Left performed on 2025.  Sabrina presents from rehab in a wheelchair. Today, the patient states she has no left hip pain. She reports working with PT/OT daily without significant issues. She denies new injury/trauma, calf pain/swelling, and numbness/tingling. The patient has been taking Aspirin 81 mg twice daily for blood clot prevention.     _____________________________________________________  PHYSICAL EXAM:  General/Constitutional: NAD, well developed, well nourished  HENT: Normocephalic, atraumatic  CV: Intact distal pulses, regular rate  Resp: No respiratory distress or  labored breathing  Abdomen: soft, nondistended   Lymphatic: No lymphadenopathy palpated  Neuro: Alert and Oriented x 3, no focal deficits  Psych: Normal mood, normal affect  Skin: Warm, dry, no rashes, no erythema    MUSCULOSKELETAL EXAMINATION:     Left Lower Extremity   Incision: Clean, dry, and intact. Staples removed and Steri strips applied  Able to move toes/ankle freely  No calf pain or swelling  Able to stand with assistance  +EHL/FHL/TA/GS  SILT throughout  Palpable DP pulse     X-rays of the left femur reviewed and interpreted today, which demonstrate appropriate positioning of hardware without evidence of complications. Moderate hip arthritis.    Scribe Attestation    I,:  Viki Carreon PA-C am acting as a scribe while in the presence of the attending physician.:       I,:  Vaibhav Rosario MD personally performed the services described in this documentation    as scribed in my presence.:

## 2025-03-29 NOTE — PLAN OF CARE
Problem: PAIN - ADULT  Goal: Verbalizes/displays adequate comfort level or baseline comfort level  Description: Interventions:  - Encourage patient to monitor pain and request assistance  - Assess pain using appropriate pain scale  - Administer analgesics based on type and severity of pain and evaluate response  - Implement non-pharmacological measures as appropriate and evaluate response  - Consider cultural and social influences on pain and pain management  - Notify physician/advanced practitioner if interventions unsuccessful or patient reports new pain  Outcome: Progressing     Problem: INFECTION - ADULT  Goal: Absence or prevention of progression during hospitalization  Description: INTERVENTIONS:  - Assess and monitor for signs and symptoms of infection  - Monitor lab/diagnostic results  - Monitor all insertion sites, i.e. indwelling lines, tubes, and drains  - Monitor endotracheal if appropriate and nasal secretions for changes in amount and color  - Lyman appropriate cooling/warming therapies per order  - Administer medications as ordered  - Instruct and encourage patient and family to use good hand hygiene technique  - Identify and instruct in appropriate isolation precautions for identified infection/condition  Outcome: Progressing  Goal: Absence of fever/infection during neutropenic period  Description: INTERVENTIONS:  - Monitor WBC    Outcome: Progressing     Problem: SAFETY ADULT  Goal: Patient will remain free of falls  Description: INTERVENTIONS:  - Educate patient/family on patient safety including physical limitations  - Instruct patient to call for assistance with activity   - Consult OT/PT to assist with strengthening/mobility   - Keep Call bell within reach  - Keep bed low and locked with side rails adjusted as appropriate  - Keep care items and personal belongings within reach  - Initiate and maintain comfort rounds  - Make Fall Risk Sign visible to staff  - Offer Toileting every 2 Hours,  in advance of need  - Initiate/Maintain bed alarm  - Obtain necessary fall risk management equipment: yellow socks   - Apply yellow socks and bracelet for high fall risk patients  - Consider moving patient to room near nurses station  Outcome: Progressing  Goal: Maintain or return to baseline ADL function  Description: INTERVENTIONS:  -  Assess patient's ability to carry out ADLs; assess patient's baseline for ADL function and identify physical deficits which impact ability to perform ADLs (bathing, care of mouth/teeth, toileting, grooming, dressing, etc.)  - Assess/evaluate cause of self-care deficits   - Assess range of motion  - Assess patient's mobility; develop plan if impaired  - Assess patient's need for assistive devices and provide as appropriate  - Encourage maximum independence but intervene and supervise when necessary  - Involve family in performance of ADLs  - Assess for home care needs following discharge   - Consider OT consult to assist with ADL evaluation and planning for discharge  - Provide patient education as appropriate  Outcome: Progressing  Goal: Maintains/Returns to pre admission functional level  Description: INTERVENTIONS:  - Perform AM-PAC 6 Click Basic Mobility/ Daily Activity assessment daily.  - Set and communicate daily mobility goal to care team and patient/family/caregiver.   - Collaborate with rehabilitation services on mobility goals if consulted  - Perform Range of Motion 3 times a day.  - Reposition patient every 2 hours.  - Dangle patient 3 times a day  - Stand patient 2 times a day  - Ambulate patient 3 times a day  - Out of bed to chair 3 times a day   - Out of bed for meals 3 times a day  - Out of bed for toileting  - Record patient progress and toleration of activity level   Outcome: Progressing     Problem: DISCHARGE PLANNING  Goal: Discharge to home or other facility with appropriate resources  Description: INTERVENTIONS:  - Identify barriers to discharge w/patient and  caregiver  - Arrange for needed discharge resources and transportation as appropriate  - Identify discharge learning needs (meds, wound care, etc.)  - Arrange for interpretive services to assist at discharge as needed  - Refer to Case Management Department for coordinating discharge planning if the patient needs post-hospital services based on physician/advanced practitioner order or complex needs related to functional status, cognitive ability, or social support system  Outcome: Progressing     Problem: Knowledge Deficit  Goal: Patient/family/caregiver demonstrates understanding of disease process, treatment plan, medications, and discharge instructions  Description: Complete learning assessment and assess knowledge base.  Interventions:  - Provide teaching at level of understanding  - Provide teaching via preferred learning methods  Outcome: Progressing     Problem: Nutrition/Hydration-ADULT  Goal: Nutrient/Hydration intake appropriate for improving, restoring or maintaining nutritional needs  Description: Monitor and assess patient's nutrition/hydration status for malnutrition. Collaborate with interdisciplinary team and initiate plan and interventions as ordered.  Monitor patient's weight and dietary intake as ordered or per policy. Utilize nutrition screening tool and intervene as necessary. Determine patient's food preferences and provide high-protein, high-caloric foods as appropriate.     INTERVENTIONS:  - Monitor oral intake, urinary output, labs, and treatment plans  - Assess nutrition and hydration status and recommend course of action  - Evaluate amount of meals eaten  - Assist patient with eating if necessary   - Allow adequate time for meals  - Recommend/ encourage appropriate diets, oral nutritional supplements, and vitamin/mineral supplements  - Order, calculate, and assess calorie counts as needed  - Recommend, monitor, and adjust tube feedings and TPN/PPN based on assessed needs  - Assess need for  intravenous fluids  - Provide specific nutrition/hydration education as appropriate  - Include patient/family/caregiver in decisions related to nutrition  Outcome: Progressing      no

## (undated) DEVICE — DRESSING MEPILEX AG BORDER POST-OP 4 X 6 IN

## (undated) DEVICE — CHLORAPREP HI-LITE 26ML ORANGE

## (undated) DEVICE — SUT VICRYL 0 CP-1 27 IN J267H

## (undated) DEVICE — LAPAROTOMY SPONGE - RF AND X-RAY DETECTABLE PRE-WASHED: Brand: SITUATE

## (undated) DEVICE — 3M™ STERI-DRAPE™  ISOLATION DRAPE WITH INCISE FILM AND POUCH 1017: Brand: STERI-DRAPE™

## (undated) DEVICE — DRAPE C-ARMOUR

## (undated) DEVICE — STERILE DOUBLE BASIN SET PACK: Brand: CARDINAL HEALTH

## (undated) DEVICE — PAD CAST 4 IN COTTON NON STERILE

## (undated) DEVICE — FABRIC REINFORCED, SURGICAL GOWN, XL: Brand: CONVERTORS

## (undated) DEVICE — 3M™ COBAN™ NL STERILE NON-LATEX SELF-ADHERENT WRAP, 2084S, 4 IN X 5 YD (10 CM X 4,5 M), 18 ROLLS/CASE: Brand: 3M™ COBAN™

## (undated) DEVICE — DRAPE SHEET THREE QUARTER

## (undated) DEVICE — DRESSING MEPILEX AG BORDER 4 X 4 IN

## (undated) DEVICE — MAT ABSORBANT ARTHROSCOPY FLOOR 46 X 40 IN

## (undated) DEVICE — ASTOUND SURGICAL GOWN, XXX LARGE, X-LONG: Brand: CONVERTORS

## (undated) DEVICE — 4.2MM THREE-FLUTED DRILL BIT QC/330MM/100MM CALIBRATION

## (undated) DEVICE — 3M™ STERI-DRAPE™ U-DRAPE 1015: Brand: STERI-DRAPE™

## (undated) DEVICE — GLOVE SRG BIOGEL ORTHOPEDIC 8

## (undated) DEVICE — SUT VICRYL 2-0 CT-1 27 IN J259H

## (undated) DEVICE — NEPTUNE E-SEP SMOKE EVACUATION PENCIL, COATED, 70MM BLADE, PUSH BUTTON SWITCH: Brand: NEPTUNE E-SEP

## (undated) DEVICE — 3M™ DURAPORE™ SURGICAL TAPE 1538-3, 3 INCH X 10 YARD (7,5CM X 9,1M), 4 ROLLS/BOX: Brand: 3M™ DURAPORE™

## (undated) DEVICE — GLOVE INDICATOR PI UNDERGLOVE SZ 8 BLUE

## (undated) DEVICE — TOWEL SET X-RAY

## (undated) DEVICE — 3.2MM GUIDE WIRE 400MM

## (undated) DEVICE — PISTOL GRIP SKIN STAPLER RELOAD: Brand: MULTIFIRE PREMIUM

## (undated) DEVICE — C-ARM: Brand: UNBRANDED

## (undated) DEVICE — PACK GENERAL LF

## (undated) DEVICE — 2.5MM REAMING ROD WITH BALL TIP/950MM-STERILE
Type: IMPLANTABLE DEVICE | Site: HIP | Status: NON-FUNCTIONAL
Removed: 2025-02-27